# Patient Record
Sex: FEMALE | Race: WHITE | NOT HISPANIC OR LATINO | Employment: OTHER | URBAN - METROPOLITAN AREA
[De-identification: names, ages, dates, MRNs, and addresses within clinical notes are randomized per-mention and may not be internally consistent; named-entity substitution may affect disease eponyms.]

---

## 2019-04-17 ENCOUNTER — OFFICE VISIT (OUTPATIENT)
Dept: URGENT CARE | Facility: CLINIC | Age: 59
End: 2019-04-17
Payer: COMMERCIAL

## 2019-04-17 VITALS
TEMPERATURE: 98.5 F | HEIGHT: 64 IN | DIASTOLIC BLOOD PRESSURE: 71 MMHG | WEIGHT: 168 LBS | OXYGEN SATURATION: 96 % | RESPIRATION RATE: 16 BRPM | SYSTOLIC BLOOD PRESSURE: 128 MMHG | HEART RATE: 106 BPM | BODY MASS INDEX: 28.68 KG/M2

## 2019-04-17 DIAGNOSIS — T17.208A FOREIGN BODY IN PHARYNX, INITIAL ENCOUNTER: Primary | ICD-10-CM

## 2019-04-17 PROCEDURE — 99203 OFFICE O/P NEW LOW 30 MIN: CPT | Performed by: PHYSICIAN ASSISTANT

## 2019-04-17 RX ORDER — PAROXETINE HYDROCHLORIDE 40 MG/1
40 TABLET, FILM COATED ORAL EVERY MORNING
COMMUNITY

## 2019-04-17 RX ORDER — LISINOPRIL AND HYDROCHLOROTHIAZIDE 25; 20 MG/1; MG/1
1 TABLET ORAL DAILY
COMMUNITY

## 2019-04-17 RX ORDER — ALPRAZOLAM 1 MG/1
TABLET ORAL
COMMUNITY
End: 2020-09-30 | Stop reason: ALTCHOICE

## 2020-03-31 ENCOUNTER — HOSPITAL ENCOUNTER (INPATIENT)
Facility: HOSPITAL | Age: 60
LOS: 5 days | Discharge: HOME | DRG: 179 | End: 2020-04-06
Attending: INTERNAL MEDICINE | Admitting: FAMILY MEDICINE
Payer: COMMERCIAL

## 2020-03-31 ENCOUNTER — APPOINTMENT (EMERGENCY)
Dept: RADIOLOGY | Facility: HOSPITAL | Age: 60
DRG: 179 | End: 2020-03-31
Payer: COMMERCIAL

## 2020-03-31 DIAGNOSIS — J96.01 ACUTE RESPIRATORY FAILURE WITH HYPOXIA (HCC): ICD-10-CM

## 2020-03-31 DIAGNOSIS — J18.9 PNEUMONIA OF BOTH LOWER LOBES DUE TO INFECTIOUS ORGANISM: Primary | ICD-10-CM

## 2020-03-31 DIAGNOSIS — U07.1 COVID-19 VIRUS INFECTION: ICD-10-CM

## 2020-03-31 DIAGNOSIS — R06.02 SOB (SHORTNESS OF BREATH): ICD-10-CM

## 2020-03-31 PROBLEM — E78.49 OTHER HYPERLIPIDEMIA: Status: ACTIVE | Noted: 2020-03-31

## 2020-03-31 PROBLEM — I10 ESSENTIAL HYPERTENSION: Status: ACTIVE | Noted: 2020-03-31

## 2020-03-31 PROBLEM — E87.6 HYPOKALEMIA: Status: ACTIVE | Noted: 2020-03-31

## 2020-03-31 LAB
ALBUMIN SERPL BCP-MCNC: 3 G/DL (ref 3.5–5)
ALP SERPL-CCNC: 63 U/L (ref 46–116)
ALT SERPL W P-5'-P-CCNC: 46 U/L (ref 12–78)
ANION GAP SERPL CALCULATED.3IONS-SCNC: 10 MMOL/L (ref 4–13)
AST SERPL W P-5'-P-CCNC: 44 U/L (ref 5–45)
BASE EX.OXY STD BLDV CALC-SCNC: 70 % (ref 60–80)
BASE EXCESS BLDV CALC-SCNC: 3.7 MMOL/L
BASOPHILS # BLD AUTO: 0.01 THOUSANDS/ΜL (ref 0–0.1)
BASOPHILS NFR BLD AUTO: 0 % (ref 0–1)
BILIRUB SERPL-MCNC: 0.27 MG/DL (ref 0.2–1)
BUN SERPL-MCNC: 11 MG/DL (ref 5–25)
CALCIUM SERPL-MCNC: 8.6 MG/DL (ref 8.3–10.1)
CHLORIDE SERPL-SCNC: 96 MMOL/L (ref 100–108)
CO2 SERPL-SCNC: 29 MMOL/L (ref 21–32)
CREAT SERPL-MCNC: 0.79 MG/DL (ref 0.6–1.3)
EOSINOPHIL # BLD AUTO: 0 THOUSAND/ΜL (ref 0–0.61)
EOSINOPHIL NFR BLD AUTO: 0 % (ref 0–6)
ERYTHROCYTE [DISTWIDTH] IN BLOOD BY AUTOMATED COUNT: 11.9 % (ref 11.6–15.1)
FLUAV RNA NPH QL NAA+PROBE: NORMAL
FLUBV RNA NPH QL NAA+PROBE: NORMAL
GFR SERPL CREATININE-BSD FRML MDRD: 81 ML/MIN/1.73SQ M
GLUCOSE SERPL-MCNC: 123 MG/DL (ref 65–140)
HCO3 BLDV-SCNC: 28 MMOL/L (ref 24–30)
HCT VFR BLD AUTO: 38.1 % (ref 34.8–46.1)
HGB BLD-MCNC: 13 G/DL (ref 11.5–15.4)
IMM GRANULOCYTES # BLD AUTO: 0.02 THOUSAND/UL (ref 0–0.2)
IMM GRANULOCYTES NFR BLD AUTO: 1 % (ref 0–2)
LACTATE SERPL-SCNC: 1.1 MMOL/L (ref 0.5–2)
LIPASE SERPL-CCNC: 252 U/L (ref 73–393)
LYMPHOCYTES # BLD AUTO: 0.84 THOUSANDS/ΜL (ref 0.6–4.47)
LYMPHOCYTES NFR BLD AUTO: 26 % (ref 14–44)
MCH RBC QN AUTO: 32.3 PG (ref 26.8–34.3)
MCHC RBC AUTO-ENTMCNC: 34.1 G/DL (ref 31.4–37.4)
MCV RBC AUTO: 95 FL (ref 82–98)
MONOCYTES # BLD AUTO: 0.22 THOUSAND/ΜL (ref 0.17–1.22)
MONOCYTES NFR BLD AUTO: 7 % (ref 4–12)
NEUTROPHILS # BLD AUTO: 2.09 THOUSANDS/ΜL (ref 1.85–7.62)
NEUTS SEG NFR BLD AUTO: 66 % (ref 43–75)
NRBC BLD AUTO-RTO: 0 /100 WBCS
NT-PROBNP SERPL-MCNC: 39 PG/ML
O2 CT BLDV-SCNC: 13.3 ML/DL
PCO2 BLDV: 41.1 MM HG (ref 42–50)
PH BLDV: 7.45 [PH] (ref 7.3–7.4)
PLATELET # BLD AUTO: 206 THOUSANDS/UL (ref 149–390)
PMV BLD AUTO: 9.8 FL (ref 8.9–12.7)
PO2 BLDV: 35.2 MM HG (ref 35–45)
POTASSIUM SERPL-SCNC: 3.1 MMOL/L (ref 3.5–5.3)
PROCALCITONIN SERPL-MCNC: <0.05 NG/ML
PROT SERPL-MCNC: 7 G/DL (ref 6.4–8.2)
RBC # BLD AUTO: 4.03 MILLION/UL (ref 3.81–5.12)
RSV RNA NPH QL NAA+PROBE: NORMAL
SODIUM SERPL-SCNC: 135 MMOL/L (ref 136–145)
TROPONIN I SERPL-MCNC: <0.02 NG/ML
WBC # BLD AUTO: 3.18 THOUSAND/UL (ref 4.31–10.16)

## 2020-03-31 PROCEDURE — 99285 EMERGENCY DEPT VISIT HI MDM: CPT | Performed by: PHYSICIAN ASSISTANT

## 2020-03-31 PROCEDURE — 96361 HYDRATE IV INFUSION ADD-ON: CPT

## 2020-03-31 PROCEDURE — 36415 COLL VENOUS BLD VENIPUNCTURE: CPT | Performed by: PHYSICIAN ASSISTANT

## 2020-03-31 PROCEDURE — 94760 N-INVAS EAR/PLS OXIMETRY 1: CPT

## 2020-03-31 PROCEDURE — 83690 ASSAY OF LIPASE: CPT | Performed by: PHYSICIAN ASSISTANT

## 2020-03-31 PROCEDURE — 84484 ASSAY OF TROPONIN QUANT: CPT | Performed by: PHYSICIAN ASSISTANT

## 2020-03-31 PROCEDURE — 84145 PROCALCITONIN (PCT): CPT | Performed by: FAMILY MEDICINE

## 2020-03-31 PROCEDURE — 99285 EMERGENCY DEPT VISIT HI MDM: CPT

## 2020-03-31 PROCEDURE — 83605 ASSAY OF LACTIC ACID: CPT | Performed by: PHYSICIAN ASSISTANT

## 2020-03-31 PROCEDURE — 96374 THER/PROPH/DIAG INJ IV PUSH: CPT

## 2020-03-31 PROCEDURE — 85025 COMPLETE CBC W/AUTO DIFF WBC: CPT | Performed by: PHYSICIAN ASSISTANT

## 2020-03-31 PROCEDURE — 82805 BLOOD GASES W/O2 SATURATION: CPT | Performed by: PHYSICIAN ASSISTANT

## 2020-03-31 PROCEDURE — 93005 ELECTROCARDIOGRAM TRACING: CPT

## 2020-03-31 PROCEDURE — 80053 COMPREHEN METABOLIC PANEL: CPT | Performed by: PHYSICIAN ASSISTANT

## 2020-03-31 PROCEDURE — 82955 ASSAY OF G6PD ENZYME: CPT | Performed by: FAMILY MEDICINE

## 2020-03-31 PROCEDURE — 83880 ASSAY OF NATRIURETIC PEPTIDE: CPT | Performed by: PHYSICIAN ASSISTANT

## 2020-03-31 PROCEDURE — 71045 X-RAY EXAM CHEST 1 VIEW: CPT

## 2020-03-31 PROCEDURE — 87635 SARS-COV-2 COVID-19 AMP PRB: CPT | Performed by: PHYSICIAN ASSISTANT

## 2020-03-31 PROCEDURE — 99220 PR INITIAL OBSERVATION CARE/DAY 70 MINUTES: CPT | Performed by: FAMILY MEDICINE

## 2020-03-31 PROCEDURE — 87631 RESP VIRUS 3-5 TARGETS: CPT | Performed by: FAMILY MEDICINE

## 2020-03-31 RX ORDER — SENNOSIDES 8.6 MG
1 TABLET ORAL DAILY
Status: DISCONTINUED | OUTPATIENT
Start: 2020-04-01 | End: 2020-04-06 | Stop reason: HOSPADM

## 2020-03-31 RX ORDER — HYDROXYCHLOROQUINE SULFATE 200 MG/1
200 TABLET, FILM COATED ORAL 2 TIMES DAILY
Status: COMPLETED | OUTPATIENT
Start: 2020-04-01 | End: 2020-04-05

## 2020-03-31 RX ORDER — PAROXETINE HYDROCHLORIDE 20 MG/1
40 TABLET, FILM COATED ORAL EVERY MORNING
Status: DISCONTINUED | OUTPATIENT
Start: 2020-04-01 | End: 2020-04-06 | Stop reason: HOSPADM

## 2020-03-31 RX ORDER — MAGNESIUM HYDROXIDE/ALUMINUM HYDROXICE/SIMETHICONE 120; 1200; 1200 MG/30ML; MG/30ML; MG/30ML
30 SUSPENSION ORAL EVERY 6 HOURS PRN
Status: DISCONTINUED | OUTPATIENT
Start: 2020-03-31 | End: 2020-04-06 | Stop reason: HOSPADM

## 2020-03-31 RX ORDER — ALBUTEROL SULFATE 90 UG/1
2 AEROSOL, METERED RESPIRATORY (INHALATION) EVERY 4 HOURS PRN
Status: DISCONTINUED | OUTPATIENT
Start: 2020-03-31 | End: 2020-04-06 | Stop reason: HOSPADM

## 2020-03-31 RX ORDER — POTASSIUM CHLORIDE 20 MEQ/1
40 TABLET, EXTENDED RELEASE ORAL ONCE
Status: COMPLETED | OUTPATIENT
Start: 2020-03-31 | End: 2020-03-31

## 2020-03-31 RX ORDER — DOCUSATE SODIUM 100 MG/1
100 CAPSULE, LIQUID FILLED ORAL 2 TIMES DAILY
Status: DISCONTINUED | OUTPATIENT
Start: 2020-03-31 | End: 2020-04-06 | Stop reason: HOSPADM

## 2020-03-31 RX ORDER — ALBUTEROL SULFATE 90 UG/1
2 AEROSOL, METERED RESPIRATORY (INHALATION) ONCE
Status: COMPLETED | OUTPATIENT
Start: 2020-03-31 | End: 2020-03-31

## 2020-03-31 RX ORDER — CALCIUM CARBONATE 200(500)MG
1000 TABLET,CHEWABLE ORAL DAILY PRN
Status: DISCONTINUED | OUTPATIENT
Start: 2020-03-31 | End: 2020-04-06 | Stop reason: HOSPADM

## 2020-03-31 RX ORDER — HYDROXYCHLOROQUINE SULFATE 200 MG/1
400 TABLET, FILM COATED ORAL 2 TIMES DAILY
Status: COMPLETED | OUTPATIENT
Start: 2020-03-31 | End: 2020-04-01

## 2020-03-31 RX ORDER — PRAVASTATIN SODIUM 20 MG
20 TABLET ORAL DAILY
Status: DISCONTINUED | OUTPATIENT
Start: 2020-04-01 | End: 2020-04-03

## 2020-03-31 RX ORDER — AZITHROMYCIN 250 MG/1
500 TABLET, FILM COATED ORAL EVERY 24 HOURS
Status: DISCONTINUED | OUTPATIENT
Start: 2020-04-01 | End: 2020-03-31

## 2020-03-31 RX ORDER — PRAVASTATIN SODIUM 20 MG
20 TABLET ORAL DAILY
COMMUNITY
Start: 2019-11-15

## 2020-03-31 RX ORDER — HYDROCHLOROTHIAZIDE 25 MG/1
25 TABLET ORAL DAILY
Status: DISCONTINUED | OUTPATIENT
Start: 2020-04-01 | End: 2020-04-06 | Stop reason: HOSPADM

## 2020-03-31 RX ADMIN — POTASSIUM CHLORIDE 40 MEQ: 1500 TABLET, EXTENDED RELEASE ORAL at 15:54

## 2020-03-31 RX ADMIN — HYDROXYCHLOROQUINE SULFATE 400 MG: 200 TABLET, FILM COATED ORAL at 17:45

## 2020-03-31 RX ADMIN — DOCUSATE SODIUM 100 MG: 100 CAPSULE, LIQUID FILLED ORAL at 17:44

## 2020-03-31 RX ADMIN — ALBUTEROL SULFATE 2 PUFF: 90 AEROSOL, METERED RESPIRATORY (INHALATION) at 13:49

## 2020-03-31 RX ADMIN — AZITHROMYCIN MONOHYDRATE 500 MG: 500 INJECTION, POWDER, LYOPHILIZED, FOR SOLUTION INTRAVENOUS at 14:55

## 2020-03-31 RX ADMIN — SODIUM CHLORIDE 1000 ML: 0.9 INJECTION, SOLUTION INTRAVENOUS at 13:42

## 2020-03-31 RX ADMIN — Medication 1 TABLET: at 17:44

## 2020-03-31 NOTE — PLAN OF CARE
Problem: PAIN - ADULT  Goal: Verbalizes/displays adequate comfort level or baseline comfort level  Description  Interventions:  - Encourage patient to monitor pain and request assistance  - Assess pain using appropriate pain scale  - Administer analgesics based on type and severity of pain and evaluate response  - Implement non-pharmacological measures as appropriate and evaluate response  - Consider cultural and social influences on pain and pain management  - Notify physician/advanced practitioner if interventions unsuccessful or patient reports new pain  Outcome: Progressing     Problem: INFECTION - ADULT  Goal: Absence or prevention of progression during hospitalization  Description  INTERVENTIONS:  - Assess and monitor for signs and symptoms of infection  - Monitor lab/diagnostic results  - Monitor all insertion sites, i e  indwelling lines, tubes, and drains  - Monitor endotracheal if appropriate and nasal secretions for changes in amount and color  - [place] appropriate cooling/warming therapies per order  - Administer medications as ordered  - Instruct and encourage patient and family to use good hand hygiene technique  - Identify and instruct in appropriate isolation precautions for identified infection/condition  Outcome: Progressing  Goal: Absence of fever/infection during neutropenic period  Description  INTERVENTIONS:  - Monitor WBC    Outcome: Progressing     Problem: SAFETY ADULT  Goal: Patient will remain free of falls  Description  INTERVENTIONS:  - Assess patient frequently for physical needs  -  Identify cognitive and physical deficits and behaviors that affect risk of falls    -  [place] fall precautions as indicated by assessment   - Educate patient/family on patient safety including physical limitations  - Instruct patient to call for assistance with activity based on assessment  - Modify environment to reduce risk of injury  - Consider OT/PT consult to assist with strengthening/mobility  Outcome: Progressing  Goal: Maintain or return to baseline ADL function  Description  INTERVENTIONS:  -  Assess patient's ability to carry out ADLs; assess patient's baseline for ADL function and identify physical deficits which impact ability to perform ADLs (bathing, care of mouth/teeth, toileting, grooming, dressing, etc )  - Assess/evaluate cause of self-care deficits   - Assess range of motion  - Assess patient's mobility; develop plan if impaired  - Assess patient's need for assistive devices and provide as appropriate  - Encourage maximum independence but intervene and supervise when necessary  - Involve family in performance of ADLs  - Assess for home care needs following discharge   - Consider OT consult to assist with ADL evaluation and planning for discharge  - Provide patient education as appropriate  Outcome: Progressing  Goal: Maintain or return mobility status to optimal level  Description  INTERVENTIONS:  - Assess patient's baseline mobility status (ambulation, transfers, stairs, etc )    - Identify cognitive and physical deficits and behaviors that affect mobility  - Identify mobility aids required to assist with transfers and/or ambulation (gait belt, sit-to-stand, lift, walker, cane, etc )  - [place] fall precautions as indicated by assessment  - Record patient progress and toleration of activity level on Mobility SBAR; progress patient to next Phase/Stage  - Instruct patient to call for assistance with activity based on assessment  - Consider rehabilitation consult to assist with strengthening/weightbearing, etc   Outcome: Progressing     Problem: DISCHARGE PLANNING  Goal: Discharge to home or other facility with appropriate resources  Description  INTERVENTIONS:  - Identify barriers to discharge w/patient and caregiver  - Arrange for needed discharge resources and transportation as appropriate  - Identify discharge learning needs (meds, wound care, etc )  - Arrange for interpretive services to assist at discharge as needed  - Refer to Case Management Department for coordinating discharge planning if the patient needs post-hospital services based on physician/advanced practitioner order or complex needs related to functional status, cognitive ability, or social support system  Outcome: Progressing     Problem: Knowledge Deficit  Goal: Patient/family/caregiver demonstrates understanding of disease process, treatment plan, medications, and discharge instructions  Description  Complete learning assessment and assess knowledge base    Interventions:  - Provide teaching at level of understanding  - Provide teaching via preferred learning methods  Outcome: Progressing

## 2020-03-31 NOTE — ED PROVIDER NOTES
History  Chief Complaint   Patient presents with    Flu Symptoms     Pt states for the past 10 days she has had a cough, SOB, on and off fevers, and nausea/diarrhea  Denies any vomiting     The patient a 71-year-old female with a past medical history hypertension, dyslipidemia presents emergency department today with chief cough fevers and shortness of breath  Patient states she has had a dry persistent with intermittent fevers over the last 10 days  Patient states that this has gradually worsened  Patient states that over the last 2 days she is becoming short of breath  Patient notices her shortness of breath with minimal exertion and speaking  Patient denies any sputum production  Patient denies any nausea, vomiting, diarrhea, abdominal pain, chest pain  Patient denies smoking history, asthma or COPD  Patient denies any recent travel  Patient states that her son is also sick  Patient took her temperature last evening orally and it was 102  Patient has not taken anything prior to arrival           Flu Symptoms   Presenting symptoms: cough, fatigue, fever and shortness of breath    Presenting symptoms: no sore throat and no vomiting    Cough:     Cough characteristics:  Non-productive    Sputum characteristics:  Unable to specify    Severity:  Moderate    Onset quality:  Gradual    Timing:  Constant    Progression:  Worsening    Chronicity:  New  Fatigue:     Severity:  Moderate    Duration:  10 days    Timing:  Constant    Progression:  Worsening  Fever:     Duration:  10 days    Timing:  Intermittent    Max temp PTA:  102    Temp source:  Oral    Progression:  Waxing and waning  Severity:  Moderate  Onset quality:  Gradual  Progression:  Worsening  Chronicity:  New  Relieved by:  Rest  Worsened by:   Movement  Ineffective treatments:  OTC medications and decongestant  Associated symptoms: chills and decreased appetite    Associated symptoms: no decrease in physical activity, no ear pain, no mental status change, no congestion, no neck stiffness and no syncope    Risk factors: being elderly and sick contacts    Risk factors: no diabetes problem, no heart disease, no immunocompromised state, no kidney disease, no liver disease and not pregnant        Prior to Admission Medications   Prescriptions Last Dose Informant Patient Reported? Taking? ALPRAZolam (XANAX) 1 mg tablet Not Taking at Unknown time  Yes No   Sig: Take by mouth daily at bedtime as needed for anxiety   PARoxetine (PAXIL) 40 MG tablet 3/31/2020 at Unknown time  Yes Yes   Sig: Take 40 mg by mouth every morning   Red Yeast Rice Extract (RED YEAST RICE PO) Not Taking at Unknown time  Yes No   Sig: Take by mouth   calcium carbonate-vitamin D (OSCAL-D) 500 mg-200 units per tablet Past Month at Unknown time  Yes Yes   Sig: Take 1 tablet by mouth 2 (two) times a day with meals   lisinopril-hydrochlorothiazide (PRINZIDE,ZESTORETIC) 20-25 MG per tablet 3/31/2020 at Unknown time  Yes Yes   Sig: Take 1 tablet by mouth daily   pravastatin (PRAVACHOL) 20 mg tablet   Yes Yes   Sig: Take 20 mg by mouth daily      Facility-Administered Medications: None       Past Medical History:   Diagnosis Date    Depression     High cholesterol     Hypertension     Insomnia     Spinal stenosis        Past Surgical History:   Procedure Laterality Date     SECTION      ENDOMETRIAL ABLATION      TUBAL LIGATION         History reviewed  No pertinent family history  I have reviewed and agree with the history as documented  E-Cigarette/Vaping     E-Cigarette/Vaping Substances     Social History     Tobacco Use    Smoking status: Never Smoker    Smokeless tobacco: Never Used   Substance Use Topics    Alcohol use: Yes     Frequency: Monthly or less    Drug use: Not Currently       Review of Systems   Constitutional: Positive for chills, decreased appetite, fatigue and fever  HENT: Negative for congestion, ear pain and sore throat      Eyes: Negative for pain and visual disturbance  Respiratory: Positive for cough and shortness of breath  Negative for wheezing  Cardiovascular: Negative for chest pain, palpitations and leg swelling  Gastrointestinal: Negative for abdominal pain and vomiting  Genitourinary: Negative for dysuria and hematuria  Musculoskeletal: Negative for arthralgias, back pain and neck stiffness  Skin: Negative for color change and rash  Neurological: Negative for dizziness, seizures and syncope  Physical Exam  Physical Exam   Constitutional: She is oriented to person, place, and time  She appears well-developed and well-nourished  No distress  HENT:   Head: Normocephalic and atraumatic  Right Ear: External ear normal    Left Ear: External ear normal    Mouth/Throat: Oropharynx is clear and moist    Eyes: Pupils are equal, round, and reactive to light  EOM are normal    Neck: Normal range of motion  Neck supple  Cardiovascular: Normal rate, regular rhythm and intact distal pulses  No murmur heard  Pulmonary/Chest: Effort normal  Tachypnea noted  No respiratory distress  She has decreased breath sounds  She has no wheezes  Abdominal: Soft  Bowel sounds are normal  She exhibits no mass  There is no tenderness  There is no rebound  No hernia  Musculoskeletal: Normal range of motion  She exhibits no edema or tenderness  Neurological: She is alert and oriented to person, place, and time  Coordination normal    Skin: Skin is warm and dry  Capillary refill takes less than 2 seconds  Psychiatric: She has a normal mood and affect  Her behavior is normal    Nursing note and vitals reviewed        Vital Signs  ED Triage Vitals [03/31/20 1250]   Temperature Pulse Respirations Blood Pressure SpO2   98 3 °F (36 8 °C) 88 18 98/64 93 %      Temp Source Heart Rate Source Patient Position - Orthostatic VS BP Location FiO2 (%)   Temporal Monitor Sitting Right arm --      Pain Score       No Pain           Vitals:    03/31/20 1250   BP: 98/64 Pulse: 88   Patient Position - Orthostatic VS: Sitting         Visual Acuity      ED Medications  Medications   azithromycin (ZITHROMAX) 500 mg in sodium chloride 0 9 % 250 mL IVPB (500 mg Intravenous New Bag 3/31/20 1455)   potassium chloride (K-DUR,KLOR-CON) CR tablet 40 mEq (has no administration in time range)   sodium chloride 0 9 % bolus 1,000 mL (0 mL Intravenous Stopped 3/31/20 1442)   albuterol (PROVENTIL HFA,VENTOLIN HFA) inhaler 2 puff (2 puffs Inhalation Given 3/31/20 1349)       Diagnostic Studies  Results Reviewed     Procedure Component Value Units Date/Time    Comprehensive metabolic panel [[id number]]  (Abnormal) Collected:  03/31/20 1341    Lab Status:  Final result Specimen:  Blood from Line, Venous Updated:  03/31/20 1425     Sodium 135 mmol/L      Potassium 3 1 mmol/L      Chloride 96 mmol/L      CO2 29 mmol/L      ANION GAP 10 mmol/L      BUN 11 mg/dL      Creatinine 0 79 mg/dL      Glucose 123 mg/dL      Calcium 8 6 mg/dL      AST 44 U/L      ALT 46 U/L      Alkaline Phosphatase 63 U/L      Total Protein 7 0 g/dL      Albumin 3 0 g/dL      Total Bilirubin 0 27 mg/dL      eGFR 81 ml/min/1 73sq m     Narrative:       [place] guidelines for Chronic Kidney Disease (CKD):     Stage 1 with normal or high GFR (GFR > 90 mL/min/1 73 square meters)    Stage 2 Mild CKD (GFR = 60-89 mL/min/1 73 square meters)    Stage 3A Moderate CKD (GFR = 45-59 mL/min/1 73 square meters)    Stage 3B Moderate CKD (GFR = 30-44 mL/min/1 73 square meters)    Stage 4 Severe CKD (GFR = 15-29 mL/min/1 73 square meters)    Stage 5 End Stage CKD (GFR <15 mL/min/1 73 square meters)  Note: GFR calculation is accurate only with a steady state creatinine    Lipase [[id number]]  (Normal) Collected:  03/31/20 1341    Lab Status:  Final result Specimen:  Blood from Line, Venous Updated:  03/31/20 1425     Lipase 252 u/L     NT-BNP PRO [[id number]]  (Normal) Collected:  03/31/20 1341    Lab Status:  Final result Specimen:  Blood from Line, Venous Updated:  03/31/20 1425     NT-proBNP 39 pg/mL     Lactic acid, plasma x2 [[id number]]  (Normal) Collected:  03/31/20 1341    Lab Status:  Final result Specimen:  Blood from Line, Venous Updated:  03/31/20 1422     LACTIC ACID 1 1 mmol/L     Narrative:       Result may be elevated if tourniquet was used during collection  Troponin I [[id number]]  (Normal) Collected:  03/31/20 1341    Lab Status:  Final result Specimen:  Blood from Line, Venous Updated:  03/31/20 1422     Troponin I <0 02 ng/mL     Coronavirus 2019-nCoV [facility] [[id number]] Collected:  03/31/20 1411    Lab Status:   In process Specimen:  Nasopharyngeal Swab Updated:  03/31/20 1414    CBC and differential [[id number]]  (Abnormal) Collected:  03/31/20 1341    Lab Status:  Final result Specimen:  Blood from Line, Venous Updated:  03/31/20 1404     WBC 3 18 Thousand/uL      RBC 4 03 Million/uL      Hemoglobin 13 0 g/dL      Hematocrit 38 1 %      MCV 95 fL      MCH 32 3 pg      MCHC 34 1 g/dL      RDW 11 9 %      MPV 9 8 fL      Platelets 030 Thousands/uL      nRBC 0 /100 WBCs      Neutrophils Relative 66 %      Immat GRANS % 1 %      Lymphocytes Relative 26 %      Monocytes Relative 7 %      Eosinophils Relative 0 %      Basophils Relative 0 %      Neutrophils Absolute 2 09 Thousands/µL      Immature Grans Absolute 0 02 Thousand/uL      Lymphocytes Absolute 0 84 Thousands/µL      Monocytes Absolute 0 22 Thousand/µL      Eosinophils Absolute 0 00 Thousand/µL      Basophils Absolute 0 01 Thousands/µL     Blood gas, venous [[id number]]  (Abnormal) Collected:  03/31/20 1341    Lab Status:  Final result Specimen:  Blood from Line, Venous Updated:  03/31/20 1404     pH, Clint 7 451     pCO2, Clint 41 1 mm Hg      pO2, Clint 35 2 mm Hg      HCO3, Clint 28 0 mmol/L      Base Excess, Clint 3 7 mmol/L      O2 Content, Clint 13 3 ml/dL      O2 HGB, VENOUS 70 0 %     Lactic acid, plasma x2 [[id number]]     Lab Status:  No result Specimen:  Blood     UA w Reflex to Microscopic w Reflex to Culture [[id number]]     Lab Status:  No result Specimen:  Urine                  XR chest 1 view portable   Final Result by Candelaria Abraham MD (03/31 1431)   Probable faint peripheral infiltrates at both lung bases      Low lung volumes         Workstation performed: [id number]                    Procedures  ECG 12 Lead Documentation Only  Date/Time: 3/31/2020 2:14 PM  Performed by: America Pritchett PA-C  Authorized by: America Pritchett PA-C     Indications / Diagnosis:  Sob  ECG reviewed by me, the ED Provider: yes    Patient location:  ED  Previous ECG:     Previous ECG:  Unavailable  Interpretation:     Interpretation: normal    Rate:     ECG rate:  91    ECG rate assessment: normal    Rhythm:     Rhythm: sinus rhythm    Ectopy:     Ectopy: none    QRS:     QRS axis:  Normal  Conduction:     Conduction: normal    ST segments:     ST segments:  Normal  T waves:     T waves: normal               ED Course  ED Course as of Mar 31 1511   Tue Mar 31, 2020   1458 Potassium(!): 3 1   1458 WBC(!): 3 18   1506 Patient ambulated with SpO2 monitoring and dropped down to 89% and was placed on 2 liters NC                                     MDM  Number of Diagnoses or Management Options  Pneumonia of both lower lobes due to infectious organism [facility]):   SOB (shortness of breath):   Diagnosis management comments: Differential diagnosis included but was not limited to pneumonia, upper respiratory infection, ACS pleural effusion, dehydration  The patient is a 76-year-old female who presents emergency department today with a complaint of fever and cough times 10 days that is been worsening and shortness of breath over the last 2 days  Patient upon initial physical exam was tachypneic however SpO2 was 93%  Patient's lung sounds were very diminished on physical examination  Patient was given albuterol inhaler while in the emergency department    Patient had improvement with the inhaler  Patient was found on chest x-ray to have trace bilateral infiltrates consistent with pneumonia  Patient was ambulated in the emergency room and found to have SpO2 dropped to 89% on room air therefore she was placed on 2 L of nasal cannula oxygen  Patient was given IV fluids and IV azithromycin  Dr Adenike Obrien accepted patient admission to observation and patient was in agreement with treatment plan  COVID test pending       Amount and/or Complexity of Data Reviewed  Clinical lab tests: ordered and reviewed  Tests in the radiology section of CPT®: ordered and reviewed  Decide to obtain previous medical records or to obtain history from someone other than the patient: yes  Review and summarize past medical records: yes  Discuss the patient with other providers: yes  Independent visualization of images, tracings, or specimens: yes          Disposition  Final diagnoses:   Pneumonia of both lower lobes due to infectious organism ([address] )   SOB (shortness of breath)     Time reflects when diagnosis was documented in both MDM as applicable and the Disposition within this note     Time User Action Codes Description Comment    3/31/2020  3:07 PM Belinda Almanzar [J18 [id number]] Pneumonia of both lower lobes due to infectious organism ([facility] [address] )     3/31/2020  3:07 PM Belinda Almanzar [R06 02] SOB (shortness of breath)       ED Disposition     ED Disposition Condition Date/Time Comment    Admit Stable Tue Mar 31, 2020  3:07 PM Case was discussed with avel and the patient's admission status was agreed to be Admission Status: observation status to the service of Dr Adenike Obrien    Follow-up Information     Follow up With Specialties Details Why Contact Info    Alejandro Arndt MD [facility]  [phone]            Patient's Medications   Discharge Prescriptions    No medications on file     No discharge procedures on file      PDMP Review     None          ED Provider  Electronically Signed by           Emmanuel Thurman PA-C  03/31/20 7510

## 2020-03-31 NOTE — ASSESSMENT & PLAN NOTE
Unknown etiology  Sudden onset  No history of asthma or COPD  Patient denies any recent traveling history within the area or out side of [facility]  Had history of sick contact (patient's son has URI symptoms)  Upon arrival, in the ER patient was saturating 89% on ambulation-condition improves with supplemental oxygen  Patient also tachypneic upon arrival  VBG reviewed  Follow RSV, flu, urine antigen for pneumonia, Legionella, follow procalcitonin, COVID, G6PD  Patient received 1 dose of azithromycin in the ER   we will not start Hydroxychloroquine as per COVID protocol (as patient does qualify-age>60, desaturates to 89% on RA,Hypertension)- IF COVID PCR comes back negative-we will D/C Plaquinil  At this time will avoid any steroid, we will DC lisinopril-as patient under investigation for COVID  CXR: Probable faint peripheral infiltrates at both lung bases  Low lung volumes  Continue respiratory protocol with oxygen supplementation  Monitor EKG everyday for any QT prolongation

## 2020-03-31 NOTE — ASSESSMENT & PLAN NOTE
Controlled  Patient takes lisinopril-hydrochlorothiazide at home  Since patient under investigation for COVID- we will hold Lisinopril now  Continue hydrochlorothiazide

## 2020-03-31 NOTE — H&P
H&P- Dwaine Arevalo 1960, 63 y o  female MRN: [id number]    Unit/Bed#: -01 Encounter: [id number]    Primary Care Provider: Herb Nageotte, MD   Date and time admitted to hospital: 3/31/2020 12:46 PM        * Acute respiratory failure with hypoxia [facility])  Assessment & Plan  Unknown etiology  Sudden onset  No history of asthma or COPD  Patient denies any recent traveling history within the area or out side of [facility]  Had history of sick contact (patient's son has URI symptoms)  Upon arrival, in the ER patient was saturating 89% on ambulation-condition improves with supplemental oxygen  Patient also tachypneic upon arrival  VBG reviewed  Follow RSV, flu, urine antigen for pneumonia, Legionella, follow procalcitonin, COVID, G6PD  Patient received 1 dose of azithromycin in the ER   we will not start Hydroxychloroquine as per COVID protocol (as patient does qualify-age>60, desaturates to 89% on RA,Hypertension)- IF COVID PCR comes back negative-we will D/C Plaquinil  At this time will avoid any steroid, we will DC lisinopril-as patient under investigation for COVID  CXR: Probable faint peripheral infiltrates at both lung bases  Low lung volumes  Continue respiratory protocol with oxygen supplementation  Monitor EKG everyday for any QT prolongation    Essential hypertension  Assessment & Plan  Controlled  Patient takes lisinopril-hydrochlorothiazide at home  Since patient under investigation for COVID- we will hold Lisinopril now  Continue hydrochlorothiazide    Hypokalemia  Assessment & Plan  Unknown etiology  Potassium was 3 1  Patient received p o   Potassium supplement-recheck BMP    Other hyperlipidemia  Assessment & Plan  Continue pravastatin    VTE Prophylaxis: Enoxaparin (Lovenox)  / sequential compression device   Code Status:  Full code  Discussion with family:  Ebony Segura is on bedside    Anticipated Length of Stay:  Patient will be admitted on an Observation basis with an anticipated length of stay of  < 2 midnights  Justification for Hospital Stay:  Acute respiratory failure, hypokalemia, hypertension    Total Time for Visit, including Counseling / Coordination of Care: 45 minutes  Greater than 50% of this total time spent on direct patient counseling and coordination of care  Chief Complaint:   Shortness of breath, fatigue and tiredness    History of Present Illness:    Michael Lopes is a 63 y o  female who presents with shortness of breath, fatigue and tiredness along with cough and fever  Patient reports she was quite well about 10 days ago  Then she started having mainly dry cough, fatigue and tiredness and having on and off fever, highest temperature was 102°  Also having chills  Denies any recent traveling to nearby [address] or outside of [facility], does not smoke, denies any history of COPD or asthma  Denies any diarrhea, sore throat, headache, ear pain, chest congestion, dizziness, lightheadedness  But feels body ache and tired  Patient also reports her shortness of breath is progressing  Patient reports after taking few steps she feels tired and short of breath  Denies any previous history of cardiac conditions  Denies any diarrhea, abdominal pain, distention, any problem with urination  Denies any rash, joint pain, neck stiffness  Patient also reports her son was having the upper respiratory tract of symptoms and he was working in [employer] which is closed due to recent [address]  Review of Systems:    Review of Systems   Constitutional: Positive for activity change, appetite change, chills, diaphoresis, fatigue and fever  Negative for unexpected weight change  HENT: Positive for rhinorrhea  Negative for congestion, dental problem, drooling, ear pain, facial swelling, postnasal drip, sinus pressure, sinus pain, sneezing, sore throat, tinnitus, trouble swallowing and voice change  Eyes: Negative for photophobia, pain, discharge, redness, itching and visual disturbance  Respiratory: Positive for cough and shortness of breath  Negative for apnea, chest tightness, wheezing and stridor  Gastrointestinal: Negative for abdominal distention, abdominal pain, anal bleeding, blood in stool, constipation, diarrhea, nausea, rectal pain and vomiting  Genitourinary: Negative for difficulty urinating, dyspareunia and dysuria  Musculoskeletal: Positive for myalgias  Negative for arthralgias, gait problem, joint swelling and neck stiffness  Skin: Negative for color change, pallor, rash and wound  Neurological: Negative for dizziness, tremors, facial asymmetry, weakness, light-headedness and headaches  Hematological: Negative for adenopathy  Psychiatric/Behavioral: Negative for agitation, behavioral problems, confusion and decreased concentration  All other systems reviewed and are negative  Past Medical and Surgical History:     Past Medical History:   Diagnosis Date    Depression     High cholesterol     Hypertension     Insomnia     Spinal stenosis        Past Surgical History:   Procedure Laterality Date     SECTION      ENDOMETRIAL ABLATION      TUBAL LIGATION         Meds/Allergies:    Prior to Admission medications    Medication Sig Start Date End Date Taking?  Authorizing Provider   calcium carbonate-vitamin D (OSCAL-D) 500 mg-200 units per tablet Take 1 tablet by mouth 2 (two) times a day with meals   Yes Historical Provider, MD   lisinopril-hydrochlorothiazide (PRINZIDE,ZESTORETIC) 20-25 MG per tablet Take 1 tablet by mouth daily   Yes Historical Provider, MD   PARoxetine (PAXIL) 40 MG tablet Take 40 mg by mouth every morning   Yes Historical Provider, MD   pravastatin (PRAVACHOL) 20 mg tablet Take 20 mg by mouth daily 11/15/19  Yes Historical Provider, MD   ALPRAZolam Miriam Dorado) 1 mg tablet Take by mouth daily at bedtime as needed for anxiety    Historical Provider, MD   Red Yeast Rice Extract (RED YEAST RICE PO) Take by mouth    Historical Provider, MD FISCHER have reviewed home medications with patient personally  Allergies: No Known Allergies    Social History:     Marital Status: Single   Occupation:  Unknown  Patient Pre-hospital Living Situation:  At home  Patient Pre-hospital Level of Mobility:  Independent  Patient Pre-hospital Diet Restrictions:  No restriction  Substance Use History:   Social History     Substance and Sexual Activity   Alcohol Use Yes    Frequency: Monthly or less     Social History     Tobacco Use   Smoking Status Never Smoker   Smokeless Tobacco Never Used     Social History     Substance and Sexual Activity   Drug Use Not Currently       Family History:    non-contributory    Physical Exam:     Vitals:   Blood Pressure: 107/55 (03/31/20 1600)  Pulse: 72 (03/31/20 1600)  Temperature: 98 3 °F (36 8 °C) (03/31/20 1250)  Temp Source: Temporal (03/31/20 1250)  Respirations: (!) 34 (03/31/20 1500)  Height: 5' 4" (162 6 cm) (03/31/20 1250)  Weight - Scale: 77 1 kg (169 lb 15 6 oz) (03/31/20 1250)  SpO2: 98 % (03/31/20 1600)    Physical Exam   Constitutional: She is oriented to person, place, and time  She appears well-developed  She appears ill  No distress  HENT:   Mouth/Throat: Oropharynx is clear and moist  No oropharyngeal exudate  Eyes: Pupils are equal, round, and reactive to light  Conjunctivae and EOM are normal    Neck: Normal range of motion  Neck supple  No JVD present  Cardiovascular: Normal rate  Exam reveals no gallop and no friction rub  No murmur heard  Pulmonary/Chest: Effort normal  She has rales (Bibasilar)  She exhibits no tenderness  Abdominal: Soft  Bowel sounds are normal  She exhibits no distension and no mass  There is no tenderness  There is no guarding  Musculoskeletal: Normal range of motion  She exhibits no edema  Lymphadenopathy:     She has no cervical adenopathy  Neurological: She is alert and oriented to person, place, and time  She displays normal reflexes   No cranial nerve deficit or sensory deficit  She exhibits normal muscle tone  Coordination normal    Skin: Skin is warm  Capillary refill takes less than 2 seconds  No rash noted  She is not diaphoretic  No erythema  Psychiatric: She has a normal mood and affect  Her behavior is normal    Nursing note and vitals reviewed  Additional Data:     Lab Results: I have personally reviewed pertinent reports  Results from last 7 days   Lab Units 03/31/20  1341   WBC Thousand/uL 3 18*   HEMOGLOBIN g/dL 13 0   HEMATOCRIT % 38 1   PLATELETS Thousands/uL 206   NEUTROS PCT % 66   LYMPHS PCT % 26   MONOS PCT % 7   EOS PCT % 0     Results from last 7 days   Lab Units 03/31/20  1341   SODIUM mmol/L 135*   POTASSIUM mmol/L 3 1*   CHLORIDE mmol/L 96*   CO2 mmol/L 29   BUN mg/dL 11   CREATININE mg/dL 0 79   ANION GAP mmol/L 10   CALCIUM mg/dL 8 6   ALBUMIN g/dL 3 0*   TOTAL BILIRUBIN mg/dL 0 27   ALK PHOS U/L 63   ALT U/L 46   AST U/L 44   GLUCOSE RANDOM mg/dL 123                 Results from last 7 days   Lab Units 03/31/20  1341   LACTIC ACID mmol/L 1 1       Imaging: I have personally reviewed pertinent reports  XR chest 1 view portable   Final Result by Kareem Anderson MD (03/31 1431)   Probable faint peripheral infiltrates at both lung bases      Low lung volumes         Workstation performed: [id number]             EKG, Pathology, and Other Studies Reviewed on Admission:   · EKG:  EKG reviewed  No axis deviation, no ST-T abnormalities, no prolonged QT interval     Allscripts / Epic Records Reviewed: Yes     ** Please Note: This note has been constructed using a voice recognition system   **

## 2020-03-31 NOTE — ED NOTES
Call received from ICU, report given to [facility], pt prepped for transport       Silva Nascimento, RN  03/31/20 0043

## 2020-03-31 NOTE — RESPIRATORY THERAPY NOTE
RT Protocol Note  Ingrid Higgins 63 y o  female MRN: [id number]  Unit/Bed#: -01 Encounter: [id number]    Assessment    Principal Problem:    Acute respiratory failure with hypoxia ([address] )  Active Problems:    Hypokalemia    Essential hypertension    Other hyperlipidemia      Home Pulmonary Medications:  None       Past Medical History:   Diagnosis Date    Depression     High cholesterol     Hypertension     Insomnia     Spinal stenosis      Social History     Socioeconomic History    Marital status: Single     Spouse name: None    Number of children: None    Years of education: None    Highest education level: None   Occupational History    None   Social Needs    Financial resource strain: None    Food insecurity:     Worry: None     Inability: None    Transportation needs:     Medical: None     Non-medical: None   Tobacco Use    Smoking status: Never Smoker    Smokeless tobacco: Never Used   Substance and Sexual Activity    Alcohol use: Yes     Frequency: Monthly or less    Drug use: Not Currently    Sexual activity: None   Lifestyle    Physical activity:     Days per week: None     Minutes per session: None    Stress: None   Relationships    Social connections:     Talks on phone: None     Gets together: None     Attends Spiritism service: None     Active member of club or organization: None     Attends meetings of clubs or organizations: None     Relationship status: None    Intimate partner violence:     Fear of current or ex partner: None     Emotionally abused: None     Physically abused: None     Forced sexual activity: None   Other Topics Concern    None   Social History Narrative    None       Subjective         Objective    Physical Exam:   Assessment Type: Assess only  General Appearance: Alert, Awake  Respiratory Pattern: Normal  O2 Device: 2LPM NC    Vitals:  Blood pressure 107/55, pulse 74, temperature 98 3 °F (36 8 °C), temperature source Temporal, resp   rate 22, height 5' 4" (1 626 m), weight 77 1 kg (169 lb 15 6 oz), SpO2 98 %  Imaging and other studies: I have personally reviewed pertinent reports  O2 Device: 2LPM NC     Plan    Respiratory Plan: Mild Distress pathway        Resp Comments: Assessed per chart and visualization of pt   Pt is resting comfortably on NC, no wheezing, will continue MDI PRN

## 2020-03-31 NOTE — ED NOTES
[facility] at bedside, will transport after [facility] is finished evaluating pt     Sandra Mayer, RN  03/31/20 8028

## 2020-03-31 NOTE — ED NOTES
ICU requesting additional time prior to transporting pt due to just receiving critical pt from ED, will call when ready for report       Toyin Hernandez RN  03/31/20 1000

## 2020-03-31 NOTE — ED NOTES
Pt amb in room, SPO2 93% while ambulating, after return to bed SPO2 89%, O2 applied @2L via NC       Mi Spears RN  03/31/20 1217

## 2020-04-01 LAB
ANION GAP SERPL CALCULATED.3IONS-SCNC: 9 MMOL/L (ref 4–13)
BACTERIA UR QL AUTO: ABNORMAL /HPF
BASOPHILS # BLD MANUAL: 0 THOUSAND/UL (ref 0–0.1)
BASOPHILS NFR MAR MANUAL: 0 % (ref 0–1)
BILIRUB UR QL STRIP: NEGATIVE
BUN SERPL-MCNC: 8 MG/DL (ref 5–25)
CALCIUM SERPL-MCNC: 8.4 MG/DL (ref 8.3–10.1)
CHLORIDE SERPL-SCNC: 102 MMOL/L (ref 100–108)
CLARITY UR: CLEAR
CO2 SERPL-SCNC: 29 MMOL/L (ref 21–32)
COLOR UR: YELLOW
CREAT SERPL-MCNC: 0.7 MG/DL (ref 0.6–1.3)
EOSINOPHIL # BLD MANUAL: 0 THOUSAND/UL (ref 0–0.4)
EOSINOPHIL NFR BLD MANUAL: 0 % (ref 0–6)
ERYTHROCYTE [DISTWIDTH] IN BLOOD BY AUTOMATED COUNT: 12 % (ref 11.6–15.1)
GFR SERPL CREATININE-BSD FRML MDRD: 94 ML/MIN/1.73SQ M
GLUCOSE P FAST SERPL-MCNC: 93 MG/DL (ref 65–99)
GLUCOSE SERPL-MCNC: 93 MG/DL (ref 65–140)
GLUCOSE UR STRIP-MCNC: NEGATIVE MG/DL
HCT VFR BLD AUTO: 35.8 % (ref 34.8–46.1)
HGB BLD-MCNC: 12 G/DL (ref 11.5–15.4)
HGB UR QL STRIP.AUTO: NEGATIVE
KETONES UR STRIP-MCNC: NEGATIVE MG/DL
L PNEUMO1 AG UR QL IA.RAPID: NEGATIVE
LEUKOCYTE ESTERASE UR QL STRIP: ABNORMAL
LYMPHOCYTES # BLD AUTO: 0.97 THOUSAND/UL (ref 0.6–4.47)
LYMPHOCYTES # BLD AUTO: 32 % (ref 14–44)
MCH RBC QN AUTO: 31.9 PG (ref 26.8–34.3)
MCHC RBC AUTO-ENTMCNC: 33.5 G/DL (ref 31.4–37.4)
MCV RBC AUTO: 95 FL (ref 82–98)
MONOCYTES # BLD AUTO: 0.06 THOUSAND/UL (ref 0–1.22)
MONOCYTES NFR BLD: 2 % (ref 4–12)
MUCOUS THREADS UR QL AUTO: ABNORMAL
NEUTROPHILS # BLD MANUAL: 1.92 THOUSAND/UL (ref 1.85–7.62)
NEUTS SEG NFR BLD AUTO: 63 % (ref 43–75)
NITRITE UR QL STRIP: NEGATIVE
NON-SQ EPI CELLS URNS QL MICRO: ABNORMAL /HPF
NRBC BLD AUTO-RTO: 0 /100 WBCS
PH UR STRIP.AUTO: 6.5 [PH]
PLATELET # BLD AUTO: 197 THOUSANDS/UL (ref 149–390)
PLATELET BLD QL SMEAR: ADEQUATE
PMV BLD AUTO: 9.7 FL (ref 8.9–12.7)
POTASSIUM SERPL-SCNC: 3.7 MMOL/L (ref 3.5–5.3)
PROCALCITONIN SERPL-MCNC: <0.05 NG/ML
PROT UR STRIP-MCNC: NEGATIVE MG/DL
RBC # BLD AUTO: 3.76 MILLION/UL (ref 3.81–5.12)
RBC #/AREA URNS AUTO: ABNORMAL /HPF
RBC MORPH BLD: NORMAL
S PNEUM AG UR QL: NEGATIVE
SODIUM SERPL-SCNC: 140 MMOL/L (ref 136–145)
SP GR UR STRIP.AUTO: 1.02 (ref 1–1.03)
TOTAL CELLS COUNTED SPEC: 100
UROBILINOGEN UR QL STRIP.AUTO: 0.2 E.U./DL
VARIANT LYMPHS # BLD AUTO: 3 %
WBC # BLD AUTO: 3.04 THOUSAND/UL (ref 4.31–10.16)
WBC #/AREA URNS AUTO: ABNORMAL /HPF

## 2020-04-01 PROCEDURE — 84145 PROCALCITONIN (PCT): CPT | Performed by: FAMILY MEDICINE

## 2020-04-01 PROCEDURE — 81001 URINALYSIS AUTO W/SCOPE: CPT | Performed by: PHYSICIAN ASSISTANT

## 2020-04-01 PROCEDURE — 85027 COMPLETE CBC AUTOMATED: CPT | Performed by: NURSE PRACTITIONER

## 2020-04-01 PROCEDURE — 85007 BL SMEAR W/DIFF WBC COUNT: CPT | Performed by: NURSE PRACTITIONER

## 2020-04-01 PROCEDURE — 87449 NOS EACH ORGANISM AG IA: CPT | Performed by: FAMILY MEDICINE

## 2020-04-01 PROCEDURE — 80048 BASIC METABOLIC PNL TOTAL CA: CPT | Performed by: NURSE PRACTITIONER

## 2020-04-01 PROCEDURE — 93005 ELECTROCARDIOGRAM TRACING: CPT

## 2020-04-01 PROCEDURE — 99232 SBSQ HOSP IP/OBS MODERATE 35: CPT | Performed by: FAMILY MEDICINE

## 2020-04-01 RX ORDER — LIDOCAINE 50 MG/G
1 PATCH TOPICAL DAILY PRN
COMMUNITY
Start: 2020-03-04 | End: 2020-09-30 | Stop reason: ALTCHOICE

## 2020-04-01 RX ORDER — B-COMPLEX WITH VITAMIN C
1 TABLET ORAL 2 TIMES DAILY WITH MEALS
Status: DISCONTINUED | OUTPATIENT
Start: 2020-04-01 | End: 2020-04-06 | Stop reason: HOSPADM

## 2020-04-01 RX ADMIN — PAROXETINE 40 MG: 20 TABLET, FILM COATED ORAL at 08:21

## 2020-04-01 RX ADMIN — STANDARDIZED SENNA CONCENTRATE 8.6 MG: 8.6 TABLET ORAL at 08:21

## 2020-04-01 RX ADMIN — DOCUSATE SODIUM 100 MG: 100 CAPSULE, LIQUID FILLED ORAL at 17:51

## 2020-04-01 RX ADMIN — ENOXAPARIN SODIUM 40 MG: 40 INJECTION SUBCUTANEOUS at 08:20

## 2020-04-01 RX ADMIN — HYDROXYCHLOROQUINE SULFATE 400 MG: 200 TABLET, FILM COATED ORAL at 08:22

## 2020-04-01 RX ADMIN — HYDROCHLOROTHIAZIDE 25 MG: 25 TABLET ORAL at 08:21

## 2020-04-01 RX ADMIN — PRAVASTATIN SODIUM 20 MG: 20 TABLET ORAL at 08:21

## 2020-04-01 RX ADMIN — DOCUSATE SODIUM 100 MG: 100 CAPSULE, LIQUID FILLED ORAL at 08:21

## 2020-04-01 RX ADMIN — Medication 1 TABLET: at 17:51

## 2020-04-01 RX ADMIN — Medication 1 TABLET: at 08:22

## 2020-04-01 RX ADMIN — HYDROXYCHLOROQUINE SULFATE 200 MG: 200 TABLET, FILM COATED ORAL at 17:51

## 2020-04-01 NOTE — PROGRESS NOTES
Discussed in rounds this AM   Admit with  Acute respiratory failure with hypoxia - temp/ cough  MD reviewed and is aware patient is in Observation he anticipates dc today  On 02    CM called and spoke patient baseline information was obtained  04/01/20 1034   Referral Data   Obs Notice Signed 04/01/20   Patient Information   Mental Status Alert   Primary Caregiver Self   Support System Immediate family   Activities of Daily Living Prior to Admission   Functional Status Independent   Living Arrangement House;Lives with someone   Ambulation Independent   Income Information   Income Source Pension/[facility]   Means of Transportation   Means of Transport to Appts: Drive Self     I/pta resides with adult son  Resides in 1st floor setting  No POA/ AD - Son is listed as    + PCP  + Prescription plan uses [facility]   Denies SA/MH hx  Pt PCP is from the [facility], encouraged follow up post dc  Pt is retired   CM reviewed d/c planning process including the following: identifying help at home, patient preference for d/c planning needs, availability of treatment team to discuss questions or concerns patient and/or family may have regarding understanding medications and recognizing signs and symptoms once discharged  CM also encouraged patient to follow up with all recommended appointments after discharge  Patient advised of importance for patient and family to participate in managing patients medical well being  DC plan at this time is home, will continue to follow  Copy of observation form will be given to patient by nursing and the 2nd copy was placed in the bin to be scanned into the record

## 2020-04-01 NOTE — PROGRESS NOTES
Progress Note - Mata Plants 1960, 63 y o  female MRN: [id number]    Unit/Bed#: -01 Encounter: [id number]    Primary Care Provider: Rosalio Max MD   Date and time admitted to hospital: 3/31/2020 12:46 PM        * Acute respiratory failure with hypoxia [facility])  Assessment & Plan  Patient is still requiring supplemental oxygen to maintain saturation  COVID pending  Unknown etiology  Sudden onset  No history of asthma or COPD  Patient denies any recent traveling history within the area or out side of [facility]  Had history of sick contact (patient's son has URI symptoms)  Upon arrival, in the ER patient was saturating 89% on ambulation-condition improves with supplemental oxygen  Patient also tachypneic upon arrival  VBG reviewed  Follow RSV, flu, urine antigen for pneumonia, Legionella, follow procalcitonin, COVID, G6PD  Patient received 1 dose of azithromycin in the ER   we will not start Hydroxychloroquine as per COVID protocol (as patient does qualify-age>60, desaturates to 89% on RA,Hypertension)- IF COVID PCR comes back negative-we will D/C Plaquinil  At this time will avoid any steroid, we will DC lisinopril-as patient under investigation for COVID  CXR: Probable faint peripheral infiltrates at both lung bases  Low lung volumes  Continue respiratory protocol with oxygen supplementation  Monitor EKG everyday for any QT prolongation    Essential hypertension  Assessment & Plan  Controlled  Patient takes lisinopril-hydrochlorothiazide at home  Since patient under investigation for COVID- we will hold Lisinopril now  Continue hydrochlorothiazide    Hypokalemia  Assessment & Plan  Normalized    Other hyperlipidemia  Assessment & Plan  Continue pravastatin        VTE Pharmacologic Prophylaxis:   Pharmacologic: Enoxaparin (Lovenox)  Mechanical VTE Prophylaxis in Place: Yes    Patient Centered Rounds: I have performed bedside rounds with nursing staff today  Time Spent for Care: 30 minutes    More than 50% of total time spent on counseling and coordination of care as described above  Current Length of Stay: 0 day(s)    Current Patient Status: Inpatient   Certification Statement: The patient will continue to require additional inpatient hospital stay due to Acute respiratory failure,    Discharge Plan / Estimated Discharge Date: To be determined      Code Status: Level 1 - Full Code      Subjective:   Seen and evaluated during the round  Patient is still complaining of short of breath and requiring of supplemental oxygen to maintain saturation  Denies any fever, nausea, vomiting  Objective:     Vitals:   Temp (24hrs), Av 5 °F (36 9 °C), Min:97 7 °F (36 5 °C), Max:99 1 °F (37 3 °C)    Temp:  [97 7 °F (36 5 °C)-99 1 °F (37 3 °C)] 98 8 °F (37 1 °C)  HR:  [] 100  Resp:  [18] 18  BP: (94-98)/(52-58) 94/58  SpO2:  [92 %-96 %] 92 %  Body mass index is 29 18 kg/m²  Input and Output Summary (last 24 hours): Intake/Output Summary (Last 24 hours) at 2020 1827  Last data filed at 2020 1701  Gross per 24 hour   Intake 475 ml   Output 775 ml   Net -300 ml       Physical Exam:     Physical Exam   Constitutional: She is oriented to person, place, and time  She appears well-developed  She appears distressed  HENT:   Mouth/Throat: Oropharynx is clear and moist  No oropharyngeal exudate  Eyes: Pupils are equal, round, and reactive to light  Conjunctivae and EOM are normal  No scleral icterus  Neck: Normal range of motion  No JVD present  Cardiovascular: Normal rate  Exam reveals no friction rub  No murmur heard  Pulmonary/Chest: Effort normal  She has rales  Abdominal: Soft  Bowel sounds are normal  She exhibits no distension  There is no tenderness  There is no guarding  Musculoskeletal: Normal range of motion  She exhibits no edema  Neurological: She is alert and oriented to person, place, and time  She displays normal reflexes  No cranial nerve deficit   She exhibits normal muscle tone  Coordination normal    Skin: Skin is warm  Capillary refill takes less than 2 seconds  Psychiatric: She has a normal mood and affect  Additional Data:     Labs:    Results from last 7 days   Lab Units 04/01/20  0452 03/31/20  1341   WBC Thousand/uL 3 04* 3 18*   HEMOGLOBIN g/dL 12 0 13 0   HEMATOCRIT % 35 8 38 1   PLATELETS Thousands/uL 197 206   NEUTROS PCT %  --  66   LYMPHS PCT %  --  26   LYMPHO PCT % 32  --    MONOS PCT %  --  7   MONO PCT % 2*  --    EOS PCT % 0 0     Results from last 7 days   Lab Units 04/01/20  0452 03/31/20  1341   POTASSIUM mmol/L 3 7 3 1*   CHLORIDE mmol/L 102 96*   CO2 mmol/L 29 29   BUN mg/dL 8 11   CREATININE mg/dL 0 70 0 79   CALCIUM mg/dL 8 4 8 6   ALK PHOS U/L  --  63   ALT U/L  --  46   AST U/L  --  44           * I Have Reviewed All Lab Data Listed Above  * Additional Pertinent Lab Tests Reviewed:  All Labs Within Last 24 Hours Reviewed      Recent Cultures (last 7 days):     Results from last 7 days   Lab Units 04/01/20  1110   LEGIONELLA URINARY ANTIGEN  Negative       Last 24 Hours Medication List:     Current Facility-Administered Medications:  albuterol 2 puff Inhalation Q4H PRN Daryn Her MD   aluminum-magnesium hydroxide-simethicone 30 mL Oral Q6H PRN Taylor Her MD   calcium carbonate 1,000 mg Oral Daily PRN Micheal Buredn MD   calcium carbonate-vitamin D 1 tablet Oral BID With Meals Taylor Her MD   docusate sodium 100 mg Oral BID Micheal Burden MD   enoxaparin 40 mg Subcutaneous Daily Micheal Burden MD   hydrochlorothiazide 25 mg Oral Daily Micheal Burden MD   hydroxychloroquine 200 mg Oral BID Micheal Burden MD   PARoxetine 40 mg Oral QAM Micheal Burden MD   pravastatin 20 mg Oral Daily Micheal Burden MD   senna 1 tablet Oral Daily Micheal Burden MD        Today, Patient Was Seen By: Micheal Burden MD    ** Please Note: Dragon 360 Dictation voice to text software may have been used in the creation of this document   **

## 2020-04-01 NOTE — UTILIZATION REVIEW
Initial Clinical Review  OBSERVATION 3/31/20 @ 1507 CONVERTED TO INPATIENT 4/1/20 @1102 DUE TO ACUTE RESPIRATORY FAILURE WITH HYPOXIA  04/01/20 1103  Inpatient Admission Once     Transfer Service: General Medicine       Question Answer Comment   Admitting Physician ST NAEL  [facility], [place] [facility]    Level of Care Med Surg    Estimated length of stay More than 2 Midnights    Certification I certify that inpatient services are medically necessary for this patient for a duration of greater than two midnights  See H&P and MD Progress Notes for additional information about the patient's course of treatment  04/01/20 1102       ED Arrival Information     Expected Arrival Acuity Means of Arrival Escorted By Service Admission Type    - 3/31/2020 12:40 Urgent Walk-In Family Member Hospitalist Urgent    Arrival Complaint    Flu like symptoms (NO TRAVEL)        Chief Complaint   Patient presents with    Flu Symptoms     Pt states for the past 10 days she has had a cough, SOB, on and off fevers, and nausea/diarrhea  Denies any vomiting     Assessment/Plan: 63year old female to the ED from home with complaints of cough, intermittent fever, shortness of breath for the past 10 days  Shortness of breath has been progressively getting worse over the past 2 days  Admitted under observation for acute respiratory failure with hypoxia, essential hypertension, hypokalemia  She has not travelled, no known exposure to [place], but her son does have URI symptoms  She feels body aches and is tired  Arrives to the ED with cough, shortness of breath, has bibasilar rales, is tachypneic  PUlse ox 89% on ambulation, improved with oxygen  CXR shows:  Probable faint peripheral infiltrates at both lung bases  Unknown etiology of hypokalemia  SUpplement given, will recheck  UPdate 4/1:  Continues on O2NC with pulse ox 92%         ED Triage Vitals [03/31/20 1250]   Temperature Pulse Respirations Blood Pressure SpO2   98 3 °F (36 8 °C) 88 18 98/64 93 %      Temp Source Heart Rate Source Patient Position - Orthostatic VS BP Location FiO2 (%)   Temporal Monitor Sitting Right arm --      Pain Score       No Pain        Wt Readings from Last 1 Encounters:   03/31/20 77 1 kg (169 lb 15 6 oz)     Additional Vital Signs:   Date/Time  Temp Pulse Resp  BP  MAP (mmHg)  SpO2  O2 Device Patient Position - Orthostatic VS   04/01/20 0500   86       92 %  None (Room air)    04/01/20 0010  99 1 °F (37 3 °C) 82 18  98/57  71  96 %  Nasal cannula Lying   03/31/20 2040  97 7 °F (36 5 °C) 88 18  96/52  66  94 %  Nasal cannula Lying   03/31/20 1700   74 22      98 %  Nasal cannula    03/31/20 1600      107/55    98 %  Nasal cannula    03/31/20 1500   79 34Abnormal   120/67  87  89 %        Pertinent Labs/Diagnostic Test Results:   3/31 CXR:  Probable faint peripheral infiltrates at both lung bases  Low lung volumes   3/31 EKG:   Interpretation:     Interpretation: normal    Rate:     ECG rate:  91    ECG rate assessment: normal    Rhythm:     Rhythm: sinus rhythm    Ectopy:     Ectopy: none    QRS:     QRS axis:  Normal  Conduction:     Conduction: normal    ST segments:     ST segments:  Normal  T waves:     T waves: normal    Results from last 7 days   Lab Units 04/01/20  0452 03/31/20  1341   WBC Thousand/uL 3 04* 3 18*   HEMOGLOBIN g/dL 12 0 13 0   HEMATOCRIT % 35 8 38 1   PLATELETS Thousands/uL 197 206   NEUTROS ABS Thousands/µL  --  2 09         Results from last 7 days   Lab Units 04/01/20  0452 03/31/20  1341   SODIUM mmol/L 140 135*   POTASSIUM mmol/L 3 7 3 1*   CHLORIDE mmol/L 102 96*   CO2 mmol/L 29 29   ANION GAP mmol/L 9 10   BUN mg/dL 8 11   CREATININE mg/dL 0 70 0 79   EGFR ml/min/1 73sq m 94 81   CALCIUM mg/dL 8 4 8 6     Results from last 7 days   Lab Units 03/31/20  1341   AST U/L 44   ALT U/L 46   ALK PHOS U/L 63   TOTAL PROTEIN g/dL 7 0   ALBUMIN g/dL 3 0*   TOTAL BILIRUBIN mg/dL 0 27         Results from last 7 days   Lab Units 04/01/20  0452 03/31/20  1341   GLUCOSE RANDOM mg/dL 93 123     Results from last 7 days   Lab Units 03/31/20  1341   PH TAMIR  7 451*   PCO2 TAMIR mm Hg 41 1*   PO2 TAMIR mm Hg 35 2   HCO3 TAMIR mmol/L 28 0   BASE EXC TAMIR mmol/L 3 7   O2 CONTENT TAMIR ml/dL 13 3   O2 HGB, VENOUS % 70 0     Results from last 7 days   Lab Units 03/31/20  1341   TROPONIN I ng/mL <0 02       Results from last 7 days   Lab Units 03/31/20  1747   PROCALCITONIN ng/ml <0 05       Results from last 7 days   Lab Units 03/31/20  1341   NT-PRO BNP pg/mL 39       Results from last 7 days   Lab Units 03/31/20  1341   LIPASE u/L 252     Results from last 7 days   Lab Units 03/31/20  1742   INFLUENZA A PCR  None Detected   INFLUENZA B PCR  None Detected   RSV PCR  None Detected     Results from last 7 days   Lab Units 04/01/20  0452   TOTAL COUNTED  100     ED Treatment:   Medication Administration from 03/31/2020 1236 to 03/31/2020 1612       Date/Time Order Dose Route Action     03/31/2020 1342 sodium chloride 0 9 % bolus 1,000 mL 1,000 mL Intravenous New Bag     03/31/2020 1349 albuterol (PROVENTIL HFA,VENTOLIN HFA) inhaler 2 puff 2 puff Inhalation Given     03/31/2020 1455 azithromycin (ZITHROMAX) 500 mg in sodium chloride 0 9 % 250 mL IVPB 500 mg Intravenous New Bag     03/31/2020 1554 potassium chloride (K-DUR,KLOR-CON) CR tablet 40 mEq 40 mEq Oral Given        Past Medical History:   Diagnosis Date    Depression     High cholesterol     Hypertension     Insomnia     Spinal stenosis      Admitting Diagnosis: SOB (shortness of breath) [R06 02]  Flu-like symptoms [R68 89]  Pneumonia of both lower lobes due to infectious organism ([facility] [address] ) [J18 1]  Age/Sex: 63 y o  female  Admission Orders:  Up and OOB  SCDS  Procalcitonin  Urine for legionella antigen, strep pneumoniae  Glucose 6 phosphate dehydrogenase  COVID 19  Scheduled Medications:    Medications:  calcium carbonate-vitamin D 1 tablet Oral BID With Meals   docusate sodium 100 mg Oral BID   enoxaparin 40 mg Subcutaneous Daily   hydrochlorothiazide 25 mg Oral Daily   hydroxychloroquine 200 mg Oral BID   hydroxychloroquine 400 mg Oral BID   PARoxetine 40 mg Oral QAM   pravastatin 20 mg Oral Daily   senna 1 tablet Oral Daily     Continuous IV Infusions:     PRN Meds:    albuterol 2 puff Inhalation Q4H PRN   aluminum-magnesium hydroxide-simethicone 30 mL Oral Q6H PRN   calcium carbonate 1,000 mg Oral Daily PRN       Network Utilization Review Department  [email or web address]  ATTENTION: Please call with any questions or concerns to [phone] and carefully listen to the prompts so that you are directed to the right person  All voicemails are confidential   Dick Akhtar all requests for admission clinical reviews, approved or denied determinations and any other requests to dedicated fax number below belonging to the campus where the patient is receiving treatment   List of dedicated fax numbers for the Facilities:  [address] [place] [address] DENIALS (Administrative/Medical Necessity) [phone]   [address] (Maternity/NICU/Pediatrics) [phone]   Silvano Mendoza [phone]   Nick Deleon [phone]   Patt Rodriges [phone]   Crystal Kramer [phone]   [address] [facility] [address] [place] [address] [phone]   [facility]  [phone]   [address] [facility] [address]  [address] [facility] [address] [facility] [phone]

## 2020-04-01 NOTE — PLAN OF CARE
Problem: PAIN - ADULT  Goal: Verbalizes/displays adequate comfort level or baseline comfort level  Description  Interventions:  - Encourage patient to monitor pain and request assistance  - Assess pain using appropriate pain scale  - Administer analgesics based on type and severity of pain and evaluate response  - Implement non-pharmacological measures as appropriate and evaluate response  - Consider cultural and social influences on pain and pain management  - Notify physician/advanced practitioner if interventions unsuccessful or patient reports new pain  Outcome: Progressing     Problem: INFECTION - ADULT  Goal: Absence or prevention of progression during hospitalization  Description  INTERVENTIONS:  - Assess and monitor for signs and symptoms of infection  - Monitor lab/diagnostic results  - Monitor all insertion sites, i e  indwelling lines, tubes, and drains  - Monitor endotracheal if appropriate and nasal secretions for changes in amount and color  - [place] appropriate cooling/warming therapies per order  - Administer medications as ordered  - Instruct and encourage patient and family to use good hand hygiene technique  - Identify and instruct in appropriate isolation precautions for identified infection/condition  Outcome: Progressing  Goal: Absence of fever/infection during neutropenic period  Description  INTERVENTIONS:  - Monitor WBC    Outcome: Progressing     Problem: SAFETY ADULT  Goal: Patient will remain free of falls  Description  INTERVENTIONS:  - Assess patient frequently for physical needs  -  Identify cognitive and physical deficits and behaviors that affect risk of falls    -  [place] fall precautions as indicated by assessment   - Educate patient/family on patient safety including physical limitations  - Instruct patient to call for assistance with activity based on assessment  - Modify environment to reduce risk of injury  - Consider OT/PT consult to assist with strengthening/mobility  Outcome: Progressing  Goal: Maintain or return to baseline ADL function  Description  INTERVENTIONS:  -  Assess patient's ability to carry out ADLs; assess patient's baseline for ADL function and identify physical deficits which impact ability to perform ADLs (bathing, care of mouth/teeth, toileting, grooming, dressing, etc )  - Assess/evaluate cause of self-care deficits   - Assess range of motion  - Assess patient's mobility; develop plan if impaired  - Assess patient's need for assistive devices and provide as appropriate  - Encourage maximum independence but intervene and supervise when necessary  - Involve family in performance of ADLs  - Assess for home care needs following discharge   - Consider OT consult to assist with ADL evaluation and planning for discharge  - Provide patient education as appropriate  Outcome: Progressing  Goal: Maintain or return mobility status to optimal level  Description  INTERVENTIONS:  - Assess patient's baseline mobility status (ambulation, transfers, stairs, etc )    - Identify cognitive and physical deficits and behaviors that affect mobility  - Identify mobility aids required to assist with transfers and/or ambulation (gait belt, sit-to-stand, lift, walker, cane, etc )  - [place] fall precautions as indicated by assessment  - Record patient progress and toleration of activity level on Mobility SBAR; progress patient to next Phase/Stage  - Instruct patient to call for assistance with activity based on assessment  - Consider rehabilitation consult to assist with strengthening/weightbearing, etc   Outcome: Progressing     Problem: DISCHARGE PLANNING  Goal: Discharge to home or other facility with appropriate resources  Description  INTERVENTIONS:  - Identify barriers to discharge w/patient and caregiver  - Arrange for needed discharge resources and transportation as appropriate  - Identify discharge learning needs (meds, wound care, etc )  - Arrange for interpretive services to assist at discharge as needed  - Refer to Case Management Department for coordinating discharge planning if the patient needs post-hospital services based on physician/advanced practitioner order or complex needs related to functional status, cognitive ability, or social support system  Outcome: Progressing     Problem: Knowledge Deficit  Goal: Patient/family/caregiver demonstrates understanding of disease process, treatment plan, medications, and discharge instructions  Description  Complete learning assessment and assess knowledge base  Interventions:  - Provide teaching at level of understanding  - Provide teaching via preferred learning methods  Outcome: Progressing     Problem: Potential for Falls  Goal: Patient will remain free of falls  Description  INTERVENTIONS:  - Assess patient frequently for physical needs  -  Identify cognitive and physical deficits and behaviors that affect risk of falls  -  [place] fall precautions as indicated by assessment   - Educate patient/family on patient safety including physical limitations  - Instruct patient to call for assistance with activity based on assessment  - Modify environment to reduce risk of injury  - Consider OT/PT consult to assist with strengthening/mobility  Outcome: Progressing     Problem: Nutrition/Hydration-ADULT  Goal: Nutrient/Hydration intake appropriate for improving, restoring or maintaining nutritional needs  Description  Monitor and assess patient's nutrition/hydration status for malnutrition  Collaborate with interdisciplinary team and initiate plan and interventions as ordered  Monitor patient's weight and dietary intake as ordered or per policy  Utilize nutrition screening tool and intervene as necessary  Determine patient's food preferences and provide high-protein, high-caloric foods as appropriate       INTERVENTIONS:  - Monitor oral intake, urinary output, labs, and treatment plans  - Assess nutrition and hydration status and recommend course of action  - Evaluate amount of meals eaten  - Assist patient with eating if necessary   - Allow adequate time for meals  - Recommend/ encourage appropriate diets, oral nutritional supplements, and vitamin/mineral supplements  - Order, calculate, and assess calorie counts as needed  - Recommend, monitor, and adjust tube feedings and TPN/PPN based on assessed needs  - Assess need for intravenous fluids  - Provide specific nutrition/hydration education as appropriate  - Include patient/family/caregiver in decisions related to nutrition  Outcome: Progressing

## 2020-04-01 NOTE — ASSESSMENT & PLAN NOTE
Patient is still requiring supplemental oxygen to maintain saturation  COVID pending  Unknown etiology  Sudden onset  No history of asthma or COPD  Patient denies any recent traveling history within the area or out side of [facility]  Had history of sick contact (patient's son has URI symptoms)  Upon arrival, in the ER patient was saturating 89% on ambulation-condition improves with supplemental oxygen  Patient also tachypneic upon arrival  VBG reviewed  Follow RSV, flu, urine antigen for pneumonia, Legionella, follow procalcitonin, COVID, G6PD  Patient received 1 dose of azithromycin in the ER   we will not start Hydroxychloroquine as per COVID protocol (as patient does qualify-age>60, desaturates to 89% on RA,Hypertension)- IF COVID PCR comes back negative-we will D/C Plaquinil  At this time will avoid any steroid, we will DC lisinopril-as patient under investigation for COVID  CXR: Probable faint peripheral infiltrates at both lung bases  Low lung volumes  Continue respiratory protocol with oxygen supplementation  Monitor EKG everyday for any QT prolongation

## 2020-04-02 PROBLEM — J06.9 ACUTE RESPIRATORY DISEASE DUE TO COVID-19 VIRUS: Status: ACTIVE | Noted: 2020-04-02

## 2020-04-02 PROBLEM — U07.1 ACUTE RESPIRATORY DISEASE DUE TO COVID-19 VIRUS: Status: ACTIVE | Noted: 2020-04-02

## 2020-04-02 LAB
ALBUMIN SERPL BCP-MCNC: 2.7 G/DL (ref 3.5–5)
ALP SERPL-CCNC: 62 U/L (ref 46–116)
ALT SERPL W P-5'-P-CCNC: 42 U/L (ref 12–78)
ANION GAP SERPL CALCULATED.3IONS-SCNC: 9 MMOL/L (ref 4–13)
AST SERPL W P-5'-P-CCNC: 32 U/L (ref 5–45)
ATRIAL RATE: 81 BPM
ATRIAL RATE: 83 BPM
ATRIAL RATE: 91 BPM
BILIRUB SERPL-MCNC: 0.2 MG/DL (ref 0.2–1)
BUN SERPL-MCNC: 9 MG/DL (ref 5–25)
CALCIUM SERPL-MCNC: 8.9 MG/DL (ref 8.3–10.1)
CHLORIDE SERPL-SCNC: 100 MMOL/L (ref 100–108)
CO2 SERPL-SCNC: 30 MMOL/L (ref 21–32)
CREAT SERPL-MCNC: 0.7 MG/DL (ref 0.6–1.3)
ERYTHROCYTE [DISTWIDTH] IN BLOOD BY AUTOMATED COUNT: 11.9 % (ref 11.6–15.1)
GFR SERPL CREATININE-BSD FRML MDRD: 94 ML/MIN/1.73SQ M
GLUCOSE SERPL-MCNC: 112 MG/DL (ref 65–140)
GLUCOSE SERPL-MCNC: 148 MG/DL (ref 65–140)
HCT VFR BLD AUTO: 37.3 % (ref 34.8–46.1)
HGB BLD-MCNC: 12.6 G/DL (ref 11.5–15.4)
MCH RBC QN AUTO: 32.2 PG (ref 26.8–34.3)
MCHC RBC AUTO-ENTMCNC: 33.8 G/DL (ref 31.4–37.4)
MCV RBC AUTO: 95 FL (ref 82–98)
NRBC BLD AUTO-RTO: 0 /100 WBCS
P AXIS: 45 DEGREES
P AXIS: 46 DEGREES
P AXIS: 56 DEGREES
PLATELET # BLD AUTO: 243 THOUSANDS/UL (ref 149–390)
PMV BLD AUTO: 9.6 FL (ref 8.9–12.7)
POTASSIUM SERPL-SCNC: 3.3 MMOL/L (ref 3.5–5.3)
PR INTERVAL: 168 MS
PR INTERVAL: 172 MS
PR INTERVAL: 174 MS
PROT SERPL-MCNC: 6.7 G/DL (ref 6.4–8.2)
QRS AXIS: 20 DEGREES
QRS AXIS: 21 DEGREES
QRS AXIS: 24 DEGREES
QRSD INTERVAL: 80 MS
QRSD INTERVAL: 82 MS
QRSD INTERVAL: 82 MS
QT INTERVAL: 364 MS
QT INTERVAL: 364 MS
QT INTERVAL: 376 MS
QTC INTERVAL: 427 MS
QTC INTERVAL: 436 MS
QTC INTERVAL: 447 MS
RBC # BLD AUTO: 3.91 MILLION/UL (ref 3.81–5.12)
SARS-COV-2 RNA SPEC QL NAA+PROBE: DETECTED
SODIUM SERPL-SCNC: 139 MMOL/L (ref 136–145)
T WAVE AXIS: 36 DEGREES
T WAVE AXIS: 45 DEGREES
T WAVE AXIS: 50 DEGREES
VENTRICULAR RATE: 81 BPM
VENTRICULAR RATE: 83 BPM
VENTRICULAR RATE: 91 BPM
WBC # BLD AUTO: 3.09 THOUSAND/UL (ref 4.31–10.16)

## 2020-04-02 PROCEDURE — 80053 COMPREHEN METABOLIC PANEL: CPT | Performed by: FAMILY MEDICINE

## 2020-04-02 PROCEDURE — 85027 COMPLETE CBC AUTOMATED: CPT | Performed by: FAMILY MEDICINE

## 2020-04-02 PROCEDURE — 82948 REAGENT STRIP/BLOOD GLUCOSE: CPT

## 2020-04-02 PROCEDURE — 99233 SBSQ HOSP IP/OBS HIGH 50: CPT | Performed by: FAMILY MEDICINE

## 2020-04-02 PROCEDURE — 93010 ELECTROCARDIOGRAM REPORT: CPT | Performed by: INTERNAL MEDICINE

## 2020-04-02 PROCEDURE — 93005 ELECTROCARDIOGRAM TRACING: CPT

## 2020-04-02 PROCEDURE — G0427 INPT/ED TELECONSULT70: HCPCS | Performed by: INTERNAL MEDICINE

## 2020-04-02 RX ORDER — POTASSIUM CHLORIDE 20 MEQ/1
40 TABLET, EXTENDED RELEASE ORAL ONCE
Status: COMPLETED | OUTPATIENT
Start: 2020-04-02 | End: 2020-04-02

## 2020-04-02 RX ADMIN — Medication 1 TABLET: at 17:08

## 2020-04-02 RX ADMIN — POTASSIUM CHLORIDE 40 MEQ: 1500 TABLET, EXTENDED RELEASE ORAL at 12:43

## 2020-04-02 RX ADMIN — DOCUSATE SODIUM 100 MG: 100 CAPSULE, LIQUID FILLED ORAL at 09:32

## 2020-04-02 RX ADMIN — STANDARDIZED SENNA CONCENTRATE 8.6 MG: 8.6 TABLET ORAL at 09:32

## 2020-04-02 RX ADMIN — DOCUSATE SODIUM 100 MG: 100 CAPSULE, LIQUID FILLED ORAL at 17:08

## 2020-04-02 RX ADMIN — HYDROXYCHLOROQUINE SULFATE 200 MG: 200 TABLET, FILM COATED ORAL at 17:08

## 2020-04-02 RX ADMIN — Medication 1 TABLET: at 07:40

## 2020-04-02 RX ADMIN — HYDROXYCHLOROQUINE SULFATE 200 MG: 200 TABLET, FILM COATED ORAL at 09:32

## 2020-04-02 RX ADMIN — ENOXAPARIN SODIUM 40 MG: 40 INJECTION SUBCUTANEOUS at 09:33

## 2020-04-02 RX ADMIN — PAROXETINE 40 MG: 20 TABLET, FILM COATED ORAL at 09:32

## 2020-04-02 RX ADMIN — HYDROCHLOROTHIAZIDE 25 MG: 25 TABLET ORAL at 09:32

## 2020-04-02 RX ADMIN — PRAVASTATIN SODIUM 20 MG: 20 TABLET ORAL at 09:32

## 2020-04-02 NOTE — ASSESSMENT & PLAN NOTE
Patient's COVID test came back positive  Patient already on Plaquenil-continue  Patient is requiring 3 L of oxygen to maintain good saturation  Continue monitor  Infectious disease consultation appreciated

## 2020-04-02 NOTE — SOCIAL WORK
CM placed phone call into pts room to inform her of her Medicare rights, the right to appeal discharge, pt verbalizes understanding

## 2020-04-02 NOTE — PLAN OF CARE
Problem: PAIN - ADULT  Goal: Verbalizes/displays adequate comfort level or baseline comfort level  Description  Interventions:  - Encourage patient to monitor pain and request assistance  - Assess pain using appropriate pain scale  - Administer analgesics based on type and severity of pain and evaluate response  - Implement non-pharmacological measures as appropriate and evaluate response  - Consider cultural and social influences on pain and pain management  - Notify physician/advanced practitioner if interventions unsuccessful or patient reports new pain  Outcome: Progressing     Problem: INFECTION - ADULT  Goal: Absence or prevention of progression during hospitalization  Description  INTERVENTIONS:  - Assess and monitor for signs and symptoms of infection  - Monitor lab/diagnostic results  - Monitor all insertion sites, i e  indwelling lines, tubes, and drains  - Monitor endotracheal if appropriate and nasal secretions for changes in amount and color  - [place] appropriate cooling/warming therapies per order  - Administer medications as ordered  - Instruct and encourage patient and family to use good hand hygiene technique  - Identify and instruct in appropriate isolation precautions for identified infection/condition  Outcome: Progressing  Goal: Absence of fever/infection during neutropenic period  Description  INTERVENTIONS:  - Monitor WBC    Outcome: Progressing     Problem: SAFETY ADULT  Goal: Patient will remain free of falls  Description  INTERVENTIONS:  - Assess patient frequently for physical needs  -  Identify cognitive and physical deficits and behaviors that affect risk of falls    -  [place] fall precautions as indicated by assessment   - Educate patient/family on patient safety including physical limitations  - Instruct patient to call for assistance with activity based on assessment  - Modify environment to reduce risk of injury  - Consider OT/PT consult to assist with strengthening/mobility  Outcome: Progressing  Goal: Maintain or return to baseline ADL function  Description  INTERVENTIONS:  -  Assess patient's ability to carry out ADLs; assess patient's baseline for ADL function and identify physical deficits which impact ability to perform ADLs (bathing, care of mouth/teeth, toileting, grooming, dressing, etc )  - Assess/evaluate cause of self-care deficits   - Assess range of motion  - Assess patient's mobility; develop plan if impaired  - Assess patient's need for assistive devices and provide as appropriate  - Encourage maximum independence but intervene and supervise when necessary  - Involve family in performance of ADLs  - Assess for home care needs following discharge   - Consider OT consult to assist with ADL evaluation and planning for discharge  - Provide patient education as appropriate  Outcome: Progressing  Goal: Maintain or return mobility status to optimal level  Description  INTERVENTIONS:  - Assess patient's baseline mobility status (ambulation, transfers, stairs, etc )    - Identify cognitive and physical deficits and behaviors that affect mobility  - Identify mobility aids required to assist with transfers and/or ambulation (gait belt, sit-to-stand, lift, walker, cane, etc )  - [place] fall precautions as indicated by assessment  - Record patient progress and toleration of activity level on Mobility SBAR; progress patient to next Phase/Stage  - Instruct patient to call for assistance with activity based on assessment  - Consider rehabilitation consult to assist with strengthening/weightbearing, etc   Outcome: Progressing     Problem: DISCHARGE PLANNING  Goal: Discharge to home or other facility with appropriate resources  Description  INTERVENTIONS:  - Identify barriers to discharge w/patient and caregiver  - Arrange for needed discharge resources and transportation as appropriate  - Identify discharge learning needs (meds, wound care, etc )  - Arrange for interpretive services to assist at discharge as needed  - Refer to Case Management Department for coordinating discharge planning if the patient needs post-hospital services based on physician/advanced practitioner order or complex needs related to functional status, cognitive ability, or social support system  Outcome: Progressing     Problem: Knowledge Deficit  Goal: Patient/family/caregiver demonstrates understanding of disease process, treatment plan, medications, and discharge instructions  Description  Complete learning assessment and assess knowledge base  Interventions:  - Provide teaching at level of understanding  - Provide teaching via preferred learning methods  Outcome: Progressing     Problem: Potential for Falls  Goal: Patient will remain free of falls  Description  INTERVENTIONS:  - Assess patient frequently for physical needs  -  Identify cognitive and physical deficits and behaviors that affect risk of falls  -  [place] fall precautions as indicated by assessment   - Educate patient/family on patient safety including physical limitations  - Instruct patient to call for assistance with activity based on assessment  - Modify environment to reduce risk of injury  - Consider OT/PT consult to assist with strengthening/mobility  Outcome: Progressing     Problem: Nutrition/Hydration-ADULT  Goal: Nutrient/Hydration intake appropriate for improving, restoring or maintaining nutritional needs  Description  Monitor and assess patient's nutrition/hydration status for malnutrition  Collaborate with interdisciplinary team and initiate plan and interventions as ordered  Monitor patient's weight and dietary intake as ordered or per policy  Utilize nutrition screening tool and intervene as necessary  Determine patient's food preferences and provide high-protein, high-caloric foods as appropriate       INTERVENTIONS:  - Monitor oral intake, urinary output, labs, and treatment plans  - Assess nutrition and hydration status and recommend course of action  - Evaluate amount of meals eaten  - Assist patient with eating if necessary   - Allow adequate time for meals  - Recommend/ encourage appropriate diets, oral nutritional supplements, and vitamin/mineral supplements  - Order, calculate, and assess calorie counts as needed  - Recommend, monitor, and adjust tube feedings and TPN/PPN based on assessed needs  - Assess need for intravenous fluids  - Provide specific nutrition/hydration education as appropriate  - Include patient/family/caregiver in decisions related to nutrition  Outcome: Progressing

## 2020-04-02 NOTE — TELEMEDICINE
Consultation - Infectious Disease   Ravi Barkley 63 y o  female MRN: [id number]  Unit/Bed#: -01 Encounter: [id number]      Inpatient consult to Infectious Diseases  Consult performed by: Teresita Mercado MD  Consult ordered by: Isaura Cash MD          REQUIRED DOCUMENTATION:     1  This service was provided via Telemedicine  2  Provider located at Home  3  TeleMed provider: Teresita Mercado MD   4  Identify all parties in room with patient during tele consult:RN  5  After connecting through [employer], patient was identified by name and date of birth and assistant checked wristband  Patient was then informed that this was a Telemedicine visit and that the exam was being conducted confidentially over secure lines  My office door was closed  No one else was in the room  Patient acknowledged consent and understanding of privacy and security of the Telemedicine visit, and gave us permission to have the assistant stay in the room in order to assist with the history and to conduct the exam   I informed the patient that I have reviewed their record in Epic and presented the opportunity for them to ask any questions regarding the visit today  The patient agreed to participate  Assessment/Recommendations     63year old female with hypertension presents with pneumonia due to SARS- CoV-2    1  COVID-19 infection with acute hypoxia  - PCR positive, abnormal CXR, afebrile with exertional dyspnea/persistent but stable hypoxia  - Clinically stable at this time, over 14 days of symptoms    · Continue hydroxychloroquine 200 q12h through 4/5  · Continue supportive care, close monitoring of respiratory status  · Await results of G6PD  · Normal baseline EKG  Check CBC, glucose daily while on therapy  · Continue telemetry monitoring  · Continue isolation precautions  · If patient clinically improves can dc home with self isolation/oxygen until at least 3 days after resolution of all symptoms     2  Hypertension    · Continue supportive care    Thank you for involving me in the care of your patient  Please call with questions, change in clinical status or if tests recommended above are abnormal      Discussed with the primary service  History     Reason for Consult: COVID 19 infection  HPI: Hilaria Ashby is a 63y o  year old female with hypertension who presented to the ER on 3/31 with 7-10 days of intermittent fever, nausea, diarrhea, dry cough and 2 days of progressively shortness of breath with minimal exertion  No recent travel but son has been sick with URI symptoms  He has not been tested for covid 19  She has no prior chronic lung disease, she does not smoke  In the ER, vitals were stable but o2 sats were 89% with ambulation  Labs were notable for mild leukopenia  EKG noted no acute ST changes  CXR showed some faint peripheral infiltrates at both lung bases  She was started on hydroxychloroquine  COVID-19 testing is positive  She has remained afebrile with oxygen requirements of 2-3 lpm  She continues to have an intermittent dry cough and exertional dyspnea but all symptoms are stable  Denies nausea, vomiting, diarrhea or rash  Infectious disease is being consulted for diagnostic work up and antibiotic management  Review of Systems  Pertinent positives and negatives as noted in HPI  Rest complete 12 point system-based review of systems is otherwise negative      PAST MEDICAL HISTORY:  Past Medical History:   Diagnosis Date    Depression     High cholesterol     Hypertension     Insomnia     Spinal stenosis      Past Surgical History:   Procedure Laterality Date     SECTION      ENDOMETRIAL ABLATION      TUBAL LIGATION         FAMILY HISTORY:  Non-contributory    SOCIAL HISTORY:  Social History   Single  Social History     Substance and Sexual Activity   Alcohol Use Yes    Frequency: Monthly or less     Social History     Substance and Sexual Activity   Drug Use Not Currently     Social History     Tobacco Use   Smoking Status Never Smoker   Smokeless Tobacco Never Used       ALLERGIES:  No Known Allergies    MEDICATIONS:  All current active medications have been reviewed  Physical Exam     Temp:  [95 8 °F (35 4 °C)-97 7 °F (36 5 °C)] 97 7 °F (36 5 °C)  HR:  [68-84] 70  Resp:  [16-24] 24  BP: (106-115)/(59-70) 106/59  SpO2:  [94 %-98 %] 98 %  Temp (24hrs), Av °F (36 1 °C), Min:95 8 °F (35 4 °C), Max:97 7 °F (36 5 °C)  Current: Temperature: 97 7 °F (36 5 °C)    Intake/Output Summary (Last 24 hours) at 2020 1110  Last data filed at 2020 0101  Gross per 24 hour   Intake    Output 550 ml   Net -550 ml       Physical exam findings reported by bedside and primary medical team staff    General Appearance:  Appearing tired but nontoxic, and in no distress, appears stated age   Head:  Normocephalic, without obvious abnormality, atraumatic   Eyes:  PERRL, conjunctiva pink and sclera anicteric, both eyes   Nose: Nares normal, mucosa normal, no drainage   Throat: Oropharynx moist without lesions; lips, mucosa, and tongue normal; teeth and gums normal   Neck: Supple, symmetrical, trachea midline, no adenopathy, no tenderness/mass/nodules   Back:   Symmetric, no curvature, ROM normal, no CVA tenderness   Lungs:   Clear but diminished to auscultation bilaterally, no audible wheezes, rhonchi and rales, respirations unlabored   Chest Wall:  No tenderness or deformity   Heart:  Regular rate and rhythm, S1, S2 normal, no murmur, rub or gallop   Abdomen:   Soft, non-tender, non-distended, positive bowel sounds, no masses, no organomegaly    No CVA tenderness   Extremities: Extremities normal, atraumatic, no cyanosis, clubbing or edema   Skin: Skin color, texture, turgor normal, no rashes or lesions  No draining wounds noted     Lymph nodes: Cervical, supraclavicular, and axillary nodes normal   Neurologic: Alert and oriented times 3, extremity strength 5/5 and symmetric Invasive Devices:   Peripheral IV 03/31/20 Right Antecubital (Active)   Site Assessment Clean;Dry; Intact 4/2/2020  8:00 AM   Dressing Type Transparent 4/2/2020  8:00 AM   Line Status Saline locked; Flushed 4/2/2020  8:00 AM   Dressing Status Clean;Dry; Intact 4/2/2020  8:00 AM   Dressing Change Due 04/04/20 4/2/2020  8:00 AM   Reason Not Rotated Not due 4/2/2020  8:00 AM       Labs, Imaging, & Other Studies     Lab Results:    I have personally reviewed pertinent labs  Results from last 7 days   Lab Units 04/02/20  0942 04/01/20  0452 03/31/20  1341   WBC Thousand/uL 3 09* 3 04* 3 18*   HEMOGLOBIN g/dL 12 6 12 0 13 0   PLATELETS Thousands/uL 243 197 206     Results from last 7 days   Lab Units 04/01/20  0452 03/31/20  1341   POTASSIUM mmol/L 3 7 3 1*   CHLORIDE mmol/L 102 96*   CO2 mmol/L 29 29   BUN mg/dL 8 11   CREATININE mg/dL 0 70 0 79   EGFR ml/min/1 73sq m 94 81   CALCIUM mg/dL 8 4 8 6   AST U/L  --  44   ALT U/L  --  46   ALK PHOS U/L  --  63     Results from last 7 days   Lab Units 04/01/20  1110   LEGIONELLA URINARY ANTIGEN  Negative       Imaging Studies:   I have personally reviewed pertinent imaging study reports and images in PACS  EKG, Pathology, and Other Studies:   I have personally reviewed pertinent reports  Counseling/Coordination of care: Total 70 minutes communication with the patient via telehealth  Labs, medical tests and imaging studies were independently reviewed by me as noted above in HPI and old records were obtained and summarized as noted above in HPI

## 2020-04-02 NOTE — PROGRESS NOTES
Progress Note - Mickey Busby 1960, 63 y o  female MRN: [id number]    Unit/Bed#: -01 Encounter: [id number]    Primary Care Provider: Carmina Sierra MD   Date and time admitted to hospital: 3/31/2020 12:46 PM        Acute respiratory failure with hypoxia [facility])  Assessment & Plan  Patient is still requiring supplemental oxygen to maintain saturation  COVID pending  Unknown etiology  Sudden onset  No history of asthma or COPD  Patient denies any recent traveling history within the area or out side of [facility]  Had history of sick contact (patient's son has URI symptoms)  Upon arrival, in the ER patient was saturating 89% on ambulation-condition improves with supplemental oxygen  Secondary to COVID  Continue treatment at [facility], continue supplemental oxygen  Monitor EKG everyday for any QT prolongation    * Acute respiratory disease due to COVID-19 virus  Assessment & Plan  Patient's COVID test came back positive  Patient already on Plaquenil-continue  Patient is requiring 3 L of oxygen to maintain good saturation  Continue monitor  Infectious disease consultation appreciated    Essential hypertension  Assessment & Plan  Controlled  Patient takes lisinopril-hydrochlorothiazide at home  Since patient under investigation for COVID- we will hold Lisinopril now  Continue hydrochlorothiazide    Hypokalemia  Assessment & Plan  Continue supplementation    Other hyperlipidemia  Assessment & Plan  Continue pravastatin      VTE Pharmacologic Prophylaxis:   Pharmacologic: Enoxaparin (Lovenox)  Mechanical VTE Prophylaxis in Place: Yes    Patient Centered Rounds: I have performed bedside rounds with nursing staff today  Discussions with Specialists or Other Care Team Provider: ID    Education and Discussions with Family / Patient: none    Time Spent for Care: 30 minutes  More than 50% of total time spent on counseling and coordination of care as described above      Current Length of Stay: 1 day(s)    Current Patient Status: Inpatient   Certification Statement: The patient will continue to require additional inpatient hospital stay due to COVID respiratory infection, acute respiratory failure    Discharge Plan / Estimated Discharge Date: To be determined      Code Status: Level 1 - Full Code      Subjective:   Seen and evaluated during the round  Feels tired and fatigued  Also feels some short of breath especially with walking short distance  Denies any chest pain, any dizziness, lightheadedness  Objective:     Vitals:   Temp (24hrs), Av °F (36 1 °C), Min:95 8 °F (35 4 °C), Max:97 7 °F (36 5 °C)    Temp:  [95 8 °F (35 4 °C)-97 7 °F (36 5 °C)] 97 7 °F (36 5 °C)  HR:  [67-84] 73  Resp:  [16-26] 26  BP: (106-124)/(59-70) 117/65  SpO2:  [94 %-98 %] 97 %  Body mass index is 29 18 kg/m²  Input and Output Summary (last 24 hours): Intake/Output Summary (Last 24 hours) at 2020 1548  Last data filed at 2020 1201  Gross per 24 hour   Intake 720 ml   Output 950 ml   Net -230 ml       Physical Exam:     Physical Exam   Constitutional: She is oriented to person, place, and time  She appears ill  HENT:   Mouth/Throat: Oropharynx is clear and moist  No oropharyngeal exudate  Eyes: Pupils are equal, round, and reactive to light  Conjunctivae and EOM are normal  No scleral icterus  Neck: Normal range of motion  No JVD present  Cardiovascular: Normal rate  Exam reveals no gallop and no friction rub  Pulmonary/Chest: Effort normal  She has rales (mild bibasilar)  Abdominal: Soft  Bowel sounds are normal  She exhibits no distension  There is no tenderness  There is no guarding  Musculoskeletal: She exhibits no edema  Neurological: She is alert and oriented to person, place, and time  She displays normal reflexes  No cranial nerve deficit  She exhibits normal muscle tone  Coordination normal    Skin: Skin is warm  Capillary refill takes less than 2 seconds     Psychiatric: She has a normal mood and affect  Nursing note and vitals reviewed  Additional Data:     Labs:    Results from last 7 days   Lab Units 04/02/20  0942 04/01/20  0452 03/31/20  1341   WBC Thousand/uL 3 09* 3 04* 3 18*   HEMOGLOBIN g/dL 12 6 12 0 13 0   HEMATOCRIT % 37 3 35 8 38 1   PLATELETS Thousands/uL 243 197 206   NEUTROS PCT %  --   --  66   LYMPHS PCT %  --   --  26   LYMPHO PCT %  --  32  --    MONOS PCT %  --   --  7   MONO PCT %  --  2*  --    EOS PCT %  --  0 0     Results from last 7 days   Lab Units 04/02/20  1109   POTASSIUM mmol/L 3 3*   CHLORIDE mmol/L 100   CO2 mmol/L 30   BUN mg/dL 9   CREATININE mg/dL 0 70   CALCIUM mg/dL 8 9   ALK PHOS U/L 62   ALT U/L 42   AST U/L 32           * I Have Reviewed All Lab Data Listed Above  * Additional Pertinent Lab Tests Reviewed: All Labs Within Last 24 Hours Reviewed      Recent Cultures (last 7 days):     Results from last 7 days   Lab Units 04/01/20  1110   LEGIONELLA URINARY ANTIGEN  Negative       Last 24 Hours Medication List:     Current Facility-Administered Medications:  albuterol 2 puff Inhalation Q4H PRN Christofer Her MD   aluminum-magnesium hydroxide-simethicone 30 mL Oral Q6H PRN Taylor Her MD   calcium carbonate 1,000 mg Oral Daily PRN Ana Pedersen MD   calcium carbonate-vitamin D 1 tablet Oral BID With Meals Taylor Her MD   docusate sodium 100 mg Oral BID Ana Pedersen MD   enoxaparin 40 mg Subcutaneous Daily Ana Pedersen MD   hydrochlorothiazide 25 mg Oral Daily Ana Pedersen MD   hydroxychloroquine 200 mg Oral BID Ana Pedersen MD   PARoxetine 40 mg Oral QAM Ana Pedersen MD   pravastatin 20 mg Oral Daily Ana Pedersen MD   senna 1 tablet Oral Daily Ana Pedersen MD        Today, Patient Was Seen By: Ana Pedersen MD    ** Please Note: Dragon 360 Dictation voice to text software may have been used in the creation of this document   **

## 2020-04-02 NOTE — ASSESSMENT & PLAN NOTE
Patient is still requiring supplemental oxygen to maintain saturation  COVID pending  Unknown etiology  Sudden onset  No history of asthma or COPD  Patient denies any recent traveling history within the area or out side of [facility]  Had history of sick contact (patient's son has URI symptoms)  Upon arrival, in the ER patient was saturating 89% on ambulation-condition improves with supplemental oxygen  Secondary to COVID  Continue treatment at [facility], continue supplemental oxygen  Monitor EKG everyday for any QT prolongation

## 2020-04-03 LAB
ALBUMIN SERPL BCP-MCNC: 2.8 G/DL (ref 3.5–5)
ALP SERPL-CCNC: 64 U/L (ref 46–116)
ALT SERPL W P-5'-P-CCNC: 41 U/L (ref 12–78)
ANION GAP SERPL CALCULATED.3IONS-SCNC: 7 MMOL/L (ref 4–13)
AST SERPL W P-5'-P-CCNC: 31 U/L (ref 5–45)
BILIRUB SERPL-MCNC: 0.26 MG/DL (ref 0.2–1)
BUN SERPL-MCNC: 9 MG/DL (ref 5–25)
CALCIUM SERPL-MCNC: 9 MG/DL (ref 8.3–10.1)
CHLORIDE SERPL-SCNC: 101 MMOL/L (ref 100–108)
CO2 SERPL-SCNC: 32 MMOL/L (ref 21–32)
CREAT SERPL-MCNC: 0.8 MG/DL (ref 0.6–1.3)
ERYTHROCYTE [DISTWIDTH] IN BLOOD BY AUTOMATED COUNT: 11.7 % (ref 11.6–15.1)
G6PD BLD QN: 211 U/10E12 RBC (ref 146–376)
GFR SERPL CREATININE-BSD FRML MDRD: 80 ML/MIN/1.73SQ M
GLUCOSE SERPL-MCNC: 101 MG/DL (ref 65–140)
HCT VFR BLD AUTO: 37.9 % (ref 34.8–46.1)
HGB BLD-MCNC: 12.9 G/DL (ref 11.5–15.4)
MCH RBC QN AUTO: 32.6 PG (ref 26.8–34.3)
MCHC RBC AUTO-ENTMCNC: 34 G/DL (ref 31.4–37.4)
MCV RBC AUTO: 96 FL (ref 82–98)
NRBC BLD AUTO-RTO: 0 /100 WBCS
PLATELET # BLD AUTO: 255 THOUSANDS/UL (ref 149–390)
PMV BLD AUTO: 9.5 FL (ref 8.9–12.7)
POTASSIUM SERPL-SCNC: 4.2 MMOL/L (ref 3.5–5.3)
PROT SERPL-MCNC: 6.8 G/DL (ref 6.4–8.2)
RBC # BLD AUTO: 3.66 X10E6/UL (ref 3.77–5.28)
RBC # BLD AUTO: 3.96 MILLION/UL (ref 3.81–5.12)
SODIUM SERPL-SCNC: 140 MMOL/L (ref 136–145)
WBC # BLD AUTO: 3.48 THOUSAND/UL (ref 4.31–10.16)

## 2020-04-03 PROCEDURE — 80053 COMPREHEN METABOLIC PANEL: CPT | Performed by: FAMILY MEDICINE

## 2020-04-03 PROCEDURE — 99232 SBSQ HOSP IP/OBS MODERATE 35: CPT | Performed by: FAMILY MEDICINE

## 2020-04-03 PROCEDURE — 85027 COMPLETE CBC AUTOMATED: CPT | Performed by: FAMILY MEDICINE

## 2020-04-03 RX ADMIN — PAROXETINE 40 MG: 20 TABLET, FILM COATED ORAL at 09:32

## 2020-04-03 RX ADMIN — HYDROCHLOROTHIAZIDE 25 MG: 25 TABLET ORAL at 09:32

## 2020-04-03 RX ADMIN — Medication 1 TABLET: at 09:32

## 2020-04-03 RX ADMIN — HYDROXYCHLOROQUINE SULFATE 200 MG: 200 TABLET, FILM COATED ORAL at 17:02

## 2020-04-03 RX ADMIN — Medication 1 TABLET: at 17:02

## 2020-04-03 RX ADMIN — ENOXAPARIN SODIUM 40 MG: 40 INJECTION SUBCUTANEOUS at 09:32

## 2020-04-03 RX ADMIN — DOCUSATE SODIUM 100 MG: 100 CAPSULE, LIQUID FILLED ORAL at 17:02

## 2020-04-03 RX ADMIN — STANDARDIZED SENNA CONCENTRATE 8.6 MG: 8.6 TABLET ORAL at 09:32

## 2020-04-03 RX ADMIN — PRAVASTATIN SODIUM 20 MG: 20 TABLET ORAL at 09:32

## 2020-04-03 RX ADMIN — DOCUSATE SODIUM 100 MG: 100 CAPSULE, LIQUID FILLED ORAL at 09:32

## 2020-04-03 RX ADMIN — HYDROXYCHLOROQUINE SULFATE 200 MG: 200 TABLET, FILM COATED ORAL at 09:32

## 2020-04-03 NOTE — PROGRESS NOTES
Progress Note - Faisal Barroso 1960, 63 y o  female MRN: [id number]    Unit/Bed#: -01 Encounter: [id number]    Primary Care Provider: Mickey Leonard MD   Date and time admitted to hospital: 3/31/2020 12:46 PM        Acute respiratory failure with hypoxia [facility])  Assessment & Plan  Secondary to COVID  Patient is on Plaquenil treatment  QT interval is 339 as per telemetry  As per patient, she is feeling much better compared to yesterday    * Acute respiratory disease due to COVID-19 virus  Assessment & Plan  Patient's COVID test came back positive  Patient already on Plaquenil-continue  ID consult appreciated  As per patient reports she feels much better compared to yesterday  Patient is requiring 3 L of oxygen to maintain good saturation  Continue monitor  Infectious disease consultation appreciated  QTc in Telemetry is 339    Essential hypertension  Assessment & Plan  Controlled  Patient takes lisinopril-hydrochlorothiazide at home  Since patient under investigation for COVID- we will hold Lisinopril now  Continue hydrochlorothiazide    Hypokalemia  Assessment & Plan  Resolved with supplement    Other hyperlipidemia  Assessment & Plan  Since patient has COVID-we will Hold Pravastatin now      VTE Pharmacologic Prophylaxis:   Pharmacologic: Enoxaparin (Lovenox)  Mechanical VTE Prophylaxis in Place: Yes    Patient Centered Rounds: I have performed bedside rounds with nursing staff today  Time Spent for Care: 30 minutes  More than 50% of total time spent on counseling and coordination of care as described above  Current Length of Stay: 2 day(s)    Current Patient Status: Inpatient   Certification Statement: The patient will continue to require additional inpatient hospital stay due to [place], Respiratory failure    Discharge Plan / Estimated Discharge Date: To be determined      Code Status: Level 1 - Full Code      Subjective:   Seen and evaluated during the    Patient reports she feels much better regarding her breath compared to yesterday  Patient reports she is able to go to restroom from her bed now with mild dyspnea  Denies any fever, any blood in stool or sputum  Objective:     Vitals:   Temp (24hrs), Av 6 °F (36 4 °C), Min:96 1 °F (35 6 °C), Max:98 3 °F (36 8 °C)    Temp:  [96 1 °F (35 6 °C)-98 3 °F (36 8 °C)] 96 1 °F (35 6 °C)  HR:  [68-89] 86  Resp:  [22-38] 27  BP: (107-139)/(54-80) 114/62  SpO2:  [93 %-100 %] 95 %  Body mass index is 29 18 kg/m²  Input and Output Summary (last 24 hours): Intake/Output Summary (Last 24 hours) at 4/3/2020 1440  Last data filed at 4/3/2020 1000  Gross per 24 hour   Intake 600 ml   Output    Net 600 ml       Physical Exam:     Physical Exam   Constitutional: She is oriented to person, place, and time  She appears well-developed  No distress  HENT:   Mouth/Throat: Oropharynx is clear and moist  No oropharyngeal exudate  Eyes: Pupils are equal, round, and reactive to light  EOM are normal  No scleral icterus  Neck: Normal range of motion  No JVD present  No thyromegaly present  Cardiovascular: Normal rate  Exam reveals no gallop and no friction rub  Pulmonary/Chest: Effort normal  She has rales (minimal bibasilar)  Abdominal: Soft  Bowel sounds are normal  She exhibits no distension and no mass  There is no tenderness  There is no guarding  Musculoskeletal: Normal range of motion  She exhibits no edema  Neurological: She is alert and oriented to person, place, and time  She displays normal reflexes  No cranial nerve deficit  Coordination normal    Skin: Skin is warm  Capillary refill takes less than 2 seconds  Psychiatric: She has a normal mood and affect  Nursing note and vitals reviewed      Additional Data:     Labs:    Results from last 7 days   Lab Units 20  0509  20  0452 20  1341   WBC Thousand/uL 3 48*   < > 3 04* 3 18*   HEMOGLOBIN g/dL 12 9   < > 12 0 13 0   HEMATOCRIT % 37 9   < > 35 8 38 1   PLATELETS Thousands/uL 255   < > 197 206   NEUTROS PCT %  --   --   --  66   LYMPHS PCT %  --   --   --  26   LYMPHO PCT %  --   --  32  --    MONOS PCT %  --   --   --  7   MONO PCT %  --   --  2*  --    EOS PCT %  --   --  0 0    < > = values in this interval not displayed  Results from last 7 days   Lab Units 04/03/20  0509   POTASSIUM mmol/L 4 2   CHLORIDE mmol/L 101   CO2 mmol/L 32   BUN mg/dL 9   CREATININE mg/dL 0 80   CALCIUM mg/dL 9 0   ALK PHOS U/L 64   ALT U/L 41   AST U/L 31           * I Have Reviewed All Lab Data Listed Above  * Additional Pertinent Lab Tests Reviewed: All Labs Within Last 24 Hours Reviewed    Recent Cultures (last 7 days):     Results from last 7 days   Lab Units 04/01/20  1110   LEGIONELLA URINARY ANTIGEN  Negative       Last 24 Hours Medication List:     Current Facility-Administered Medications:  albuterol 2 puff Inhalation Q4H PRN Catherne Latosha Her MD   aluminum-magnesium hydroxide-simethicone 30 mL Oral Q6H PRN Taylor Her MD   calcium carbonate 1,000 mg Oral Daily PRN Andrés Munoz MD   calcium carbonate-vitamin D 1 tablet Oral BID With Meals Taylor Her MD   docusate sodium 100 mg Oral BID Andrés Munoz MD   enoxaparin 40 mg Subcutaneous Daily Andrés Munoz MD   hydrochlorothiazide 25 mg Oral Daily Andrés Munoz MD   hydroxychloroquine 200 mg Oral BID Andrés Munoz MD   PARoxetine 40 mg Oral QAM Andrés Munoz MD   senna 1 tablet Oral Daily Andrés Munoz MD        Today, Patient Was Seen By: Andrés Munoz MD    ** Please Note: Dragon 360 Dictation voice to text software may have been used in the creation of this document   **

## 2020-04-03 NOTE — PLAN OF CARE
Problem: PAIN - ADULT  Goal: Verbalizes/displays adequate comfort level or baseline comfort level  Description  Interventions:  - Encourage patient to monitor pain and request assistance  - Assess pain using appropriate pain scale  - Administer analgesics based on type and severity of pain and evaluate response  - Implement non-pharmacological measures as appropriate and evaluate response  - Consider cultural and social influences on pain and pain management  - Notify physician/advanced practitioner if interventions unsuccessful or patient reports new pain  Outcome: Progressing     Problem: INFECTION - ADULT  Goal: Absence or prevention of progression during hospitalization  Description  INTERVENTIONS:  - Assess and monitor for signs and symptoms of infection  - Monitor lab/diagnostic results  - Monitor all insertion sites, i e  indwelling lines, tubes, and drains  - Monitor endotracheal if appropriate and nasal secretions for changes in amount and color  - [place] appropriate cooling/warming therapies per order  - Administer medications as ordered  - Instruct and encourage patient and family to use good hand hygiene technique  - Identify and instruct in appropriate isolation precautions for identified infection/condition  Outcome: Progressing  Goal: Absence of fever/infection during neutropenic period  Description  INTERVENTIONS:  - Monitor WBC    Outcome: Progressing     Problem: SAFETY ADULT  Goal: Patient will remain free of falls  Description  INTERVENTIONS:  - Assess patient frequently for physical needs  -  Identify cognitive and physical deficits and behaviors that affect risk of falls    -  [place] fall precautions as indicated by assessment   - Educate patient/family on patient safety including physical limitations  - Instruct patient to call for assistance with activity based on assessment  - Modify environment to reduce risk of injury  - Consider OT/PT consult to assist with strengthening/mobility  Outcome: Progressing  Goal: Maintain or return to baseline ADL function  Description  INTERVENTIONS:  -  Assess patient's ability to carry out ADLs; assess patient's baseline for ADL function and identify physical deficits which impact ability to perform ADLs (bathing, care of mouth/teeth, toileting, grooming, dressing, etc )  - Assess/evaluate cause of self-care deficits   - Assess range of motion  - Assess patient's mobility; develop plan if impaired  - Assess patient's need for assistive devices and provide as appropriate  - Encourage maximum independence but intervene and supervise when necessary  - Involve family in performance of ADLs  - Assess for home care needs following discharge   - Consider OT consult to assist with ADL evaluation and planning for discharge  - Provide patient education as appropriate  Outcome: Progressing  Goal: Maintain or return mobility status to optimal level  Description  INTERVENTIONS:  - Assess patient's baseline mobility status (ambulation, transfers, stairs, etc )    - Identify cognitive and physical deficits and behaviors that affect mobility  - Identify mobility aids required to assist with transfers and/or ambulation (gait belt, sit-to-stand, lift, walker, cane, etc )  - [place] fall precautions as indicated by assessment  - Record patient progress and toleration of activity level on Mobility SBAR; progress patient to next Phase/Stage  - Instruct patient to call for assistance with activity based on assessment  - Consider rehabilitation consult to assist with strengthening/weightbearing, etc   Outcome: Progressing     Problem: DISCHARGE PLANNING  Goal: Discharge to home or other facility with appropriate resources  Description  INTERVENTIONS:  - Identify barriers to discharge w/patient and caregiver  - Arrange for needed discharge resources and transportation as appropriate  - Identify discharge learning needs (meds, wound care, etc )  - Arrange for interpretive services to assist at discharge as needed  - Refer to Case Management Department for coordinating discharge planning if the patient needs post-hospital services based on physician/advanced practitioner order or complex needs related to functional status, cognitive ability, or social support system  Outcome: Progressing     Problem: Knowledge Deficit  Goal: Patient/family/caregiver demonstrates understanding of disease process, treatment plan, medications, and discharge instructions  Description  Complete learning assessment and assess knowledge base  Interventions:  - Provide teaching at level of understanding  - Provide teaching via preferred learning methods  Outcome: Progressing     Problem: Potential for Falls  Goal: Patient will remain free of falls  Description  INTERVENTIONS:  - Assess patient frequently for physical needs  -  Identify cognitive and physical deficits and behaviors that affect risk of falls  -  [place] fall precautions as indicated by assessment   - Educate patient/family on patient safety including physical limitations  - Instruct patient to call for assistance with activity based on assessment  - Modify environment to reduce risk of injury  - Consider OT/PT consult to assist with strengthening/mobility  Outcome: Progressing     Problem: Nutrition/Hydration-ADULT  Goal: Nutrient/Hydration intake appropriate for improving, restoring or maintaining nutritional needs  Description  Monitor and assess patient's nutrition/hydration status for malnutrition  Collaborate with interdisciplinary team and initiate plan and interventions as ordered  Monitor patient's weight and dietary intake as ordered or per policy  Utilize nutrition screening tool and intervene as necessary  Determine patient's food preferences and provide high-protein, high-caloric foods as appropriate       INTERVENTIONS:  - Monitor oral intake, urinary output, labs, and treatment plans  - Assess nutrition and hydration status and recommend course of action  - Evaluate amount of meals eaten  - Assist patient with eating if necessary   - Allow adequate time for meals  - Recommend/ encourage appropriate diets, oral nutritional supplements, and vitamin/mineral supplements  - Order, calculate, and assess calorie counts as needed  - Recommend, monitor, and adjust tube feedings and TPN/PPN based on assessed needs  - Assess need for intravenous fluids  - Provide specific nutrition/hydration education as appropriate  - Include patient/family/caregiver in decisions related to nutrition  Outcome: Progressing

## 2020-04-03 NOTE — ASSESSMENT & PLAN NOTE
Secondary to COVID  Patient is on Plaquenil treatment  QT interval is 339 as per telemetry  As per patient, she is feeling much better compared to yesterday

## 2020-04-03 NOTE — ASSESSMENT & PLAN NOTE
Patient's COVID test came back positive  Patient already on Plaquenil-continue  ID consult appreciated  As per patient reports she feels much better compared to yesterday  Patient is requiring 3 L of oxygen to maintain good saturation  Continue monitor  Infectious disease consultation appreciated  QTc in Telemetry is 339

## 2020-04-03 NOTE — PLAN OF CARE
Problem: PAIN - ADULT  Goal: Verbalizes/displays adequate comfort level or baseline comfort level  Description  Interventions:  - Encourage patient to monitor pain and request assistance  - Assess pain using appropriate pain scale  - Administer analgesics based on type and severity of pain and evaluate response  - Implement non-pharmacological measures as appropriate and evaluate response  - Consider cultural and social influences on pain and pain management  - Notify physician/advanced practitioner if interventions unsuccessful or patient reports new pain  Outcome: Progressing     Problem: INFECTION - ADULT  Goal: Absence or prevention of progression during hospitalization  Description  INTERVENTIONS:  - Assess and monitor for signs and symptoms of infection  - Monitor lab/diagnostic results  - Monitor all insertion sites, i e  indwelling lines, tubes, and drains  - Monitor endotracheal if appropriate and nasal secretions for changes in amount and color  - [place] appropriate cooling/warming therapies per order  - Administer medications as ordered  - Instruct and encourage patient and family to use good hand hygiene technique  - Identify and instruct in appropriate isolation precautions for identified infection/condition  Outcome: Progressing  Goal: Absence of fever/infection during neutropenic period  Description  INTERVENTIONS:  - Monitor WBC    Outcome: Progressing     Problem: SAFETY ADULT  Goal: Patient will remain free of falls  Description  INTERVENTIONS:  - Assess patient frequently for physical needs  -  Identify cognitive and physical deficits and behaviors that affect risk of falls    -  [place] fall precautions as indicated by assessment   - Educate patient/family on patient safety including physical limitations  - Instruct patient to call for assistance with activity based on assessment  - Modify environment to reduce risk of injury  - Consider OT/PT consult to assist with strengthening/mobility  Outcome: Progressing  Goal: Maintain or return to baseline ADL function  Description  INTERVENTIONS:  -  Assess patient's ability to carry out ADLs; assess patient's baseline for ADL function and identify physical deficits which impact ability to perform ADLs (bathing, care of mouth/teeth, toileting, grooming, dressing, etc )  - Assess/evaluate cause of self-care deficits   - Assess range of motion  - Assess patient's mobility; develop plan if impaired  - Assess patient's need for assistive devices and provide as appropriate  - Encourage maximum independence but intervene and supervise when necessary  - Involve family in performance of ADLs  - Assess for home care needs following discharge   - Consider OT consult to assist with ADL evaluation and planning for discharge  - Provide patient education as appropriate  Outcome: Progressing  Goal: Maintain or return mobility status to optimal level  Description  INTERVENTIONS:  - Assess patient's baseline mobility status (ambulation, transfers, stairs, etc )    - Identify cognitive and physical deficits and behaviors that affect mobility  - Identify mobility aids required to assist with transfers and/or ambulation (gait belt, sit-to-stand, lift, walker, cane, etc )  - [place] fall precautions as indicated by assessment  - Record patient progress and toleration of activity level on Mobility SBAR; progress patient to next Phase/Stage  - Instruct patient to call for assistance with activity based on assessment  - Consider rehabilitation consult to assist with strengthening/weightbearing, etc   Outcome: Progressing     Problem: DISCHARGE PLANNING  Goal: Discharge to home or other facility with appropriate resources  Description  INTERVENTIONS:  - Identify barriers to discharge w/patient and caregiver  - Arrange for needed discharge resources and transportation as appropriate  - Identify discharge learning needs (meds, wound care, etc )  - Arrange for interpretive services to assist at discharge as needed  - Refer to Case Management Department for coordinating discharge planning if the patient needs post-hospital services based on physician/advanced practitioner order or complex needs related to functional status, cognitive ability, or social support system  Outcome: Progressing     Problem: Knowledge Deficit  Goal: Patient/family/caregiver demonstrates understanding of disease process, treatment plan, medications, and discharge instructions  Description  Complete learning assessment and assess knowledge base  Interventions:  - Provide teaching at level of understanding  - Provide teaching via preferred learning methods  Outcome: Progressing     Problem: Potential for Falls  Goal: Patient will remain free of falls  Description  INTERVENTIONS:  - Assess patient frequently for physical needs  -  Identify cognitive and physical deficits and behaviors that affect risk of falls  -  [place] fall precautions as indicated by assessment   - Educate patient/family on patient safety including physical limitations  - Instruct patient to call for assistance with activity based on assessment  - Modify environment to reduce risk of injury  - Consider OT/PT consult to assist with strengthening/mobility  Outcome: Progressing     Problem: Nutrition/Hydration-ADULT  Goal: Nutrient/Hydration intake appropriate for improving, restoring or maintaining nutritional needs  Description  Monitor and assess patient's nutrition/hydration status for malnutrition  Collaborate with interdisciplinary team and initiate plan and interventions as ordered  Monitor patient's weight and dietary intake as ordered or per policy  Utilize nutrition screening tool and intervene as necessary  Determine patient's food preferences and provide high-protein, high-caloric foods as appropriate       INTERVENTIONS:  - Monitor oral intake, urinary output, labs, and treatment plans  - Assess nutrition and hydration status and recommend course of action  - Evaluate amount of meals eaten  - Assist patient with eating if necessary   - Allow adequate time for meals  - Recommend/ encourage appropriate diets, oral nutritional supplements, and vitamin/mineral supplements  - Order, calculate, and assess calorie counts as needed  - Recommend, monitor, and adjust tube feedings and TPN/PPN based on assessed needs  - Assess need for intravenous fluids  - Provide specific nutrition/hydration education as appropriate  - Include patient/family/caregiver in decisions related to nutrition  Outcome: Progressing

## 2020-04-03 NOTE — PROGRESS NOTES
Pt with varied intake, reported taste alterations to RN  +COVID 19, Pt does not wish to speak with pt over the phone  Did not receive protocol yet to adjust pt's diet  Recommend regular diet with ensure compact BID

## 2020-04-04 LAB
ANION GAP SERPL CALCULATED.3IONS-SCNC: 8 MMOL/L (ref 4–13)
BUN SERPL-MCNC: 12 MG/DL (ref 5–25)
CALCIUM SERPL-MCNC: 8.4 MG/DL (ref 8.3–10.1)
CHLORIDE SERPL-SCNC: 100 MMOL/L (ref 100–108)
CO2 SERPL-SCNC: 32 MMOL/L (ref 21–32)
CREAT SERPL-MCNC: 0.84 MG/DL (ref 0.6–1.3)
GFR SERPL CREATININE-BSD FRML MDRD: 75 ML/MIN/1.73SQ M
GLUCOSE SERPL-MCNC: 107 MG/DL (ref 65–140)
POTASSIUM SERPL-SCNC: 3.8 MMOL/L (ref 3.5–5.3)
SODIUM SERPL-SCNC: 140 MMOL/L (ref 136–145)

## 2020-04-04 PROCEDURE — 99232 SBSQ HOSP IP/OBS MODERATE 35: CPT | Performed by: FAMILY MEDICINE

## 2020-04-04 PROCEDURE — 80048 BASIC METABOLIC PNL TOTAL CA: CPT | Performed by: FAMILY MEDICINE

## 2020-04-04 RX ADMIN — STANDARDIZED SENNA CONCENTRATE 8.6 MG: 8.6 TABLET ORAL at 09:12

## 2020-04-04 RX ADMIN — Medication 1 TABLET: at 09:11

## 2020-04-04 RX ADMIN — PAROXETINE 40 MG: 20 TABLET, FILM COATED ORAL at 09:12

## 2020-04-04 RX ADMIN — Medication 1 TABLET: at 17:01

## 2020-04-04 RX ADMIN — HYDROCHLOROTHIAZIDE 25 MG: 25 TABLET ORAL at 09:12

## 2020-04-04 RX ADMIN — ENOXAPARIN SODIUM 40 MG: 40 INJECTION SUBCUTANEOUS at 09:12

## 2020-04-04 RX ADMIN — HYDROXYCHLOROQUINE SULFATE 200 MG: 200 TABLET, FILM COATED ORAL at 09:12

## 2020-04-04 RX ADMIN — HYDROXYCHLOROQUINE SULFATE 200 MG: 200 TABLET, FILM COATED ORAL at 17:01

## 2020-04-04 NOTE — PROGRESS NOTES
Progress Note - Paulino Golden 1960, 63 y o  female MRN: [id number]    Unit/Bed#: -01 Encounter: [id number]    Primary Care Provider: Deangelo Ramirez MD   Date and time admitted to hospital: 3/31/2020 12:46 PM        Acute respiratory failure with hypoxia [facility])  Assessment & Plan  Secondary to COVID  Patient is on Plaquenil treatment  QT interval is 344 as per telemetry  As per patient, she is feeling much better compared to yesterday    * Acute respiratory disease due to COVID-19 virus  Assessment & Plan  Clinically doing well  Patient already on Plaquenil-continue  ID consult appreciated  Patient is requiring 3 L of oxygen to maintain good saturation  Continue monitor  QTc in Telemetry is 344    Essential hypertension  Assessment & Plan  Controlled  Patient takes lisinopril-hydrochlorothiazide at home  Since patient under investigation for COVID- we will hold Lisinopril now  Continue hydrochlorothiazide    Hypokalemia  Assessment & Plan  Resolved with supplement    Other hyperlipidemia  Assessment & Plan  Since patient has COVID-we will Hold Pravastatin now        VTE Pharmacologic Prophylaxis:   Pharmacologic: Enoxaparin (Lovenox)  Mechanical VTE Prophylaxis in Place: Yes    Patient Centered Rounds: I have performed bedside rounds with nursing staff today  Time Spent for Care: 30 minutes  More than 50% of total time spent on counseling and coordination of care as described above  Current Length of Stay: 3 day(s)    Current Patient Status: Inpatient   Certification Statement: The patient will continue to require additional inpatient hospital stay due to Acute respiratory disease due to COVID, acute respiratory failure    Discharge Plan / Estimated Discharge Date: To be determined      Code Status: Level 1 - Full Code      Subjective:   Seen and evaluated during the round  Patient reports she feels much better    Denies any headache, blurry vision, myalgia, nausea, vomiting, abdominal pain, any problem with urination or defecation  Objective:     Vitals:   Temp (24hrs), Av 9 °F (36 6 °C), Min:97 8 °F (36 6 °C), Max:98 1 °F (36 7 °C)    Temp:  [97 8 °F (36 6 °C)-98 1 °F (36 7 °C)] 97 8 °F (36 6 °C)  HR:  [73-86] 77  Resp:  [18-27] 18  BP: (114-123)/(61-71) 123/66  SpO2:  [95 %-96 %] 95 %  Body mass index is 29 18 kg/m²  Input and Output Summary (last 24 hours): Intake/Output Summary (Last 24 hours) at 2020 1155  Last data filed at 4/3/2020 1730  Gross per 24 hour   Intake 240 ml   Output    Net 240 ml       Physical Exam:     Physical Exam   Constitutional: She is oriented to person, place, and time  She appears well-developed  No distress  HENT:   Mouth/Throat: Oropharynx is clear and moist  No oropharyngeal exudate  Eyes: Pupils are equal, round, and reactive to light  Conjunctivae and EOM are normal    Neck: Normal range of motion  No JVD present  No thyromegaly present  Cardiovascular: Normal rate  Exam reveals no gallop and no friction rub  Pulmonary/Chest: Effort normal  No respiratory distress  She has no wheezes  She has rales (right lungs)  Abdominal: Soft  Bowel sounds are normal  She exhibits no distension and no mass  There is no tenderness  There is no guarding  Musculoskeletal: Normal range of motion  She exhibits no edema  Neurological: She is alert and oriented to person, place, and time  She displays normal reflexes  No cranial nerve deficit  Coordination normal    Skin: Skin is warm  Capillary refill takes less than 2 seconds  Psychiatric: She has a normal mood and affect  Nursing note and vitals reviewed      Additional Data:     Labs:    Results from last 7 days   Lab Units 20  0509  20  0452 20  1341   WBC Thousand/uL 3 48*   < > 3 04* 3 18*   HEMOGLOBIN g/dL 12 9   < > 12 0 13 0   HEMATOCRIT % 37 9   < > 35 8 38 1   PLATELETS Thousands/uL 255   < > 197 206   NEUTROS PCT %  --   --   --  66   LYMPHS PCT %  --   --   --  26 LYMPHO PCT %  --   --  32  --    MONOS PCT %  --   --   --  7   MONO PCT %  --   --  2*  --    EOS PCT %  --   --  0 0    < > = values in this interval not displayed  Results from last 7 days   Lab Units 04/04/20  0536 04/03/20  0509   POTASSIUM mmol/L 3 8 4 2   CHLORIDE mmol/L 100 101   CO2 mmol/L 32 32   BUN mg/dL 12 9   CREATININE mg/dL 0 84 0 80   CALCIUM mg/dL 8 4 9 0   ALK PHOS U/L  --  64   ALT U/L  --  41   AST U/L  --  31           * I Have Reviewed All Lab Data Listed Above  * Additional Pertinent Lab Tests Reviewed: All Labs Within Last 24 Hours Reviewed    Recent Cultures (last 7 days):     Results from last 7 days   Lab Units 04/01/20  1110   LEGIONELLA URINARY ANTIGEN  Negative       Last 24 Hours Medication List:     Current Facility-Administered Medications:  albuterol 2 puff Inhalation Q4H PRN Jessica Dorcas Her MD   aluminum-magnesium hydroxide-simethicone 30 mL Oral Q6H PRN Taylor Her MD   calcium carbonate 1,000 mg Oral Daily PRN Jacinta Paulino MD   calcium carbonate-vitamin D 1 tablet Oral BID With Meals Taylor Her MD   docusate sodium 100 mg Oral BID Jacinta Paulino MD   enoxaparin 40 mg Subcutaneous Daily Jacinta Paulino MD   hydrochlorothiazide 25 mg Oral Daily Jacinta Paulino MD   hydroxychloroquine 200 mg Oral BID Jacinta Paulino MD   PARoxetine 40 mg Oral QAM Jacinta Paulino MD   senna 1 tablet Oral Daily Jacinta Paulino MD        Today, Patient Was Seen By: Jacinta Paulino MD    ** Please Note: Dragon 360 Dictation voice to text software may have been used in the creation of this document   **

## 2020-04-04 NOTE — PLAN OF CARE
Problem: PAIN - ADULT  Goal: Verbalizes/displays adequate comfort level or baseline comfort level  Description  Interventions:  - Encourage patient to monitor pain and request assistance  - Assess pain using appropriate pain scale  - Administer analgesics based on type and severity of pain and evaluate response  - Implement non-pharmacological measures as appropriate and evaluate response  - Consider cultural and social influences on pain and pain management  - Notify physician/advanced practitioner if interventions unsuccessful or patient reports new pain  Outcome: Progressing     Problem: INFECTION - ADULT  Goal: Absence or prevention of progression during hospitalization  Description  INTERVENTIONS:  - Assess and monitor for signs and symptoms of infection  - Monitor lab/diagnostic results  - Monitor all insertion sites,   - Monitor endotracheal if appropriate and nasal secretions for changes in amount and color  - [place] appropriate cooling/warming therapies per order  - Administer medications as ordered  - Instruct and encourage patient and family to use good hand hygiene technique  - Identify and instruct in appropriate isolation precautions for identified infection/condition   Outcome: Progressing  Goal: Absence of fever/infection during neutropenic period  Description  INTERVENTIONS:  - Monitor WBC    Outcome: Progressing     Problem: SAFETY ADULT  Goal: Patient will remain free of falls  Description  INTERVENTIONS:  - Assess patient frequently for physical needs  -  Identify cognitive and physical deficits and behaviors that affect risk of falls    -  [place] fall precautions as indicated by assessment   - Educate patient/family on patient safety including physical limitations  - Instruct patient to call for assistance with activity based on assessment  - Modify environment to reduce risk of injury  - Consider OT/PT consult to assist with strengthening/mobility  Outcome: Progressing  Goal: Maintain or return to baseline ADL function  Description  INTERVENTIONS:  -  Assess patient's ability to carry out ADLs; assess patient's baseline for ADL function and identify physical deficits which impact ability to perform ADLs (bathing, care of mouth/teeth, toileting, grooming, dressing, etc )  - Assess/evaluate cause of self-care deficits   - Assess range of motion  - Assess patient's mobility; develop plan if impaired  - Assess patient's need for assistive devices and provide as appropriate  - Encourage maximum independence but intervene and supervise when necessary  - Involve family in performance of ADLs  - Assess for home care needs following discharge   - Consider OT consult to assist with ADL evaluation and planning for discharge  - Provide patient education as appropriate  Outcome: Progressing  Goal: Maintain or return mobility status to optimal level  Description  INTERVENTIONS:  - Assess patient's baseline mobility status (ambulation, transfers, stairs, etc )    - Identify cognitive and physical deficits and behaviors that affect mobility  - Identify mobility aids required to assist with transfers and/or ambulation (gait belt, sit-to-stand, lift, walker, cane, etc )  - [place] fall precautions as indicated by assessment  - Record patient progress and toleration of activity level on Mobility SBAR; progress patient to next Phase/Stage  - Instruct patient to call for assistance with activity based on assessment  - Consider rehabilitation consult to assist with strengthening/weightbearing, etc   Outcome: Progressing     Problem: DISCHARGE PLANNING  Goal: Discharge to home or other facility with appropriate resources  Description  INTERVENTIONS:  - Identify barriers to discharge w/patient and caregiver  - Arrange for needed discharge resources and transportation as appropriate  - Identify discharge learning needs (meds, wound care, etc )  - Arrange for interpretive services to assist at discharge as needed  - Refer to Case Management Department for coordinating discharge planning if the patient needs post-hospital services based on physician/advanced practitioner order or complex needs related to functional status, cognitive ability, or social support system  Outcome: Progressing     Problem: Knowledge Deficit  Goal: Patient/family/caregiver demonstrates understanding of disease process, treatment plan, medications, and discharge instructions  Description  Complete learning assessment and assess knowledge base  Interventions:  - Provide teaching at level of understanding  - Provide teaching via preferred learning methods  Outcome: Progressing     Problem: Potential for Falls  Goal: Patient will remain free of falls  Description  INTERVENTIONS:  - Assess patient frequently for physical needs  -  Identify cognitive and physical deficits and behaviors that affect risk of falls  -  [place] fall precautions as indicated by assessment   - Educate patient/family on patient safety including physical limitations  - Instruct patient to call for assistance with activity based on assessment  - Modify environment to reduce risk of injury  - Consider OT/PT consult to assist with strengthening/mobility  Outcome: Progressing     Problem: Nutrition/Hydration-ADULT  Goal: Nutrient/Hydration intake appropriate for improving, restoring or maintaining nutritional needs  Description  Monitor and assess patient's nutrition/hydration status for malnutrition  Collaborate with interdisciplinary team and initiate plan and interventions as ordered  Monitor patient's weight and dietary intake as ordered or per policy  Utilize nutrition screening tool and intervene as necessary  Determine patient's food preferences and provide high-protein, high-caloric foods as appropriate       INTERVENTIONS:  - Monitor oral intake, urinary output, labs, and treatment plans  - Assess nutrition and hydration status and recommend course of action  - Evaluate amount of meals eaten  - Assist patient with eating if necessary   - Allow adequate time for meals  - Recommend/ encourage appropriate diets, oral nutritional supplements, and vitamin/mineral supplements  - Order, calculate, and assess calorie counts as needed  - Recommend, monitor, and adjust tube feedings and TPN/PPN based on assessed needs  - Assess need for intravenous fluids  - Provide specific nutrition/hydration education as appropriate  - Include patient/family/caregiver in decisions related to nutrition  Outcome: Progressing     Problem: RESPIRATORY - ADULT  Goal: Achieves optimal ventilation and oxygenation  Description  INTERVENTIONS:  - Assess for changes in respiratory status  - Assess for changes in mentation and behavior  - Position to facilitate oxygenation and minimize respiratory effort  - Oxygen administered by appropriate delivery if ordered  - Initiate smoking cessation education as indicated  - Encourage broncho-pulmonary hygiene including cough, deep breathe, Incentive Spirometry  - Assess the need for suctioning and aspirate as needed  - Assess and instruct to report SOB or any respiratory difficulty  - Respiratory Therapy support as indicated  Outcome: Progressing

## 2020-04-04 NOTE — ASSESSMENT & PLAN NOTE
Secondary to COVID  Patient is on Plaquenil treatment  QT interval is 344 as per telemetry  As per patient, she is feeling much better compared to yesterday

## 2020-04-04 NOTE — ASSESSMENT & PLAN NOTE
Clinically doing well  Patient already on Plaquenil-continue  ID consult appreciated  Patient is requiring 3 L of oxygen to maintain good saturation  Continue monitor  QTc in Telemetry is 344

## 2020-04-05 LAB
ALBUMIN SERPL BCP-MCNC: 2.9 G/DL (ref 3.5–5)
ALP SERPL-CCNC: 65 U/L (ref 46–116)
ALT SERPL W P-5'-P-CCNC: 37 U/L (ref 12–78)
ANION GAP SERPL CALCULATED.3IONS-SCNC: 8 MMOL/L (ref 4–13)
AST SERPL W P-5'-P-CCNC: 26 U/L (ref 5–45)
BASOPHILS # BLD MANUAL: 0 THOUSAND/UL (ref 0–0.1)
BASOPHILS NFR MAR MANUAL: 0 % (ref 0–1)
BILIRUB SERPL-MCNC: 0.28 MG/DL (ref 0.2–1)
BUN SERPL-MCNC: 12 MG/DL (ref 5–25)
CALCIUM SERPL-MCNC: 8.9 MG/DL (ref 8.3–10.1)
CHLORIDE SERPL-SCNC: 101 MMOL/L (ref 100–108)
CO2 SERPL-SCNC: 32 MMOL/L (ref 21–32)
CREAT SERPL-MCNC: 0.84 MG/DL (ref 0.6–1.3)
EOSINOPHIL # BLD MANUAL: 0.14 THOUSAND/UL (ref 0–0.4)
EOSINOPHIL NFR BLD MANUAL: 3 % (ref 0–6)
ERYTHROCYTE [DISTWIDTH] IN BLOOD BY AUTOMATED COUNT: 11.5 % (ref 11.6–15.1)
GFR SERPL CREATININE-BSD FRML MDRD: 75 ML/MIN/1.73SQ M
GLUCOSE SERPL-MCNC: 101 MG/DL (ref 65–140)
HCT VFR BLD AUTO: 38.3 % (ref 34.8–46.1)
HGB BLD-MCNC: 12.9 G/DL (ref 11.5–15.4)
LYMPHOCYTES # BLD AUTO: 1.65 THOUSAND/UL (ref 0.6–4.47)
LYMPHOCYTES # BLD AUTO: 35 % (ref 14–44)
MCH RBC QN AUTO: 31.8 PG (ref 26.8–34.3)
MCHC RBC AUTO-ENTMCNC: 33.7 G/DL (ref 31.4–37.4)
MCV RBC AUTO: 94 FL (ref 82–98)
MONOCYTES # BLD AUTO: 0.47 THOUSAND/UL (ref 0–1.22)
MONOCYTES NFR BLD: 10 % (ref 4–12)
NEUTROPHILS # BLD MANUAL: 2.45 THOUSAND/UL (ref 1.85–7.62)
NEUTS BAND NFR BLD MANUAL: 1 % (ref 0–8)
NEUTS SEG NFR BLD AUTO: 51 % (ref 43–75)
NRBC BLD AUTO-RTO: 0 /100 WBCS
PLATELET # BLD AUTO: 307 THOUSANDS/UL (ref 149–390)
PLATELET BLD QL SMEAR: ADEQUATE
PMV BLD AUTO: 9.5 FL (ref 8.9–12.7)
POTASSIUM SERPL-SCNC: 3.8 MMOL/L (ref 3.5–5.3)
PROT SERPL-MCNC: 7.3 G/DL (ref 6.4–8.2)
RBC # BLD AUTO: 4.06 MILLION/UL (ref 3.81–5.12)
RBC MORPH BLD: NORMAL
SODIUM SERPL-SCNC: 141 MMOL/L (ref 136–145)
TOTAL CELLS COUNTED SPEC: 100
WBC # BLD AUTO: 4.72 THOUSAND/UL (ref 4.31–10.16)

## 2020-04-05 PROCEDURE — 99232 SBSQ HOSP IP/OBS MODERATE 35: CPT | Performed by: FAMILY MEDICINE

## 2020-04-05 PROCEDURE — 80053 COMPREHEN METABOLIC PANEL: CPT | Performed by: FAMILY MEDICINE

## 2020-04-05 PROCEDURE — 85007 BL SMEAR W/DIFF WBC COUNT: CPT | Performed by: FAMILY MEDICINE

## 2020-04-05 PROCEDURE — 85027 COMPLETE CBC AUTOMATED: CPT | Performed by: FAMILY MEDICINE

## 2020-04-05 RX ADMIN — Medication 1 TABLET: at 09:28

## 2020-04-05 RX ADMIN — PAROXETINE 40 MG: 20 TABLET, FILM COATED ORAL at 09:28

## 2020-04-05 RX ADMIN — STANDARDIZED SENNA CONCENTRATE 8.6 MG: 8.6 TABLET ORAL at 09:28

## 2020-04-05 RX ADMIN — HYDROCHLOROTHIAZIDE 25 MG: 25 TABLET ORAL at 09:28

## 2020-04-05 RX ADMIN — HYDROXYCHLOROQUINE SULFATE 200 MG: 200 TABLET, FILM COATED ORAL at 09:28

## 2020-04-05 RX ADMIN — Medication 1 TABLET: at 16:38

## 2020-04-05 RX ADMIN — ENOXAPARIN SODIUM 40 MG: 40 INJECTION SUBCUTANEOUS at 09:29

## 2020-04-05 RX ADMIN — DOCUSATE SODIUM 100 MG: 100 CAPSULE, LIQUID FILLED ORAL at 09:28

## 2020-04-05 RX ADMIN — DOCUSATE SODIUM 100 MG: 100 CAPSULE, LIQUID FILLED ORAL at 18:31

## 2020-04-05 NOTE — PROGRESS NOTES
Progress Note - Mickey Busby 1960, 63 y o  female MRN: [id number]    Unit/Bed#: -01 Encounter: [id number]    Primary Care Provider: Carmina Sierra MD   Date and time admitted to hospital: 3/31/2020 12:46 PM        Acute respiratory failure with hypoxia [facility])  Assessment & Plan  Secondary to COVID  Patient is on Plaquenil treatment  QT interval is 344 as per telemetry  As per patient, she is feeling much better compared to yesterday    * Acute respiratory disease due to COVID-19 virus  Assessment & Plan  Clinically doing well  Patient already on Plaquenil-continue  ID consult appreciated  Patient is requiring 3 L of oxygen to maintain good saturation  Continue monitor  QTc in Telemetry is 359    Essential hypertension  Assessment & Plan  Controlled  Patient takes lisinopril-hydrochlorothiazide at home  Since patient under investigation for COVID- we will hold Lisinopril now  Continue hydrochlorothiazide    Hypokalemia  Assessment & Plan  Resolved with supplement    Other hyperlipidemia  Assessment & Plan  Since patient has COVID-we will Hold Pravastatin now      VTE Pharmacologic Prophylaxis:   Pharmacologic: Enoxaparin (Lovenox)  Mechanical VTE Prophylaxis in Place: Yes    Patient Centered Rounds: I have performed bedside rounds with nursing staff today  Time Spent for Care: 30 minutes  More than 50% of total time spent on counseling and coordination of care as described above  Current Length of Stay: 4 day(s)    Current Patient Status: Inpatient   Certification Statement: The patient will continue to require additional inpatient hospital stay due to COVID, respiratory failure    Discharge Plan / Estimated Discharge Date: To be determined      Code Status: Level 1 - Full Code      Subjective:   Seen and evaluated during the round  Patient reports she feels much better  Still having mild short of breath but is improved    Denies any nausea, vomiting, blurry vision, any photosensitivity, any rash, joint pain, nausea, vomiting, diarrhea  Objective:     Vitals:   Temp (24hrs), Av 3 °F (36 8 °C), Min:98 °F (36 7 °C), Max:98 6 °F (37 °C)    Temp:  [98 °F (36 7 °C)-98 6 °F (37 °C)] 98 3 °F (36 8 °C)  HR:  [74-94] 82  Resp:  [18-26] 19  BP: (113-133)/(62-73) 116/69  SpO2:  [94 %-97 %] 96 %  Body mass index is 29 18 kg/m²  Input and Output Summary (last 24 hours): Intake/Output Summary (Last 24 hours) at 2020 1140  Last data filed at 2020 1027  Gross per 24 hour   Intake 730 ml   Output 350 ml   Net 380 ml       Physical Exam:     Physical Exam   Constitutional: She is oriented to person, place, and time  She appears well-developed  No distress  HENT:   Mouth/Throat: Oropharynx is clear and moist  No oropharyngeal exudate  Eyes: Pupils are equal, round, and reactive to light  Conjunctivae and EOM are normal  No scleral icterus  Neck: Normal range of motion  No JVD present  Cardiovascular: Normal rate  Exam reveals no gallop and no friction rub  No murmur heard  Pulmonary/Chest: She has rales (minimal basilar)  Abdominal: Soft  Bowel sounds are normal  She exhibits no distension and no mass  There is no tenderness  There is no guarding  Musculoskeletal: Normal range of motion  She exhibits no edema  Neurological: She is alert and oriented to person, place, and time  She displays normal reflexes  No cranial nerve deficit  She exhibits normal muscle tone  Coordination normal    Skin: Skin is warm  Capillary refill takes less than 2 seconds  Psychiatric: She has a normal mood and affect  Nursing note and vitals reviewed        Additional Data:     Labs:    Results from last 7 days   Lab Units 20  0511  20  1341   WBC Thousand/uL 4 72   < > 3 18*   HEMOGLOBIN g/dL 12 9   < > 13 0   HEMATOCRIT % 38 3   < > 38 1   PLATELETS Thousands/uL 307   < > 206   NEUTROS PCT %  --   --  66   LYMPHS PCT %  --   --  26   LYMPHO PCT % 35   < >  --    MONOS PCT %  --   -- 7   MONO PCT % 10   < >  --    EOS PCT % 3   < > 0    < > = values in this interval not displayed  Results from last 7 days   Lab Units 04/05/20  0511   POTASSIUM mmol/L 3 8   CHLORIDE mmol/L 101   CO2 mmol/L 32   BUN mg/dL 12   CREATININE mg/dL 0 84   CALCIUM mg/dL 8 9   ALK PHOS U/L 65   ALT U/L 37   AST U/L 26           * I Have Reviewed All Lab Data Listed Above  * Additional Pertinent Lab Tests Reviewed: All Labs Within Last 24 Hours Reviewed      Recent Cultures (last 7 days):     Results from last 7 days   Lab Units 04/01/20  1110   LEGIONELLA URINARY ANTIGEN  Negative       Last 24 Hours Medication List:     Current Facility-Administered Medications:  albuterol 2 puff Inhalation Q4H PRN Paradise Her MD   aluminum-magnesium hydroxide-simethicone 30 mL Oral Q6H PRN Taylor Her MD   calcium carbonate 1,000 mg Oral Daily PRN Yesy Coley MD   calcium carbonate-vitamin D 1 tablet Oral BID With Meals Taylor Her MD   docusate sodium 100 mg Oral BID Yesy Coley MD   enoxaparin 40 mg Subcutaneous Daily Yesy Coley MD   hydrochlorothiazide 25 mg Oral Daily Yesy Coley MD   PARoxetine 40 mg Oral QAM Yesy Coley MD   senna 1 tablet Oral Daily Yesy Coley MD        Today, Patient Was Seen By: Yesy Coley MD    ** Please Note: Dragon 360 Dictation voice to text software may have been used in the creation of this document   **

## 2020-04-05 NOTE — ASSESSMENT & PLAN NOTE
Clinically doing well  Patient already on Plaquenil-continue  ID consult appreciated  Patient is requiring 3 L of oxygen to maintain good saturation  Continue monitor  QTc in Telemetry is 359

## 2020-04-05 NOTE — PLAN OF CARE
Problem: PAIN - ADULT  Goal: Verbalizes/displays adequate comfort level or baseline comfort level  Description  Interventions:  - Encourage patient to monitor pain and request assistance  - Assess pain using appropriate pain scale  - Administer analgesics based on type and severity of pain and evaluate response  - Implement non-pharmacological measures as appropriate and evaluate response  - Consider cultural and social influences on pain and pain management  - Notify physician/advanced practitioner if interventions unsuccessful or patient reports new pain  Outcome: Progressing     Problem: INFECTION - ADULT  Goal: Absence or prevention of progression during hospitalization  Description  INTERVENTIONS:  - Assess and monitor for signs and symptoms of infection  - Monitor lab/diagnostic results  - Monitor all insertion sites,   - Monitor endotracheal if appropriate and nasal secretions for changes in amount and color  - [place] appropriate cooling/warming therapies per order  - Administer medications as ordered  - Instruct and encourage patient and family to use good hand hygiene technique  - Identify and instruct in appropriate isolation precautions for identified infection/condition   Outcome: Progressing  Goal: Absence of fever/infection during neutropenic period  Description  INTERVENTIONS:  - Monitor WBC    Outcome: Progressing     Problem: SAFETY ADULT  Goal: Patient will remain free of falls  Description  INTERVENTIONS:  - Assess patient frequently for physical needs  -  Identify cognitive and physical deficits and behaviors that affect risk of falls    -  [place] fall precautions as indicated by assessment   - Educate patient/family on patient safety including physical limitations  - Instruct patient to call for assistance with activity based on assessment  - Modify environment to reduce risk of injury  - Consider OT/PT consult to assist with strengthening/mobility  Outcome: Progressing  Goal: Maintain or return to baseline ADL function  Description  INTERVENTIONS:  -  Assess patient's ability to carry out ADLs; assess patient's baseline for ADL function and identify physical deficits which impact ability to perform ADLs (bathing, care of mouth/teeth, toileting, grooming, dressing, etc )  - Assess/evaluate cause of self-care deficits   - Assess range of motion  - Assess patient's mobility; develop plan if impaired  - Assess patient's need for assistive devices and provide as appropriate  - Encourage maximum independence but intervene and supervise when necessary  - Involve family in performance of ADLs  - Assess for home care needs following discharge   - Consider OT consult to assist with ADL evaluation and planning for discharge  - Provide patient education as appropriate  Outcome: Progressing  Goal: Maintain or return mobility status to optimal level  Description  INTERVENTIONS:  - Assess patient's baseline mobility status (ambulation, transfers, stairs, etc )    - Identify cognitive and physical deficits and behaviors that affect mobility  - Identify mobility aids required to assist with transfers and/or ambulation (gait belt, sit-to-stand, lift, walker, cane, etc )  - [place] fall precautions as indicated by assessment  - Record patient progress and toleration of activity level on Mobility SBAR; progress patient to next Phase/Stage  - Instruct patient to call for assistance with activity based on assessment  - Consider rehabilitation consult to assist with strengthening/weightbearing, etc   Outcome: Progressing     Problem: DISCHARGE PLANNING  Goal: Discharge to home or other facility with appropriate resources  Description  INTERVENTIONS:  - Identify barriers to discharge w/patient and caregiver  - Arrange for needed discharge resources and transportation as appropriate  - Identify discharge learning needs (meds, wound care, etc )  - Arrange for interpretive services to assist at discharge as needed  - Refer to Case Management Department for coordinating discharge planning if the patient needs post-hospital services based on physician/advanced practitioner order or complex needs related to functional status, cognitive ability, or social support system  Outcome: Progressing     Problem: Knowledge Deficit  Goal: Patient/family/caregiver demonstrates understanding of disease process, treatment plan, medications, and discharge instructions  Description  Complete learning assessment and assess knowledge base  Interventions:  - Provide teaching at level of understanding  - Provide teaching via preferred learning methods  Outcome: Progressing     Problem: Potential for Falls  Goal: Patient will remain free of falls  Description  INTERVENTIONS:  - Assess patient frequently for physical needs  -  Identify cognitive and physical deficits and behaviors that affect risk of falls  -  [place] fall precautions as indicated by assessment   - Educate patient/family on patient safety including physical limitations  - Instruct patient to call for assistance with activity based on assessment  - Modify environment to reduce risk of injury  - Consider OT/PT consult to assist with strengthening/mobility  Outcome: Progressing     Problem: Nutrition/Hydration-ADULT  Goal: Nutrient/Hydration intake appropriate for improving, restoring or maintaining nutritional needs  Description  Monitor and assess patient's nutrition/hydration status for malnutrition  Collaborate with interdisciplinary team and initiate plan and interventions as ordered  Monitor patient's weight and dietary intake as ordered or per policy  Utilize nutrition screening tool and intervene as necessary  Determine patient's food preferences and provide high-protein, high-caloric foods as appropriate       INTERVENTIONS:  - Monitor oral intake, urinary output, labs, and treatment plans  - Assess nutrition and hydration status and recommend course of action  - Evaluate amount of meals eaten  - Assist patient with eating if necessary   - Allow adequate time for meals  - Recommend/ encourage appropriate diets, oral nutritional supplements, and vitamin/mineral supplements  - Order, calculate, and assess calorie counts as needed  - Recommend, monitor, and adjust tube feedings and TPN/PPN based on assessed needs  - Assess need for intravenous fluids  - Provide specific nutrition/hydration education as appropriate  - Include patient/family/caregiver in decisions related to nutrition  Outcome: Progressing     Problem: RESPIRATORY - ADULT  Goal: Achieves optimal ventilation and oxygenation  Description  INTERVENTIONS:  - Assess for changes in respiratory status  - Assess for changes in mentation and behavior  - Position to facilitate oxygenation and minimize respiratory effort  - Oxygen administered by appropriate delivery if ordered  - Initiate smoking cessation education as indicated  - Encourage broncho-pulmonary hygiene including cough, deep breathe, Incentive Spirometry  - Assess the need for suctioning and aspirate as needed  - Assess and instruct to report SOB or any respiratory difficulty  - Respiratory Therapy support as indicated  Outcome: Progressing

## 2020-04-06 VITALS
DIASTOLIC BLOOD PRESSURE: 81 MMHG | HEIGHT: 64 IN | OXYGEN SATURATION: 90 % | TEMPERATURE: 97.4 F | SYSTOLIC BLOOD PRESSURE: 129 MMHG | RESPIRATION RATE: 28 BRPM | BODY MASS INDEX: 29.02 KG/M2 | WEIGHT: 169.97 LBS | HEART RATE: 94 BPM

## 2020-04-06 LAB
ANION GAP SERPL CALCULATED.3IONS-SCNC: 8 MMOL/L (ref 4–13)
BUN SERPL-MCNC: 14 MG/DL (ref 5–25)
CALCIUM SERPL-MCNC: 8.7 MG/DL (ref 8.3–10.1)
CHLORIDE SERPL-SCNC: 100 MMOL/L (ref 100–108)
CO2 SERPL-SCNC: 32 MMOL/L (ref 21–32)
CREAT SERPL-MCNC: 0.79 MG/DL (ref 0.6–1.3)
GFR SERPL CREATININE-BSD FRML MDRD: 81 ML/MIN/1.73SQ M
GLUCOSE SERPL-MCNC: 99 MG/DL (ref 65–140)
POTASSIUM SERPL-SCNC: 3.3 MMOL/L (ref 3.5–5.3)
SODIUM SERPL-SCNC: 140 MMOL/L (ref 136–145)

## 2020-04-06 PROCEDURE — 80048 BASIC METABOLIC PNL TOTAL CA: CPT | Performed by: FAMILY MEDICINE

## 2020-04-06 PROCEDURE — 93005 ELECTROCARDIOGRAM TRACING: CPT

## 2020-04-06 PROCEDURE — 99239 HOSP IP/OBS DSCHRG MGMT >30: CPT | Performed by: FAMILY MEDICINE

## 2020-04-06 RX ORDER — ALBUTEROL SULFATE 90 UG/1
2 AEROSOL, METERED RESPIRATORY (INHALATION) EVERY 4 HOURS PRN
Qty: 1 INHALER | Refills: 1 | Status: SHIPPED | OUTPATIENT
Start: 2020-04-06

## 2020-04-06 RX ADMIN — HYDROCHLOROTHIAZIDE 25 MG: 25 TABLET ORAL at 08:51

## 2020-04-06 RX ADMIN — Medication 1 TABLET: at 08:52

## 2020-04-06 RX ADMIN — ENOXAPARIN SODIUM 40 MG: 40 INJECTION SUBCUTANEOUS at 08:52

## 2020-04-06 RX ADMIN — DOCUSATE SODIUM 100 MG: 100 CAPSULE, LIQUID FILLED ORAL at 08:51

## 2020-04-06 RX ADMIN — STANDARDIZED SENNA CONCENTRATE 8.6 MG: 8.6 TABLET ORAL at 08:51

## 2020-04-06 RX ADMIN — PAROXETINE 40 MG: 20 TABLET, FILM COATED ORAL at 08:51

## 2020-04-06 NOTE — ASSESSMENT & PLAN NOTE
Patient's condition is stable, saturating well in room air, denies any significant complaint  Patient completed treatment with Plaquenil  ID consult appreciated  QTc in Telemetry is 359

## 2020-04-06 NOTE — CASE MANAGEMENT
Discussed in rounds plan is to dc today  CM called and spoke to patient in the room she is feeling much better  Reviewed DC IMM with her, she verbalized she has no intentions to appealing her dc  Copy placed in bin to be scanned in and will provide copy for patient when the nurse goes into the room next  Encouraged patient to follow up with PCP

## 2020-04-06 NOTE — NURSING NOTE
Pt given discharge and Covid-19 paperwork and education  Pt masked and transported to POV via wheelchair

## 2020-04-06 NOTE — ASSESSMENT & PLAN NOTE
Secondary to COVID  Patient completed  Plaquenil treatment as per protocol  QT interval is 344 as per telemetry  Plan is to discharge patient home with precaution

## 2020-04-06 NOTE — DISCHARGE SUMMARY
Discharge- Ronnie Naylor 1960, 63 y o  female MRN: [id number]    Unit/Bed#: -01 Encounter: [id number]    Primary Care Provider: Yassine Espinal MD   Date and time admitted to hospital: 3/31/2020 12:46 PM        Acute respiratory failure with hypoxia [facility])  Assessment & Plan  Secondary to COVID  Patient completed  Plaquenil treatment as per protocol  QT interval is 344 as per telemetry  Plan is to discharge patient home with precaution     * Acute respiratory disease due to COVID-19 virus  Assessment & Plan  Patient's condition is stable, saturating well in room air, denies any significant complaint  Patient completed treatment with Plaquenil  ID consult appreciated  QTc in Telemetry is 359    Essential hypertension  Assessment & Plan  Controlled  Patient takes lisinopril-hydrochlorothiazide at home  Since patient under investigation for COVID- we will hold Lisinopril now  Continue hydrochlorothiazide    Hypokalemia  Assessment & Plan  Potassium is 3 3  Will discharge patient with supplement    Other hyperlipidemia  Assessment & Plan  Since patient has COVID-we will Hold Pravastatin now      Discharging Physician / Practitioner: Stephon William MD  PCP: Yassine Espinal MD  Admission Date:   Admission Orders (From admission, onward)     Ordered        04/01/20 1102  Inpatient Admission  Once         03/31/20 1507  Place in Observation  Once                   Discharge Date: 04/06/20        Consultations During Hospital Stay:  · ID    Procedures Performed:   · Chest x-ray:  Probable faint peripheral infiltrates at both lungs    Significant Findings / Test Results:   · Chest x-ray:  Probable faint peripheral infiltrates at both lung  Legionella antigen, urine [[id number]] (Normal) Collected: 04/01/20 1110   Lab Status: Final result Specimen: Urine, Clean Catch Updated: 04/01/20 1518    Legionella Urinary Antigen Negative   Strep Pneumoniae, Urine [[id number]] (Normal) Collected: 04/01/20 1109   Lab Status: Final result Specimen: Urine, Clean Catch Updated: 04/01/20 1518    Strep pneumoniae antigen, urine Negative   Influenza A/B and RSV PCR [[id number]] (Normal) Collected: 03/31/20 1742   Lab Status: Final result Specimen: Nasopharyngeal Swab Updated: 03/31/20 1828    INFLUENZA A PCR None Detected    INFLUENZA B PCR None Detected    RSV PCR None Detected   2019 Novel Coronavirus (COVID-19), ERAN [[id number]] (Abnormal) Collected: 03/31/20 1301   Lab Status: Final result Specimen: Nasopharyngeal Swab Updated: 04/02/20 0806    SARS-CoV-2  DetectedAbnormal      Lab Results   Component Value Date    WBC 4 72 04/05/2020    HGB 12 9 04/05/2020    HCT 38 3 04/05/2020    MCV 94 04/05/2020     04/05/2020     Lab Results   Component Value Date    SODIUM 140 04/06/2020    K 3 3 (L) 04/06/2020     04/06/2020    CO2 32 04/06/2020    AGAP 8 04/06/2020    BUN 14 04/06/2020    CREATININE 0 79 04/06/2020    GLUC 99 04/06/2020    GLUF 93 04/01/2020    CALCIUM 8 7 04/06/2020    AST 26 04/05/2020    ALT 37 04/05/2020    ALKPHOS 65 04/05/2020    TP 7 3 04/05/2020    TBILI 0 28 04/05/2020    EGFR 81 04/06/2020       Incidental Findings:   · None     Test Results Pending at Discharge (will require follow up):   · none     Outpatient Tests Requested:  · none    Complications:  None    Reason for Admission:  Shortness of breath, fatigue and tired    Hospital Course:     Ronnie Naylor is a 63 y o  female patient who originally presented to the hospital on 3/31/2020 due to shortness of breath, fatigue and tiredness  Patient admitted to the hospital due to acute respiratory failure with hypoxia later on patient diagnosed with acute respiratory disease due to COVID-19 virus  On presentation, patient was saturating 89% on room air  Patient received treatment with Plaquenil as per protocol, proper isolation protocol followed during the hospitalizations    Patient was requiring 3 L of oxygen initially to maintain good saturation  With the treatment completion, patient is saturating 93-94% on room air  Denies any short of breath, any fatigue or tiredness  Patient will be discharge to home with home care instruction  Patient is to follow-up with PCP as soon as possible  Please see above list of diagnoses and related plan for additional information  Condition at Discharge: stable     Discharge Day Visit / Exam:     Subjective:  Seen and evaluated during the round  Patient reports she feels much better  Denies any short of breath, chest pain, cough, fever, fatigue, myalgia, diarrhea, constipation, abdominal pain, blurry vision, joint pain  Denies any rash  Patient reports she is breathing quite well in the room air  Vitals: Blood Pressure: 129/81 (04/06/20 0821)  Pulse: 94 (04/06/20 0821)  Temperature: (!) 97 4 °F (36 3 °C) (04/06/20 0821)  Temp Source: Oral (04/06/20 0821)  Respirations: (!) 28 (04/06/20 0821)  Height: 5' 4" (162 6 cm) (04/01/20 0929)  Weight - Scale: 77 1 kg (169 lb 15 6 oz) (03/31/20 1250)  SpO2: 90 % (04/06/20 0821)  Exam:   Physical Exam   Constitutional: She is oriented to person, place, and time  She appears well-developed and well-nourished  She appears distressed  HENT:   Mouth/Throat: Oropharynx is clear and moist  No oropharyngeal exudate  Eyes: Pupils are equal, round, and reactive to light  Conjunctivae and EOM are normal  No scleral icterus  Neck: Normal range of motion  Neck supple  No JVD present  Cardiovascular: Normal rate  Exam reveals no gallop and no friction rub  No murmur heard  Pulmonary/Chest: Effort normal and breath sounds normal  No stridor  No respiratory distress  She has no wheezes  She has no rales  Abdominal: Soft  Bowel sounds are normal  She exhibits no distension and no mass  There is no tenderness  There is no rebound and no guarding  Musculoskeletal: Normal range of motion  She exhibits no edema or tenderness     Neurological: She is alert and oriented to person, place, and time  She displays normal reflexes  No cranial nerve deficit or sensory deficit  She exhibits normal muscle tone  Coordination normal    Skin: Skin is warm  Capillary refill takes less than 2 seconds  Psychiatric: She has a normal mood and affect  Nursing note and vitals reviewed  Discussion with Family: none    Discharge instructions/Information to patient and family:   See after visit summary for information provided to patient and family  Provisions for Follow-Up Care:  See after visit summary for information related to follow-up care and any pertinent home health orders  Disposition:     Home    For Discharges to [facility] SNF:   · Not Applicable to this Patient - Not Applicable to this Patient    Planned Readmission: if symptoms get worse     Discharge Statement:  I spent >30 minutes discharging the patient  This time was spent on the day of discharge  I had direct contact with the patient on the day of discharge  Greater than 50% of the total time was spent examining patient, answering all patient questions, arranging and discussing plan of care with patient as well as directly providing post-discharge instructions  Additional time then spent on discharge activities  Discharge Medications:  See after visit summary for reconciled discharge medications provided to patient and family        ** Please Note: This note has been constructed using a voice recognition system **

## 2020-04-06 NOTE — PLAN OF CARE
Problem: PAIN - ADULT  Goal: Verbalizes/displays adequate comfort level or baseline comfort level  Description  Interventions:  - Encourage patient to monitor pain and request assistance  - Assess pain using appropriate pain scale  - Administer analgesics based on type and severity of pain and evaluate response  - Implement non-pharmacological measures as appropriate and evaluate response  - Consider cultural and social influences on pain and pain management  - Notify physician/advanced practitioner if interventions unsuccessful or patient reports new pain  Outcome: Adequate for Discharge     Problem: INFECTION - ADULT  Goal: Absence or prevention of progression during hospitalization  Description  INTERVENTIONS:  - Assess and monitor for signs and symptoms of infection  - Monitor lab/diagnostic results  - Monitor all insertion sites,   - Monitor endotracheal if appropriate and nasal secretions for changes in amount and color  - [place] appropriate cooling/warming therapies per order  - Administer medications as ordered  - Instruct and encourage patient and family to use good hand hygiene technique  - Identify and instruct in appropriate isolation precautions for identified infection/condition   Outcome: Adequate for Discharge  Goal: Absence of fever/infection during neutropenic period  Description  INTERVENTIONS:  - Monitor WBC    Outcome: Adequate for Discharge     Problem: SAFETY ADULT  Goal: Patient will remain free of falls  Description  INTERVENTIONS:  - Assess patient frequently for physical needs  -  Identify cognitive and physical deficits and behaviors that affect risk of falls    -  [place] fall precautions as indicated by assessment   - Educate patient/family on patient safety including physical limitations  - Instruct patient to call for assistance with activity based on assessment  - Modify environment to reduce risk of injury  - Consider OT/PT consult to assist with strengthening/mobility  Outcome: Adequate for Discharge  Goal: Maintain or return to baseline ADL function  Description  INTERVENTIONS:  -  Assess patient's ability to carry out ADLs; assess patient's baseline for ADL function and identify physical deficits which impact ability to perform ADLs (bathing, care of mouth/teeth, toileting, grooming, dressing, etc )  - Assess/evaluate cause of self-care deficits   - Assess range of motion  - Assess patient's mobility; develop plan if impaired  - Assess patient's need for assistive devices and provide as appropriate  - Encourage maximum independence but intervene and supervise when necessary  - Involve family in performance of ADLs  - Assess for home care needs following discharge   - Consider OT consult to assist with ADL evaluation and planning for discharge  - Provide patient education as appropriate  Outcome: Adequate for Discharge  Goal: Maintain or return mobility status to optimal level  Description  INTERVENTIONS:  - Assess patient's baseline mobility status (ambulation, transfers, stairs, etc )    - Identify cognitive and physical deficits and behaviors that affect mobility  - Identify mobility aids required to assist with transfers and/or ambulation (gait belt, sit-to-stand, lift, walker, cane, etc )  - [place] fall precautions as indicated by assessment  - Record patient progress and toleration of activity level on Mobility SBAR; progress patient to next Phase/Stage  - Instruct patient to call for assistance with activity based on assessment  - Consider rehabilitation consult to assist with strengthening/weightbearing, etc   Outcome: Adequate for Discharge     Problem: DISCHARGE PLANNING  Goal: Discharge to home or other facility with appropriate resources  Description  INTERVENTIONS:  - Identify barriers to discharge w/patient and caregiver  - Arrange for needed discharge resources and transportation as appropriate  - Identify discharge learning needs (meds, wound care, etc )  - Arrange for interpretive services to assist at discharge as needed  - Refer to Case Management Department for coordinating discharge planning if the patient needs post-hospital services based on physician/advanced practitioner order or complex needs related to functional status, cognitive ability, or social support system  Outcome: Adequate for Discharge     Problem: Knowledge Deficit  Goal: Patient/family/caregiver demonstrates understanding of disease process, treatment plan, medications, and discharge instructions  Description  Complete learning assessment and assess knowledge base  Interventions:  - Provide teaching at level of understanding  - Provide teaching via preferred learning methods  Outcome: Adequate for Discharge     Problem: Potential for Falls  Goal: Patient will remain free of falls  Description  INTERVENTIONS:  - Assess patient frequently for physical needs  -  Identify cognitive and physical deficits and behaviors that affect risk of falls  -  [place] fall precautions as indicated by assessment   - Educate patient/family on patient safety including physical limitations  - Instruct patient to call for assistance with activity based on assessment  - Modify environment to reduce risk of injury  - Consider OT/PT consult to assist with strengthening/mobility  Outcome: Adequate for Discharge     Problem: Nutrition/Hydration-ADULT  Goal: Nutrient/Hydration intake appropriate for improving, restoring or maintaining nutritional needs  Description  Monitor and assess patient's nutrition/hydration status for malnutrition  Collaborate with interdisciplinary team and initiate plan and interventions as ordered  Monitor patient's weight and dietary intake as ordered or per policy  Utilize nutrition screening tool and intervene as necessary  Determine patient's food preferences and provide high-protein, high-caloric foods as appropriate       INTERVENTIONS:  - Monitor oral intake, urinary output, labs, and treatment plans  - Assess nutrition and hydration status and recommend course of action  - Evaluate amount of meals eaten  - Assist patient with eating if necessary   - Allow adequate time for meals  - Recommend/ encourage appropriate diets, oral nutritional supplements, and vitamin/mineral supplements  - Order, calculate, and assess calorie counts as needed  - Recommend, monitor, and adjust tube feedings and TPN/PPN based on assessed needs  - Assess need for intravenous fluids  - Provide specific nutrition/hydration education as appropriate  - Include patient/family/caregiver in decisions related to nutrition  Outcome: Adequate for Discharge     Problem: RESPIRATORY - ADULT  Goal: Achieves optimal ventilation and oxygenation  Description  INTERVENTIONS:  - Assess for changes in respiratory status  - Assess for changes in mentation and behavior  - Position to facilitate oxygenation and minimize respiratory effort  - Oxygen administered by appropriate delivery if ordered  - Initiate smoking cessation education as indicated  - Encourage broncho-pulmonary hygiene including cough, deep breathe, Incentive Spirometry  - Assess the need for suctioning and aspirate as needed  - Assess and instruct to report SOB or any respiratory difficulty  - Respiratory Therapy support as indicated  Outcome: Adequate for Discharge

## 2020-04-06 NOTE — DISCHARGE INSTRUCTIONS
[address]    Your healthcare provider and/or public health staff have evaluated you and have determined that you do not need to remain in the hospital at this time  At this time you can be isolated at home where you will be monitored by staff from your local or state health department  You should carefully follow the prevention and isolation steps below until a healthcare provider or local or state health department says that you can return to your normal activities  Stay home except to get medical care    People who are mildly ill with COVID-19 are able to isolate at home during their illness  You should restrict activities outside your home, except for getting medical care  Do not go to work, school, or public areas  Avoid using public transportation, ride-sharing, or taxis  Separate yourself from other people and animals in your home    People: As much as possible, you should stay in a specific room and away from other people in your home  Also, you should use a separate bathroom, if available  Animals: You should restrict contact with pets and other animals while you are sick with COVID-19, just like you would around other people  Although there have not been reports of pets or other animals becoming sick with COVID-19, it is still recommended that people sick with COVID-19 limit contact with animals until more information is known about the virus  When possible, have another member of your household care for your animals while you are sick  If you are sick with COVID-19, avoid contact with your pet, including petting, snuggling, being kissed or licked, and sharing food  If you must care for your pet or be around animals while you are sick, wash your hands before and after you interact with pets and wear a facemask  See COVID-19 and Animals for more information      Call ahead before visiting your doctor    If you have a medical appointment, call the healthcare provider and tell them that you have or may have COVID-19  This will help the healthcare providers office take steps to keep other people from getting infected or exposed  Wear a facemask    You should wear a facemask when you are around other people (e g , sharing a room or vehicle) or pets and before you enter a healthcare providers office  If you are not able to wear a facemask (for example, because it causes trouble breathing), then people who live with you should not stay in the same room with you, or they should wear a facemask if they enter your room  Cover your coughs and sneezes    Cover your mouth and nose with a tissue when you cough or sneeze  Throw used tissues in a lined trash can  Immediately wash your hands with soap and water for at least 20 seconds or, if soap and water are not available, clean your hands with an alcohol-based hand  that contains at least 60% alcohol  Clean your hands often    Wash your hands often with soap and water for at least 20 seconds, especially after blowing your nose, coughing, or sneezing; going to the bathroom; and before eating or preparing food  If soap and water are not readily available, use an alcohol-based hand  with at least 60% alcohol, covering all surfaces of your hands and rubbing them together until they feel dry  Soap and water are the best option if hands are visibly dirty  Avoid touching your eyes, nose, and mouth with unwashed hands  Avoid sharing personal household items    You should not share dishes, drinking glasses, cups, eating utensils, towels, or bedding with other people or pets in your home  After using these items, they should be washed thoroughly with soap and water  Clean all high-touch surfaces everyday    High touch surfaces include counters, tabletops, doorknobs, bathroom fixtures, toilets, phones, keyboards, tablets, and bedside tables  Also, clean any surfaces that may have blood, stool, or body fluids on them   Use a household cleaning spray or wipe, according to the label instructions  Labels contain instructions for safe and effective use of the cleaning product including precautions you should take when applying the product, such as wearing gloves and making sure you have good ventilation during use of the product  Monitor your symptoms    Seek prompt medical attention if your illness is worsening (e g , difficulty breathing)  Before seeking care, call your healthcare provider and tell them that you have, or are being evaluated for, COVID-19  Put on a facemask before you enter the facility  These steps will help the healthcare providers office to keep other people in the office or waiting room from getting infected or exposed  Ask your healthcare provider to call the local or state health department  Persons who are placed under active monitoring or facilitated self-monitoring should follow instructions provided by their local health department or occupational health professionals, as appropriate  If you have a medical emergency and need to call 911, notify the dispatch personnel that you have, or are being evaluated for COVID-19  If possible, put on a facemask before emergency medical services arrive  Discontinuing home isolation    Patients with confirmed COVID-19 should remain under home isolation precautions until the risk of secondary transmission to others is thought to be low  The decision to discontinue home isolation precautions should be made on a case-by-case basis, in consultation with healthcare providers and [facility] and local health departments  Source: Bob       Hospital Course:      Dolores Lim is a 63 y o  female patient who originally presented to the hospital on 3/31/2020 due to shortness of breath, fatigue and tiredness    Patient admitted to the hospital due to acute respiratory failure with hypoxia later on patient diagnosed with acute respiratory disease due to COVID-19 virus  On presentation, patient was saturating 89% on room air  Patient received treatment with Plaquenil as per protocol, proper isolation protocol followed during the hospitalizations  Patient was requiring 3 L of oxygen initially to maintain good saturation      With the treatment completion, patient is saturating 93-94% on room air  Denies any short of breath, any fatigue or tiredness      Patient will be discharge to home with home care instruction  Patient is to follow-up with PCP as soon as possible

## 2020-04-06 NOTE — PLAN OF CARE
Problem: PAIN - ADULT  Goal: Verbalizes/displays adequate comfort level or baseline comfort level  Description  Interventions:  - Encourage patient to monitor pain and request assistance  - Assess pain using appropriate pain scale  - Administer analgesics based on type and severity of pain and evaluate response  - Implement non-pharmacological measures as appropriate and evaluate response  - Consider cultural and social influences on pain and pain management  - Notify physician/advanced practitioner if interventions unsuccessful or patient reports new pain  4/6/2020 1125 by Shae Alvarenga RN  Outcome: Completed  4/6/2020 0731 by Shae Alvarenga RN  Outcome: Adequate for Discharge     Problem: INFECTION - ADULT  Goal: Absence or prevention of progression during hospitalization  Description  INTERVENTIONS:  - Assess and monitor for signs and symptoms of infection  - Monitor lab/diagnostic results  - Monitor all insertion sites,   - Monitor endotracheal if appropriate and nasal secretions for changes in amount and color  - [place] appropriate cooling/warming therapies per order  - Administer medications as ordered  - Instruct and encourage patient and family to use good hand hygiene technique  - Identify and instruct in appropriate isolation precautions for identified infection/condition   4/6/2020 1125 by Shae Alvarenga RN  Outcome: Completed  4/6/2020 0731 by Shae Alvarenga RN  Outcome: Adequate for Discharge  Goal: Absence of fever/infection during neutropenic period  Description  INTERVENTIONS:  - Monitor WBC    4/6/2020 1125 by Shae Alvarenga RN  Outcome: Completed  4/6/2020 0731 by Shae Alvarenga RN  Outcome: Adequate for Discharge     Problem: SAFETY ADULT  Goal: Patient will remain free of falls  Description  INTERVENTIONS:  - Assess patient frequently for physical needs  -  Identify cognitive and physical deficits and behaviors that affect risk of falls    -  [place] fall precautions as indicated by assessment   - Educate patient/family on patient safety including physical limitations  - Instruct patient to call for assistance with activity based on assessment  - Modify environment to reduce risk of injury  - Consider OT/PT consult to assist with strengthening/mobility  4/6/2020 1125 by Sherma Severe, RN  Outcome: Completed  4/6/2020 0731 by Sherma Severe, RN  Outcome: Adequate for Discharge  Goal: Maintain or return to baseline ADL function  Description  INTERVENTIONS:  -  Assess patient's ability to carry out ADLs; assess patient's baseline for ADL function and identify physical deficits which impact ability to perform ADLs (bathing, care of mouth/teeth, toileting, grooming, dressing, etc )  - Assess/evaluate cause of self-care deficits   - Assess range of motion  - Assess patient's mobility; develop plan if impaired  - Assess patient's need for assistive devices and provide as appropriate  - Encourage maximum independence but intervene and supervise when necessary  - Involve family in performance of ADLs  - Assess for home care needs following discharge   - Consider OT consult to assist with ADL evaluation and planning for discharge  - Provide patient education as appropriate  4/6/2020 1125 by Sherma Severe, RN  Outcome: Completed  4/6/2020 0731 by Sherma Severe, RN  Outcome: Adequate for Discharge  Goal: Maintain or return mobility status to optimal level  Description  INTERVENTIONS:  - Assess patient's baseline mobility status (ambulation, transfers, stairs, etc )    - Identify cognitive and physical deficits and behaviors that affect mobility  - Identify mobility aids required to assist with transfers and/or ambulation (gait belt, sit-to-stand, lift, walker, cane, etc )  - [place] fall precautions as indicated by assessment  - Record patient progress and toleration of activity level on Mobility SBAR; progress patient to next Phase/Stage  - Instruct patient to call for assistance with activity based on assessment  - Consider rehabilitation consult to assist with strengthening/weightbearing, etc   4/6/2020 1125 by Jono Farley RN  Outcome: Completed  4/6/2020 0731 by Jono Farley RN  Outcome: Adequate for Discharge     Problem: DISCHARGE PLANNING  Goal: Discharge to home or other facility with appropriate resources  Description  INTERVENTIONS:  - Identify barriers to discharge w/patient and caregiver  - Arrange for needed discharge resources and transportation as appropriate  - Identify discharge learning needs (meds, wound care, etc )  - Arrange for interpretive services to assist at discharge as needed  - Refer to Case Management Department for coordinating discharge planning if the patient needs post-hospital services based on physician/advanced practitioner order or complex needs related to functional status, cognitive ability, or social support system  4/6/2020 1125 by Jono Farley RN  Outcome: Completed  4/6/2020 0731 by Jono Farley RN  Outcome: Adequate for Discharge     Problem: Knowledge Deficit  Goal: Patient/family/caregiver demonstrates understanding of disease process, treatment plan, medications, and discharge instructions  Description  Complete learning assessment and assess knowledge base  Interventions:  - Provide teaching at level of understanding  - Provide teaching via preferred learning methods  4/6/2020 1125 by Jono Farley RN  Outcome: Completed  4/6/2020 0731 by Jono Farley RN  Outcome: Adequate for Discharge     Problem: Potential for Falls  Goal: Patient will remain free of falls  Description  INTERVENTIONS:  - Assess patient frequently for physical needs  -  Identify cognitive and physical deficits and behaviors that affect risk of falls    -  [place] fall precautions as indicated by assessment   - Educate patient/family on patient safety including physical limitations  - Instruct patient to call for assistance with activity based on assessment  - Modify environment to reduce risk of injury  - Consider OT/PT consult to assist with strengthening/mobility  4/6/2020 1125 by Paxton Marin RN  Outcome: Completed  4/6/2020 0731 by Paxton Marin RN  Outcome: Adequate for Discharge     Problem: Nutrition/Hydration-ADULT  Goal: Nutrient/Hydration intake appropriate for improving, restoring or maintaining nutritional needs  Description  Monitor and assess patient's nutrition/hydration status for malnutrition  Collaborate with interdisciplinary team and initiate plan and interventions as ordered  Monitor patient's weight and dietary intake as ordered or per policy  Utilize nutrition screening tool and intervene as necessary  Determine patient's food preferences and provide high-protein, high-caloric foods as appropriate       INTERVENTIONS:  - Monitor oral intake, urinary output, labs, and treatment plans  - Assess nutrition and hydration status and recommend course of action  - Evaluate amount of meals eaten  - Assist patient with eating if necessary   - Allow adequate time for meals  - Recommend/ encourage appropriate diets, oral nutritional supplements, and vitamin/mineral supplements  - Order, calculate, and assess calorie counts as needed  - Recommend, monitor, and adjust tube feedings and TPN/PPN based on assessed needs  - Assess need for intravenous fluids  - Provide specific nutrition/hydration education as appropriate  - Include patient/family/caregiver in decisions related to nutrition  4/6/2020 1125 by Paxton Marin RN  Outcome: Completed  4/6/2020 0731 by Paxton Marin RN  Outcome: Adequate for Discharge     Problem: RESPIRATORY - ADULT  Goal: Achieves optimal ventilation and oxygenation  Description  INTERVENTIONS:  - Assess for changes in respiratory status  - Assess for changes in mentation and behavior  - Position to facilitate oxygenation and minimize respiratory effort  - Oxygen administered by appropriate delivery if ordered  - Initiate smoking cessation education as indicated  - Encourage broncho-pulmonary hygiene including cough, deep breathe, Incentive Spirometry  - Assess the need for suctioning and aspirate as needed  - Assess and instruct to report SOB or any respiratory difficulty  - Respiratory Therapy support as indicated  4/6/2020 1125 by Elen Altamirano, RN  Outcome: Completed  4/6/2020 0731 by Elen Altamirano, RN  Outcome: Adequate for Discharge

## 2020-04-09 LAB
ATRIAL RATE: 97 BPM
QRS AXIS: 9 DEGREES
QRSD INTERVAL: 76 MS
QT INTERVAL: 322 MS
QTC INTERVAL: 467 MS
T WAVE AXIS: 229 DEGREES
VENTRICULAR RATE: 127 BPM

## 2020-09-30 ENCOUNTER — OFFICE VISIT (OUTPATIENT)
Dept: OBGYN CLINIC | Facility: CLINIC | Age: 60
End: 2020-09-30
Payer: COMMERCIAL

## 2020-09-30 VITALS
HEIGHT: 64 IN | TEMPERATURE: 97.3 F | SYSTOLIC BLOOD PRESSURE: 122 MMHG | WEIGHT: 163 LBS | DIASTOLIC BLOOD PRESSURE: 80 MMHG | HEART RATE: 92 BPM | BODY MASS INDEX: 27.83 KG/M2

## 2020-09-30 DIAGNOSIS — G56.03 BILATERAL CARPAL TUNNEL SYNDROME: Primary | ICD-10-CM

## 2020-09-30 PROCEDURE — 20526 THER INJECTION CARP TUNNEL: CPT | Performed by: ORTHOPAEDIC SURGERY

## 2020-09-30 PROCEDURE — 99204 OFFICE O/P NEW MOD 45 MIN: CPT | Performed by: ORTHOPAEDIC SURGERY

## 2020-09-30 RX ORDER — B-COMPLEX WITH VITAMIN C
TABLET ORAL
COMMUNITY

## 2020-09-30 RX ORDER — LIDOCAINE HYDROCHLORIDE 10 MG/ML
1 INJECTION, SOLUTION INFILTRATION; PERINEURAL
Status: COMPLETED | OUTPATIENT
Start: 2020-09-30 | End: 2020-09-30

## 2020-09-30 RX ORDER — TRIAMCINOLONE ACETONIDE 40 MG/ML
40 INJECTION, SUSPENSION INTRA-ARTICULAR; INTRAMUSCULAR
Status: COMPLETED | OUTPATIENT
Start: 2020-09-30 | End: 2020-09-30

## 2020-09-30 RX ADMIN — LIDOCAINE HYDROCHLORIDE 1 ML: 10 INJECTION, SOLUTION INFILTRATION; PERINEURAL at 17:42

## 2020-09-30 RX ADMIN — TRIAMCINOLONE ACETONIDE 40 MG: 40 INJECTION, SUSPENSION INTRA-ARTICULAR; INTRAMUSCULAR at 17:42

## 2020-09-30 NOTE — PROGRESS NOTES
ASSESSMENT/PLAN:    Diagnoses and all orders for this visit:    Bilateral carpal tunnel syndrome  -     Hand/upper extremity injection: R carpal tunnel    Other orders  -     Multiple Minerals-Vitamins (Luis-Mag-Zinc-D) TABS; Take by mouth        Treatment options were discussed with the patient  She was offered right carpal tunnel injection which she accepted  This was performed by Dr Juan Ho without difficulty  She was informed that if this injection does not seem to provide her any relief, we could discuss CTR in more detail  She may continue night splinting if she wants  We will see the patient back in 2 weeks for recheck  She was encouraged to contact the office should questions or concerns arise  If she does note more significant symptoms in her left wrist/hand at follow-up in 2 weeks, injection for the left could be considered  Return in about 2 weeks (around 10/14/2020)  _____________________________________________________  CHIEF COMPLAINT:  Chief Complaint   Patient presents with    Left Wrist - Pain, Numbness    Right Wrist - Numbness, Pain         SUBJECTIVE:  Danielle Espinoza is a 63 y o  female who presents for evaluation of bilateral carpal tunnel syndrome, right worse than left  The patient is right-hand-dominant  She reports that she experiences numbness and tingling in the 1st 4 digits of her right hand all the time but notices that it is worsened when she 1st wakes up in the morning  She states her left hand only bothers her 1st thing in the morning  She denies awakening at night secondary to pain or paresthesias  She did see her PCP for this who suggested trying night bracing  She did buy an over-the-counter wrist brace for her right side which she tried for several weeks but did not notice any change in her symptoms  In fact, she stated that was more of a nuisance than benefit  She has not been taking anything for pain on a regular basis  She denies any prior injuries or trauma  The patient is not diabetic and denies any history of thyroid disorders  PAST MEDICAL HISTORY:  Past Medical History:   Diagnosis Date    Depression     High cholesterol     Hypertension     Insomnia     Spinal stenosis        PAST SURGICAL HISTORY:  Past Surgical History:   Procedure Laterality Date     SECTION      ENDOMETRIAL ABLATION      TUBAL LIGATION         FAMILY HISTORY:  Family History   Problem Relation Age of Onset    Dementia Mother     Heart disease Father        SOCIAL HISTORY:  Social History     Tobacco Use    Smoking status: Former Smoker     Last attempt to quit: 2008     Years since quittin 0    Smokeless tobacco: Never Used   Substance Use Topics    Alcohol use: Yes     Frequency: Monthly or less    Drug use: Not Currently       MEDICATIONS:    Current Outpatient Medications:     lisinopril-hydrochlorothiazide (PRINZIDE,ZESTORETIC) 20-25 MG per tablet, Take 1 tablet by mouth daily, Disp: , Rfl:     Multiple Minerals-Vitamins (Luis-Mag-Zinc-D) TABS, Take by mouth, Disp: , Rfl:     PARoxetine (PAXIL) 40 MG tablet, Take 40 mg by mouth every morning, Disp: , Rfl:     pravastatin (PRAVACHOL) 20 mg tablet, Take 20 mg by mouth daily, Disp: , Rfl:     albuterol (PROVENTIL HFA,VENTOLIN HFA) 90 mcg/act inhaler, Inhale 2 puffs every 4 (four) hours as needed for wheezing or shortness of breath, Disp: 1 Inhaler, Rfl: 1    calcium carbonate-vitamin D (OSCAL-D) 500 mg-200 units per tablet, Take 1 tablet by mouth 2 (two) times a day with meals, Disp: , Rfl:     ALLERGIES:  No Known Allergies    Review of systems:   Constitutional: Negative for fatigue, fever or loss of apetite  HENT: Negative  Respiratory: Negative for shortness of breath, dyspnea  Cardiovascular: Negative for chest pain/tightness  Gastrointestinal: Negative for abdominal pain, N/V  Endocrine: Negative for cold/heat intolerance, unexplained weight loss/gain     Genitourinary: Negative for flank pain, dysuria, hematuria  Musculoskeletal: positive as stated in the HPI   Skin: Negative for rash  Neurological: Positive as stated in the HPI  Psychiatric/Behavioral: Negative for agitation  _____________________________________________________  PHYSICAL EXAMINATION:    Blood pressure 122/80, pulse 92, temperature (!) 97 3 °F (36 3 °C), temperature source Temporal, height 5' 4" (1 626 m), weight 73 9 kg (163 lb)  General: well developed and well nourished, alert, oriented times 3 and appears comfortable  Psychiatric: Normal  HEENT: Benign  Cardiovascular: Regular    Pulmonary: No wheezing or stridor  Abdomen: Soft, Nontender  Skin: No masses, erythema, lacerations, fluctation, ulcerations  Neurovascular: Motor and sensory exams are grossly intact  +2 Radial pulse  Limb is warm and well perfused with good color and capillary refill of the digits  MUSCULOSKELETAL EXAMINATION:    The bilateral wrist exam demonstrates skin intact, no erythema, no ecchymosis, no significant swelling or effusion  No obvious atrophy of the thenar eminence  She is able to make a tight composite fist without pain  Negative Tinel's at the median nerve bilaterally  Positive right median nerve compression test on the wrist  Negative on the left  Phalen's test elicited paresthesias in the right small finger only after about 40 seconds and was negative on the left for any paresthesias  Tinel sign over the ulnar nerve at the wrist bilaterally is negative  Tinel sign over the ulnar nerve at the elbow is negative  5/5 strength with resisted wrist and digit ROM  The remainder of the upper extremity exams, bilaterally, is benign  _____________________________________________________  STUDIES REVIEWED:  No imaging to review       PROCEDURES PERFORMED:  Hand/upper extremity injection: R carpal tunnel  Date/Time: 9/30/2020 5:42 PM  Consent given by: patient  Site marked: site marked  Timeout: Immediately prior to procedure a time out was called to verify the correct patient, procedure, equipment, support staff and site/side marked as required   Supporting Documentation  Indications: pain   Procedure Details  Condition:carpal tunnel syndrome Site: R carpal tunnel   Preparation: Patient was prepped and draped in the usual sterile fashion  Needle size: 25 G  Ultrasound guidance: no  Approach: volar  Medications administered: 1 mL lidocaine 1 %; 40 mg triamcinolone acetonide 40 mg/mL             Hattie Martin

## 2020-10-19 ENCOUNTER — OFFICE VISIT (OUTPATIENT)
Dept: OBGYN CLINIC | Facility: CLINIC | Age: 60
End: 2020-10-19
Payer: COMMERCIAL

## 2020-10-19 VITALS
BODY MASS INDEX: 28 KG/M2 | HEART RATE: 86 BPM | DIASTOLIC BLOOD PRESSURE: 82 MMHG | SYSTOLIC BLOOD PRESSURE: 130 MMHG | WEIGHT: 164 LBS | HEIGHT: 64 IN

## 2020-10-19 DIAGNOSIS — G56.01 CARPAL TUNNEL SYNDROME OF RIGHT WRIST: Primary | ICD-10-CM

## 2020-10-19 PROCEDURE — 99214 OFFICE O/P EST MOD 30 MIN: CPT | Performed by: ORTHOPAEDIC SURGERY

## 2021-03-15 ENCOUNTER — OFFICE VISIT (OUTPATIENT)
Dept: OBGYN CLINIC | Facility: CLINIC | Age: 61
End: 2021-03-15
Payer: COMMERCIAL

## 2021-03-15 VITALS — HEIGHT: 64 IN | WEIGHT: 164 LBS | BODY MASS INDEX: 28 KG/M2 | TEMPERATURE: 97.3 F

## 2021-03-15 DIAGNOSIS — G56.03 BILATERAL CARPAL TUNNEL SYNDROME: Primary | ICD-10-CM

## 2021-03-15 PROCEDURE — 99213 OFFICE O/P EST LOW 20 MIN: CPT | Performed by: ORTHOPAEDIC SURGERY

## 2021-03-15 NOTE — PROGRESS NOTES
ASSESSMENT/PLAN:    Diagnoses and all orders for this visit:    Bilateral carpal tunnel syndrome  -     EMG 2 Limb Upper Extremity; Future        Plan:  I discussed the need to proceed with the EMG, including both upper extremities at this time as have left-sided symptoms now are similar to the contralateral right side  I will see her once the EMG has been completed to discuss the findings  She was instructed to contact the office if she has not heard from the scheduling office about her EMG within the next 10 days to 2 weeks  Return for After EMG / NCV completed  _____________________________________________________  CHIEF COMPLAINT:  Chief Complaint   Patient presents with    Left Wrist - Follow-up    Right Wrist - Follow-up         SUBJECTIVE:  Vero Lua is a 57y o  year old female who presents for follow up   Of her bilateral carpal tunnel syndrome  She was last seen 5 months ago and was scheduled for a right-sided EMG  She states that she will had not heard from anyone and just figured it was taking some time  However, she has seen worsening of the symptoms involving her left upper extremity  It is now essentially equal in severity to the contralateral right  She feels constant numbness or tingling in her hands  She notes the numbness or tingling seems to be worse when she awakens in the morning but it does not awaken her from the sleep during the night  She has tried using carpal tunnel gloves and these do seem to provide some minimal benefit        PAST MEDICAL HISTORY:  Past Medical History:   Diagnosis Date    Depression     High cholesterol     Hypertension     Insomnia     Spinal stenosis        PAST SURGICAL HISTORY:  Past Surgical History:   Procedure Laterality Date     SECTION      ENDOMETRIAL ABLATION      TUBAL LIGATION         FAMILY HISTORY:  Family History   Problem Relation Age of Onset    Dementia Mother     Heart disease Father        SOCIAL HISTORY:  Social History     Tobacco Use    Smoking status: Former Smoker     Quit date: 2008     Years since quittin 4    Smokeless tobacco: Never Used   Substance Use Topics    Alcohol use: Yes     Frequency: Monthly or less    Drug use: Not Currently       MEDICATIONS:    Current Outpatient Medications:     albuterol (PROVENTIL HFA,VENTOLIN HFA) 90 mcg/act inhaler, Inhale 2 puffs every 4 (four) hours as needed for wheezing or shortness of breath, Disp: 1 Inhaler, Rfl: 1    lisinopril-hydrochlorothiazide (PRINZIDE,ZESTORETIC) 20-25 MG per tablet, Take 1 tablet by mouth daily, Disp: , Rfl:     Multiple Minerals-Vitamins (Luis-Mag-Zinc-D) TABS, Take by mouth, Disp: , Rfl:     PARoxetine (PAXIL) 40 MG tablet, Take 40 mg by mouth every morning, Disp: , Rfl:     pravastatin (PRAVACHOL) 20 mg tablet, Take 20 mg by mouth daily, Disp: , Rfl:     ALLERGIES:  No Known Allergies    REVIEW OF SYSTEMS:  Pertinent items are noted in HPI  A comprehensive review of systems was negative       _____________________________________________________  PHYSICAL EXAMINATION:  General: well developed and well nourished, alert, oriented times 3 and appears comfortable  Psychiatric: Normal  HEENT:  Normocephalic, atraumatic  Cardiovascular:  Regular  Pulmonary: No wheezing or stridor  Skin: No masses, erthema, lacerations, fluctation, ulcerations  Neurovascular: Motor and sensory exams are grossly intact, including median nerve distribution  Pulses are palpable in good color and capillary refill is noted  MUSCULOSKELETAL EXAMINATION:      The bilateral hand exam demonstrates no obvious thenar atrophy  She has good strength of thumb adduction  Sensation is grossly intact including median, ulnar and radial nerve distributions  Tinel's and Phalen's signs are negative  Carpal compression elicits complaints of increasing paresthesias in the median nerve distribution of both hands    The remainder of the upper extremity examination bilaterally is benign              Deepthi Sieving

## 2021-05-19 ENCOUNTER — PROCEDURE VISIT (OUTPATIENT)
Dept: NEUROLOGY | Facility: CLINIC | Age: 61
End: 2021-05-19
Payer: COMMERCIAL

## 2021-05-19 DIAGNOSIS — G56.03 BILATERAL CARPAL TUNNEL SYNDROME: ICD-10-CM

## 2021-05-19 PROCEDURE — 95911 NRV CNDJ TEST 9-10 STUDIES: CPT | Performed by: PHYSICAL MEDICINE & REHABILITATION

## 2021-05-19 PROCEDURE — 95886 MUSC TEST DONE W/N TEST COMP: CPT | Performed by: PHYSICAL MEDICINE & REHABILITATION

## 2021-05-19 NOTE — PROGRESS NOTES
EMG 2 Limb Upper Extremity     Date/Time 5/19/2021 12:57 PM     Performed by  Shanta Perkins MD     Authorized by Denise Montano Protocol   Consent: Verbal consent obtained  Risks and benefits: risks, benefits and alternatives were discussed  Consent given by: patient  Patient understanding: patient states understanding of the procedure being performed  Patient consent: the patient's understanding of the procedure matches consent given               EMG REPORT      57-year-old right-handed female presents with complaints of constant tingling and numbness in both hands affecting all fingers for the last several months  She denies any radicular symptoms  Patient is being evaluated for focal neuropathy  On physical examination, motor strength is 5/5  Sensations are diminished to light touch and pinprick in the bilateral median nerve distribution  No obvious thenar atrophy  bilaterally  The left median motor terminal latency was prolonged at 4 6 milliseconds with normal compound motor action potential amplitude and normal conduction velocity across the wrist   The right median motor terminal latency was prolonged at 7 2 milliseconds with normal compound motor action potential amplitude and a slow conduction velocity of 48 m/sec across the wrist   The bilateral ulnar motor conduction velocities and compound muscle action potentials were normal with normal distal latencies across the wrists  The left  median sensory peak  latency was prolonged at 5 8 milliseconds with a low sensory action potential amplitude of 9 microvolt  The right median  sensory peak latency was prolonged at 9 0 milliseconds with a low sensory action potential amplitude of  0 5 microvolt  The bilateral radial and ulnar sensory conduction velocity and sensory action potentials were also normal with normal distal latencies across the wrists       The left median and   bilateral ulnar F wave latencies were within normal limits  The right median F-wave latency was prolonged at 33 0 milliseconds  Concentric needle EMG  Was performed medius proximal and distal muscles of the bilateral upper extremities including deltoid, biceps, triceps, pronator teres, abductor pollicis brevis, FDI and low cervical paraspinals  3+ positive sharp waves and fibrillation potentials were noted in the right abductor pollicis brevis  Moderate decreased recruitment of giant motor units was noted in the bilateral abductor pollicis brevis     Early recruited motor units appear normal   with recruitment patterns being full or full for effort in the remaining muscles tested  INTERPRETATION: There  is electrophysiologic evidence of a:    1  Severe median nerve compression neuropathy at the wrist on the right with demyelinative changes, consistent with a diagnosis of carpal tunnel syndrome  2   Moderate median nerve compression neuropathy at the wrist on the left with demyelinative changes, consistent with a diagnosis of carpal tunnel syndrome  3   There is no evidence of a cervical radiculopathy or ulnar neuropathy bilaterally  Clinical correlation is recommended       LISA Wahl

## 2021-07-14 ENCOUNTER — OFFICE VISIT (OUTPATIENT)
Dept: OBGYN CLINIC | Facility: CLINIC | Age: 61
End: 2021-07-14
Payer: COMMERCIAL

## 2021-07-14 VITALS
DIASTOLIC BLOOD PRESSURE: 78 MMHG | TEMPERATURE: 96.4 F | SYSTOLIC BLOOD PRESSURE: 122 MMHG | HEIGHT: 64 IN | HEART RATE: 87 BPM | WEIGHT: 164 LBS | BODY MASS INDEX: 28 KG/M2

## 2021-07-14 DIAGNOSIS — G56.03 BILATERAL CARPAL TUNNEL SYNDROME: Primary | ICD-10-CM

## 2021-07-14 PROCEDURE — 99214 OFFICE O/P EST MOD 30 MIN: CPT | Performed by: ORTHOPAEDIC SURGERY

## 2021-07-14 RX ORDER — OMEGA-3/DHA/EPA/FISH OIL 60 MG-90MG
2 CAPSULE ORAL DAILY
COMMUNITY

## 2021-07-14 NOTE — PROGRESS NOTES
ASSESSMENT/PLAN:    Diagnoses and all orders for this visit:    Bilateral carpal tunnel syndrome  -     Case request operating room: RELEASE CARPAL TUNNEL; Standing  -     Case request operating room: RELEASE CARPAL TUNNEL    Other orders  -     Omega-3 Fatty Acids (Fish Oil) 500 MG CAPS; Take 2 capsules by mouth daily  -     aspirin (ECOTRIN) 325 mg EC tablet; Take 325 mg by mouth daily  -     Void on call to OR; Standing        Plan:  The risks, benefits, options and alternatives of treatment were discussed, including but not limited to: Infection, blood loss, DVT/PE, poor wound healing, loss of motion, nerve/blood vessel damage, persistent/recurrent symptoms, and palmar pain  After thorough discussion, she wishes to proceed with surgery, proceeding with surgery on the right side initially  She would like to wait until the midportion of August for surgery due to an upcoming vacation  She will be seen 1 week postoperatively  I have encouraged her to contact me if questions or concerns arise  The surgery will be performed under a local anesthetic and, therefore, she needs no preoperative testing and does not need to fast prior to surgery  I have recommended that she discontinue the aspirin she is currently taking 1 week prior to surgery  Return for One week postoperatively  _____________________________________________________  CHIEF COMPLAINT:  Chief Complaint   Patient presents with    Left Hand - Numbness, Follow-up    Right Hand - Numbness, Follow-up         SUBJECTIVE:  Carol Ortiz is a 57y o  year old female who presents for follow up of her bilateral carpal tunnel syndrome  She was initially seen several months ago, an EMG was obtained 2 months ago and she now presents for re-evaluation and discussion of treatment options  She continues to experience pain and paresthesias within the bilateral hands, right greater than left    She does awaken at night for sleep but she believes this is more due to cramping in her fingers that is to paresthesias  PAST MEDICAL HISTORY:  Past Medical History:   Diagnosis Date    Depression     High cholesterol     Hypertension     Insomnia     Spinal stenosis        PAST SURGICAL HISTORY:  Past Surgical History:   Procedure Laterality Date     SECTION      ENDOMETRIAL ABLATION      TUBAL LIGATION         FAMILY HISTORY:  Family History   Problem Relation Age of Onset    Dementia Mother     Heart disease Father        SOCIAL HISTORY:  Social History     Tobacco Use    Smoking status: Former Smoker     Quit date: 2008     Years since quittin 7    Smokeless tobacco: Never Used   Vaping Use    Vaping Use: Never used   Substance Use Topics    Alcohol use: Yes     Comment: occasional    Drug use: Not Currently       MEDICATIONS:    Current Outpatient Medications:     albuterol (PROVENTIL HFA,VENTOLIN HFA) 90 mcg/act inhaler, Inhale 2 puffs every 4 (four) hours as needed for wheezing or shortness of breath, Disp: 1 Inhaler, Rfl: 1    aspirin (ECOTRIN) 325 mg EC tablet, Take 325 mg by mouth daily, Disp: , Rfl:     lisinopril-hydrochlorothiazide (PRINZIDE,ZESTORETIC) 20-25 MG per tablet, Take 1 tablet by mouth daily, Disp: , Rfl:     Multiple Minerals-Vitamins (Luis-Mag-Zinc-D) TABS, Take by mouth, Disp: , Rfl:     Omega-3 Fatty Acids (Fish Oil) 500 MG CAPS, Take 2 capsules by mouth daily, Disp: , Rfl:     PARoxetine (PAXIL) 40 MG tablet, Take 40 mg by mouth every morning, Disp: , Rfl:     pravastatin (PRAVACHOL) 20 mg tablet, Take 20 mg by mouth daily, Disp: , Rfl:     ALLERGIES:  No Known Allergies    REVIEW OF SYSTEMS:  Pertinent items are noted in HPI    A comprehensive review of systems was negative       _____________________________________________________  PHYSICAL EXAMINATION:  General: alert, oriented times 3 and appears comfortable  Psychiatric: Normal  HEENT:  Normocephalic, atraumatic  Cardiovascular: Regular  Pulmonary: No wheezing or stridor  Abdomen:  Soft, nontender  Skin: No masses, erthema, lacerations, fluctation, ulcerations  Neurovascular:  Sensation decreased median nerve distribution right hand versus left  Otherwise, sensation intact in the ulnar and radial nerve distributions  Pulses palpable  Good color and capillary refill noted  Good strength including adduction of the thumb  MUSCULOSKELETAL EXAMINATION:    Bilateral hand and wrist exam demonstrates the skin to be warm and dry  There is no gross deformity  She has excellent range of motion of the fingers and thumb as well as of the wrist without complaints  Tinel's, Phalen's and carpal compression tests are all negative today  She demonstrates no significant thenar atrophy and has good strength of thumb adduction  The remainder of the upper extremity exam bilaterally is benign  _____________________________________________________  STUDIES REVIEWED:  The EMG report was reviewed in the patient's office notes from the neurologist in March of 2021  This indicates findings consistent with severe right carpal tunnel syndrome and moderate left carpal tunnel syndrome  There is no evidence of cervical radiculopathy or ulnar neuropathy              Shabnam Humphrey

## 2021-07-15 ENCOUNTER — TELEPHONE (OUTPATIENT)
Dept: OBGYN CLINIC | Facility: CLINIC | Age: 61
End: 2021-07-15

## 2021-07-15 ENCOUNTER — PREP FOR PROCEDURE (OUTPATIENT)
Dept: OBGYN CLINIC | Facility: CLINIC | Age: 61
End: 2021-07-15

## 2021-07-16 NOTE — TELEPHONE ENCOUNTER
Returned patient VM  Scheduled one week post op appt with Dr Maite Webb on 8/26/21 at 10:45a  LVM with information

## 2021-08-16 NOTE — PRE-PROCEDURE INSTRUCTIONS
Pre-Surgery Instructions:   Medication Instructions    albuterol (PROVENTIL HFA,VENTOLIN HFA) 90 mcg/act inhaler Patient was instructed by Physician and understands   aspirin (ECOTRIN) 325 mg EC tablet Patient was instructed by Physician and understands   lisinopril-hydrochlorothiazide (PRINZIDE,ZESTORETIC) 20-25 MG per tablet Patient was instructed by Physician and understands   Multiple Minerals-Vitamins (Luis-Mag-Zinc-D) TABS Patient was instructed by Physician and understands   Omega-3 Fatty Acids (Fish Oil) 500 MG CAPS Patient was instructed by Physician and understands   PARoxetine (PAXIL) 40 MG tablet Patient was instructed by Physician and understands   pravastatin (PRAVACHOL) 20 mg tablet Patient was instructed by Physician and understands  Pt is having local anesthesia   Pt was instructed to take am medications

## 2021-08-19 ENCOUNTER — HOSPITAL ENCOUNTER (OUTPATIENT)
Facility: HOSPITAL | Age: 61
Setting detail: OUTPATIENT SURGERY
Discharge: HOME | End: 2021-08-19
Attending: ORTHOPAEDIC SURGERY | Admitting: ORTHOPAEDIC SURGERY
Payer: COMMERCIAL

## 2021-08-19 VITALS
WEIGHT: 164 LBS | HEART RATE: 83 BPM | DIASTOLIC BLOOD PRESSURE: 81 MMHG | SYSTOLIC BLOOD PRESSURE: 148 MMHG | HEIGHT: 64 IN | OXYGEN SATURATION: 96 % | TEMPERATURE: 98 F | RESPIRATION RATE: 18 BRPM | BODY MASS INDEX: 28 KG/M2

## 2021-08-19 DIAGNOSIS — G56.03 BILATERAL CARPAL TUNNEL SYNDROME: Primary | ICD-10-CM

## 2021-08-19 PROCEDURE — 64721 CARPAL TUNNEL SURGERY: CPT | Performed by: PHYSICIAN ASSISTANT

## 2021-08-19 PROCEDURE — 64721 CARPAL TUNNEL SURGERY: CPT | Performed by: ORTHOPAEDIC SURGERY

## 2021-08-19 PROCEDURE — NC001 PR NO CHARGE: Performed by: ORTHOPAEDIC SURGERY

## 2021-08-19 RX ORDER — OXYCODONE HYDROCHLORIDE 5 MG/1
5 TABLET ORAL EVERY 4 HOURS PRN
Status: DISCONTINUED | OUTPATIENT
Start: 2021-08-19 | End: 2021-08-19 | Stop reason: HOSPADM

## 2021-08-19 RX ORDER — GINSENG 100 MG
CAPSULE ORAL AS NEEDED
Status: DISCONTINUED | OUTPATIENT
Start: 2021-08-19 | End: 2021-08-19 | Stop reason: HOSPADM

## 2021-08-19 RX ORDER — LIDOCAINE HYDROCHLORIDE 10 MG/ML
INJECTION, SOLUTION EPIDURAL; INFILTRATION; INTRACAUDAL; PERINEURAL AS NEEDED
Status: DISCONTINUED | OUTPATIENT
Start: 2021-08-19 | End: 2021-08-19 | Stop reason: HOSPADM

## 2021-08-19 RX ORDER — MAGNESIUM HYDROXIDE 1200 MG/15ML
LIQUID ORAL AS NEEDED
Status: DISCONTINUED | OUTPATIENT
Start: 2021-08-19 | End: 2021-08-19 | Stop reason: HOSPADM

## 2021-08-19 RX ORDER — HYDROCODONE BITARTRATE AND ACETAMINOPHEN 5; 325 MG/1; MG/1
1 TABLET ORAL EVERY 6 HOURS PRN
Qty: 10 TABLET | Refills: 0 | Status: SHIPPED | OUTPATIENT
Start: 2021-08-19

## 2021-08-19 RX ORDER — ACETAMINOPHEN 325 MG/1
650 TABLET ORAL EVERY 6 HOURS PRN
Status: DISCONTINUED | OUTPATIENT
Start: 2021-08-19 | End: 2021-08-19 | Stop reason: HOSPADM

## 2021-08-19 NOTE — OP NOTE
OPERATIVE REPORT  PATIENT NAME: Nidia Tee    :  1960  MRN: [id number]  Pt Location: [facility] OR ROOM 02    SURGERY DATE: 2021    Surgeon(s) and Role:     * Olive De Oliveira - Primary  Assist:  JERILYN Hassan PA-C    Preop Diagnosis:  Bilateral carpal tunnel syndrome [G56 03]    Post-Op Diagnosis Codes:     * Bilateral carpal tunnel syndrome [G56 03]    Procedure(s) (LRB):  RELEASE CARPAL TUNNEL (Right)    Tourniquet time:  9 minutes at 250 mmHg    Estimated Blood Loss:   Minimal    Anesthesia Type:   Local utilizing 10 cc 1% plain lidocaine    Operative Indications:  Bilateral carpal tunnel syndrome [G56 03]  Azeb Paz is a 56-year-old female with bilateral carpal tunnel symptoms consistent clinically and confirmed with EMG  She failed to respond to conservative measures and elected to proceed with surgical release, right side initially as this was her more symptomatic side  Operative Findings: At the time of the procedure, the transverse carpal ligament was somewhat thickened  There is a mild degree of rebound hyperemia within the median nerve with minimal hourglass deformity  There is no significant flexor tenosynovitis  The motor branch was visualized and intact  Complications:   None    Procedure and Technique:  Azeb Paz was administered a local anesthetic consisting of 10 cc of 1% plain lidocaine in the holding area  She has taken to the operating room where well-padded tourniquet was applied to the proximal right upper extremity and then a time-out performed  The limb was elevated, exsanguinated with an Esmarch bandage and the tourniquet then inflated to 250 mmHg  An incision was made in line with the radial border of the ring finger extending from the distal carpal flexion crease distally for approximately 3 cm  Sharp dissection was carried down through the skin and subcutaneous tissues  Bleeding was controlled bipolar cautery    The soft tissues were divided down to the transverse carpal ligament which was then divided in line with the incision  Distal most portion of the ligament was divided with a pair of scissors to the palmar fat and the proximal most portion of the ligament similarly divided with a pair scissors and included a portion of the distal volar forearm fascia  The carpal tunnel was inspected  The median nerve was noted to demonstrate rebound hyperemia and an hourglass deformity  Motor branch was intact  There was no significant flexor tenosynovitis  The wound was irrigated with saline solution  The skin was approximated with 4-0 nylon  Dressings were applied consisting of bacitracin, Adaptic, gauze and Webril  The tourniquet was deflated after total tourniquet time of 10 minutes and a volar splint applied  Patient was and transferred to recovery in stable satisfactory postoperative condition     I was present for the entire procedure, A qualified resident physician was not available and A physician assistant was required during the procedure for retraction tissue handling,dissection and suturing    Patient Disposition:  [facility]    SIGNATURE: Rosette Raymundo  DATE: August 19, 2021  TIME: 10:22 AM

## 2021-08-19 NOTE — DISCHARGE INSTRUCTIONS
Discharge Instructions - Orthopedics  Ravi Barkley 57 y o  female MRN: [id number]  Unit/Bed#: [facility]    Weight Bearing Status/activity:                                           Nonweight bearing right upper extremity  You can use the right upper extremity for simple tasks including eating and/or writing  Pain:  Continue analgesics as directed  You pain medications were sent to the pharmacy on record  Dressing Instructions:   Do not remove dressing  We will remove it at your first post-op first  Keep it clean and dry until seen in clinic  Appt Instructions: If you do not have your appointment, please call the clinic at [phone] to f/u with Dr Haim Avila in 1 week  Otherwise followup as scheduled  Contact the office sooner if you experience any increased numbness/tingling in the extremities

## 2021-08-19 NOTE — H&P
H&P Exam - Orthopedics   La Sneed 57 y o  female MRN: [id number]  Unit/Bed#: [facility] Encounter: [id number]    Assessment/Plan     Assessment:  Bilateral carpal tunnel syndrome  Plan:  The risks, benefits, options and alternatives of treatment were discussed, including but not limited to: Infection, blood loss, poor wound healing, loss of motion, nerve/blood vessel damage, persistent/recurrent symptoms, palmar pain  After a thorough discussion, she elected to proceed with right carpal tunnel release  History of Present Illness   HPI:  La Sneed is a 57 y o  female who presents with signs and symptoms consistent with bilateral carpal tunnel syndrome  She has failed to respond to conservative measures  She notes a constant sensation of numbness and tingling and pain  Symptoms do not seem to interfere with sleep  Symptoms are greater on the right than the left although the left has recently become more similar to the right than in the past   An EMG was performed demonstrating severe right carpal tunnel syndrome and moderate left carpal tunnel syndrome  Review of Systems:  The patient is 10 organ system review was negative excluding as in the chief complaint above  Historical Information   Past Medical History:   Diagnosis Date    COVID-19 2020    Pt was admitted with breathing difficulties to [address] [place] [address]; Pt had a fever, loss of taste and smell      Depression     High cholesterol     Hypertension     Insomnia     Spinal stenosis      Past Surgical History:   Procedure Laterality Date    BREAST SURGERY Bilateral     Breast augmentation     SECTION      ENDOMETRIAL ABLATION      TUBAL LIGATION       Social History   Social History     Substance and Sexual Activity   Alcohol Use Yes    Comment: occasional     Social History     Substance and Sexual Activity   Drug Use Not Currently     Social History     Tobacco Use   Smoking Status Former Smoker    Quit date: 2008    Years since quittin 8   Smokeless Tobacco Never Used     Family History: non-contributory    Meds/Allergies   all medications and allergies reviewed  No Known Allergies    Objective   Vitals: Blood pressure 133/67, pulse 85, temperature 97 9 °F (36 6 °C), temperature source Oral, resp  rate 18, height 5' 4" (1 626 m), weight 74 4 kg (164 lb), SpO2 97 %  ,Body mass index is 28 15 kg/m²  No intake or output data in the 24 hours ending 21 0950    No intake/output data recorded  Invasive Devices     None                 Physical Exam:  Holden Álvarez is alert, oriented and in no distress  HEENT exam is benign  Heart regular, pulses palpable  Lungs clear  Abdomen soft, nontender  Motor and sensory exam is grossly intact for the radial and ulnar nerve distribution  She did complain of slight decreased sensation in the distal aspect of the right long finger and index finger in comparison to the contralateral left  Otherwise sensation in the median nerve distribution is intact  Good strength of thumb adduction was noted  Clavicles and ribs nontender  Ortho Exam:  Bilateral upper extremity exam demonstrates excellent range of motion  Carpal tunnel testing was negative today for both upper extremities  There is a mild thenar atrophy noted  Lab Results: The EMG report demonstrates evidence of severe carpal tunnel syndrome on the right and moderate on the left

## 2021-08-26 ENCOUNTER — OFFICE VISIT (OUTPATIENT)
Dept: OBGYN CLINIC | Facility: CLINIC | Age: 61
End: 2021-08-26

## 2021-08-26 VITALS
TEMPERATURE: 96.5 F | SYSTOLIC BLOOD PRESSURE: 124 MMHG | DIASTOLIC BLOOD PRESSURE: 78 MMHG | WEIGHT: 164 LBS | BODY MASS INDEX: 28 KG/M2 | HEART RATE: 86 BPM | HEIGHT: 64 IN

## 2021-08-26 DIAGNOSIS — G56.03 BILATERAL CARPAL TUNNEL SYNDROME: Primary | ICD-10-CM

## 2021-08-26 PROCEDURE — 99024 POSTOP FOLLOW-UP VISIT: CPT | Performed by: ORTHOPAEDIC SURGERY

## 2021-08-26 NOTE — PROGRESS NOTES
Patient Name:  Mickey Busby  MRN:  [id number]    Assessment     1  Bilateral carpal tunnel syndrome         Plan     1  I would recommend follow-up in 1 week  She is to work on a home exercise program as instructed  Her cock-up wrist braces to be worn at all times excluding for cleansing and for her home exercises  I have encouraged her to contact me if any questions or concerns arise  She may begin to slowly increase activity level but is to avoid any strenuous activity with the right hand at this time  Return in about 1 week (around 9/2/2021)  Subjective   Mickey Busby returns for follow-up of   Her bilateral carpal tunnel syndrome  The patient is 1 week(s) post  Right carpal tunnel release and returns for routine follow-up  Patient denies the preoperative pain and paresthesias she was experiencing  She does have some minor incisional pain  Objective     /78   Pulse 86   Temp (!) 96 5 °F (35 8 °C) (Temporal)   Ht 5' 4" (1 626 m)   Wt 74 4 kg (164 lb)   BMI 28 15 kg/m²       The exam demonstrates the dressings in place  These were removed without difficulty  The incision is benign with stitches in place  Distal sensation is intact  Good color and capillary refill noted  She has excellent finger and thumb range of motion with only minimal limitation of wrist range of motion  There is minimal incisional tenderness noted          Johanna Portillo

## 2021-09-02 ENCOUNTER — OFFICE VISIT (OUTPATIENT)
Dept: OBGYN CLINIC | Facility: CLINIC | Age: 61
End: 2021-09-02

## 2021-09-02 VITALS
DIASTOLIC BLOOD PRESSURE: 80 MMHG | WEIGHT: 163 LBS | TEMPERATURE: 98.2 F | HEART RATE: 88 BPM | SYSTOLIC BLOOD PRESSURE: 130 MMHG | BODY MASS INDEX: 27.83 KG/M2 | HEIGHT: 64 IN

## 2021-09-02 DIAGNOSIS — G56.03 BILATERAL CARPAL TUNNEL SYNDROME: Primary | ICD-10-CM

## 2021-09-02 PROCEDURE — 99024 POSTOP FOLLOW-UP VISIT: CPT | Performed by: ORTHOPAEDIC SURGERY

## 2021-09-02 NOTE — PROGRESS NOTES
Patient Name:  Michael Lopes  MRN:  [id number]    Assessment     1  Bilateral carpal tunnel syndrome         Plan     1  I would recommend follow-up in 2 weeks  She may begin to increase activities with the right upper extremity as tolerated now 2 weeks status post carpal tunnel release  She may begin to wean herself from the cock-up wrist brace  Return in about 2 weeks (around 9/16/2021)  Subjective   Michael Lopes returns for follow-up of   Her right carpal tunnel release  The patient is 2 week(s) post op and returns for routine follow-up  Patient Denies any complaints today  She notes resolution of the preoperative pain and paresthesias  She states that 1 of her sutures fell out this morning      Objective     /80   Pulse 88   Temp 98 2 °F (36 8 °C)   Ht 5' 4" (1 626 m)   Wt 73 9 kg (163 lb)   BMI 27 98 kg/m²      exam demonstrates the incision to be benign  The remaining sutures were removed without difficulty  She has no localized tenderness  Distal sensation is intact  She has excellent finger, thumb and wrist range of motion without complaints  Good color and capillary refill is noted and pulses are palpable        Mor Stoll

## 2021-09-15 ENCOUNTER — OFFICE VISIT (OUTPATIENT)
Dept: OBGYN CLINIC | Facility: CLINIC | Age: 61
End: 2021-09-15
Payer: COMMERCIAL

## 2021-09-15 VITALS
TEMPERATURE: 96.3 F | SYSTOLIC BLOOD PRESSURE: 118 MMHG | DIASTOLIC BLOOD PRESSURE: 78 MMHG | HEART RATE: 80 BPM | BODY MASS INDEX: 27.31 KG/M2 | HEIGHT: 64 IN | WEIGHT: 160 LBS

## 2021-09-15 DIAGNOSIS — G56.03 BILATERAL CARPAL TUNNEL SYNDROME: Primary | ICD-10-CM

## 2021-09-15 PROCEDURE — 99213 OFFICE O/P EST LOW 20 MIN: CPT | Performed by: ORTHOPAEDIC SURGERY

## 2021-09-15 RX ORDER — ACETAMINOPHEN 325 MG/1
975 TABLET ORAL ONCE
Status: CANCELLED | OUTPATIENT
Start: 2021-09-15 | End: 2021-09-15

## 2021-09-15 NOTE — PROGRESS NOTES
ASSESSMENT/PLAN:    Diagnoses and all orders for this visit:    Bilateral carpal tunnel syndrome  -     Case request operating room: RELEASE CARPAL TUNNEL; Standing  -     Case request operating room: RELEASE CARPAL TUNNEL    Other orders  -     Void on call to OR; Standing  -     acetaminophen (TYLENOL) tablet 975 mg          Plan:  The risks, benefits, options and alternatives of treatment were discussed, including but not limited to: Infection, blood loss, poor wound healing, loss of motion, nerve/blood vessel damage, persistent/recurrent symptoms   After thorough discussion, she elected to proceed with left carpal tunnel release  This will be scheduled for 09/30/2021  She will be seen 1 week postoperatively  She was encouraged to contact me if questions or concerns arise prior to surgery  Surgery will be performed under a local anesthetic and I do not believe any preoperative testing or medical clearance is necessary  Return for  1 week postoperatively  _____________________________________________________  CHIEF COMPLAINT:  Chief Complaint   Patient presents with    Right Wrist - Post-op         SUBJECTIVE:  Florencio Jeans is a 57y o  year old female who presents  For evaluation of her left carpal tunnel  She is now about 1 month status post right carpal tunnel release and is doing well  Her left hand and ring finger symptoms persist with paresthesias and pain  She does not awaken from her sleep on a consistent basis from her left-sided symptoms  She is concerned enough that she wishes to proceed with surgical release for her left carpal tunnel  PAST MEDICAL HISTORY:  Past Medical History:   Diagnosis Date    COVID-19 03/29/2020    Pt was admitted with breathing difficulties to [address] [place] [address]; Pt had a fever, loss of taste and smell      Depression     High cholesterol     Hypertension     Insomnia     Spinal stenosis        PAST SURGICAL HISTORY:  Past Surgical History:   Procedure Laterality Date    BREAST SURGERY Bilateral     Breast augmentation     SECTION      ENDOMETRIAL ABLATION      FL REVISE MEDIAN N/CARPAL TUNNEL SURG Right 2021    Procedure: RELEASE CARPAL TUNNEL;  Surgeon: Mor Stoll; Location: [facility];  Service: Orthopedics    TUBAL LIGATION         FAMILY HISTORY:  Family History   Problem Relation Age of Onset    Dementia Mother     Heart disease Father        SOCIAL HISTORY:  Social History     Tobacco Use    Smoking status: Former Smoker     Quit date: 2008     Years since quittin 9    Smokeless tobacco: Never Used   Vaping Use    Vaping Use: Never used   Substance Use Topics    Alcohol use: Yes     Comment: occasional    Drug use: Not Currently       MEDICATIONS:    Current Outpatient Medications:     albuterol (PROVENTIL HFA,VENTOLIN HFA) 90 mcg/act inhaler, Inhale 2 puffs every 4 (four) hours as needed for wheezing or shortness of breath, Disp: 1 Inhaler, Rfl: 1    aspirin (ECOTRIN) 325 mg EC tablet, Take 325 mg by mouth daily Pt was instructed to stop one week before surgery on - pt stopped on , Disp: , Rfl:     lisinopril-hydrochlorothiazide (PRINZIDE,ZESTORETIC) 20-25 MG per tablet, Take 1 tablet by mouth daily, Disp: , Rfl:     Multiple Minerals-Vitamins (Ulis-Mag-Zinc-D) TABS, Take by mouth, Disp: , Rfl:     Omega-3 Fatty Acids (Fish Oil) 500 MG CAPS, Take 2 capsules by mouth daily, Disp: , Rfl:     PARoxetine (PAXIL) 40 MG tablet, Take 40 mg by mouth every morning, Disp: , Rfl:     pravastatin (PRAVACHOL) 20 mg tablet, Take 20 mg by mouth daily, Disp: , Rfl:     HYDROcodone-acetaminophen (NORCO) 5-325 mg per tablet, Take 1 tablet by mouth every 6 (six) hours as needed for pain for up to 10 dosesMax Daily Amount: 4 tablets (Patient not taking: Reported on 9/15/2021), Disp: 10 tablet, Rfl: 0    ALLERGIES:  No Known Allergies    Review of systems:   Constitutional: Negative for fatigue, fever or loss of apetite     HENT: Negative  Respiratory: Negative for shortness of breath, dyspnea  Cardiovascular: Negative for chest pain/tightness  Gastrointestinal: Negative for abdominal pain, N/V  Endocrine: Negative for cold/heat intolerance, unexplained weight loss/gain  Genitourinary: Negative for flank pain, dysuria, hematuria  Musculoskeletal:   Positive as in the HPI   Skin: Negative for rash  Neurological:  Positive as in the HPI  Psychiatric/Behavioral: Negative for agitation  _____________________________________________________  PHYSICAL EXAMINATION:    Blood pressure 118/78, pulse 80, temperature (!) 96 3 °F (35 7 °C), temperature source Temporal, height 5' 4" (1 626 m), weight 72 6 kg (160 lb)  General: well developed and well nourished, alert, oriented times 3 and appears comfortable  Psychiatric: Normal  HEENT: Benign  Cardiovascular: Regular    Pulmonary: No wheezing or stridor  Abdomen: Soft, Nontender  Skin: No masses, erythema, lacerations, fluctation, ulcerations  Neurovascular: Motor and sensory exams are intact including median nerve distribution sensation and strength  Pulses are palpable  Good color and capillary refill is noted  MUSCULOSKELETAL EXAMINATION:      The left wrist exam demonstrates no localized tenderness  She has excellent range of motion of the wrist and fingers  Skin is warm and dry  Provocative testing was negative today as was noted previously in the past   There is no thenar atrophy noted  The right wrist exam demonstrates a well-healed incision, no localized tenderness and excellent range of motion of the wrist and finger/ thumb  She has excellent strength throughout the left and right upper extremities  _____________________________________________________  STUDIES REVIEWED:    Her EMG report was again reviewed indicating moderate median nerve compression neuropathy at the left wrist consistent with carpal tunnel syndrome          Governor Andrew

## 2021-09-16 ENCOUNTER — PREP FOR PROCEDURE (OUTPATIENT)
Dept: OBGYN CLINIC | Facility: CLINIC | Age: 61
End: 2021-09-16

## 2021-09-28 ENCOUNTER — TELEPHONE (OUTPATIENT)
Dept: PAIN MEDICINE | Facility: CLINIC | Age: 61
End: 2021-09-28

## 2021-10-18 NOTE — TELEPHONE ENCOUNTER
Patient wishes to cancel Left Carpal Tunnel release procedure until a later date  She will call back when ready  All appointments have been cancelled

## 2021-10-21 ENCOUNTER — OFFICE VISIT (OUTPATIENT)
Dept: OBGYN CLINIC | Facility: CLINIC | Age: 61
End: 2021-10-21

## 2021-10-21 VITALS
HEART RATE: 85 BPM | SYSTOLIC BLOOD PRESSURE: 120 MMHG | BODY MASS INDEX: 27.14 KG/M2 | HEIGHT: 64 IN | WEIGHT: 159 LBS | TEMPERATURE: 97.2 F | DIASTOLIC BLOOD PRESSURE: 72 MMHG

## 2021-10-21 DIAGNOSIS — G56.03 BILATERAL CARPAL TUNNEL SYNDROME: Primary | ICD-10-CM

## 2021-10-21 PROCEDURE — 99024 POSTOP FOLLOW-UP VISIT: CPT | Performed by: ORTHOPAEDIC SURGERY

## 2021-12-15 ENCOUNTER — APPOINTMENT (OUTPATIENT)
Dept: LAB | Facility: HOSPITAL | Age: 61
End: 2021-12-15
Payer: COMMERCIAL

## 2021-12-15 DIAGNOSIS — I10 ESSENTIAL HYPERTENSION, MALIGNANT: ICD-10-CM

## 2021-12-15 DIAGNOSIS — E78.5 HYPERLIPIDEMIA, UNSPECIFIED HYPERLIPIDEMIA TYPE: ICD-10-CM

## 2021-12-15 LAB
ALBUMIN SERPL BCP-MCNC: 3.5 G/DL (ref 3.5–5)
ALP SERPL-CCNC: 68 U/L (ref 46–116)
ALT SERPL W P-5'-P-CCNC: 38 U/L (ref 12–78)
ANION GAP SERPL CALCULATED.3IONS-SCNC: 6 MMOL/L (ref 4–13)
AST SERPL W P-5'-P-CCNC: 21 U/L (ref 5–45)
BASOPHILS # BLD AUTO: 0.03 THOUSANDS/ΜL (ref 0–0.1)
BASOPHILS NFR BLD AUTO: 1 % (ref 0–1)
BILIRUB SERPL-MCNC: 0.15 MG/DL (ref 0.2–1)
BUN SERPL-MCNC: 16 MG/DL (ref 5–25)
CALCIUM SERPL-MCNC: 8.9 MG/DL (ref 8.3–10.1)
CHLORIDE SERPL-SCNC: 105 MMOL/L (ref 100–108)
CHOLEST SERPL-MCNC: 198 MG/DL
CO2 SERPL-SCNC: 30 MMOL/L (ref 21–32)
CREAT SERPL-MCNC: 0.92 MG/DL (ref 0.6–1.3)
EOSINOPHIL # BLD AUTO: 0.11 THOUSAND/ΜL (ref 0–0.61)
EOSINOPHIL NFR BLD AUTO: 2 % (ref 0–6)
ERYTHROCYTE [DISTWIDTH] IN BLOOD BY AUTOMATED COUNT: 12.6 % (ref 11.6–15.1)
GFR SERPL CREATININE-BSD FRML MDRD: 66 ML/MIN/1.73SQ M
GLUCOSE P FAST SERPL-MCNC: 106 MG/DL (ref 65–99)
HCT VFR BLD AUTO: 39.1 % (ref 34.8–46.1)
HDLC SERPL-MCNC: 72 MG/DL
HGB BLD-MCNC: 13.1 G/DL (ref 11.5–15.4)
IMM GRANULOCYTES # BLD AUTO: 0.01 THOUSAND/UL (ref 0–0.2)
IMM GRANULOCYTES NFR BLD AUTO: 0 % (ref 0–2)
LDLC SERPL CALC-MCNC: 118 MG/DL (ref 0–100)
LYMPHOCYTES # BLD AUTO: 1.97 THOUSANDS/ΜL (ref 0.6–4.47)
LYMPHOCYTES NFR BLD AUTO: 40 % (ref 14–44)
MCH RBC QN AUTO: 33.3 PG (ref 26.8–34.3)
MCHC RBC AUTO-ENTMCNC: 33.5 G/DL (ref 31.4–37.4)
MCV RBC AUTO: 100 FL (ref 82–98)
MONOCYTES # BLD AUTO: 0.33 THOUSAND/ΜL (ref 0.17–1.22)
MONOCYTES NFR BLD AUTO: 7 % (ref 4–12)
NEUTROPHILS # BLD AUTO: 2.43 THOUSANDS/ΜL (ref 1.85–7.62)
NEUTS SEG NFR BLD AUTO: 50 % (ref 43–75)
NONHDLC SERPL-MCNC: 126 MG/DL
NRBC BLD AUTO-RTO: 0 /100 WBCS
PLATELET # BLD AUTO: 260 THOUSANDS/UL (ref 149–390)
PMV BLD AUTO: 10.1 FL (ref 8.9–12.7)
POTASSIUM SERPL-SCNC: 3.9 MMOL/L (ref 3.5–5.3)
PROT SERPL-MCNC: 7.5 G/DL (ref 6.4–8.2)
RBC # BLD AUTO: 3.93 MILLION/UL (ref 3.81–5.12)
SODIUM SERPL-SCNC: 141 MMOL/L (ref 136–145)
TRIGL SERPL-MCNC: 39 MG/DL
WBC # BLD AUTO: 4.88 THOUSAND/UL (ref 4.31–10.16)

## 2021-12-15 PROCEDURE — 80061 LIPID PANEL: CPT

## 2021-12-15 PROCEDURE — 80053 COMPREHEN METABOLIC PANEL: CPT

## 2021-12-15 PROCEDURE — 85025 COMPLETE CBC W/AUTO DIFF WBC: CPT

## 2021-12-15 PROCEDURE — 36415 COLL VENOUS BLD VENIPUNCTURE: CPT

## 2022-05-06 ENCOUNTER — ANESTHESIA (OUTPATIENT)
Dept: GASTROENTEROLOGY | Facility: HOSPITAL | Age: 62
End: 2022-05-06

## 2022-05-06 ENCOUNTER — HOSPITAL ENCOUNTER (OUTPATIENT)
Dept: GASTROENTEROLOGY | Facility: HOSPITAL | Age: 62
Setting detail: OUTPATIENT SURGERY
Discharge: HOME | End: 2022-05-06
Attending: INTERNAL MEDICINE
Payer: COMMERCIAL

## 2022-05-06 ENCOUNTER — ANESTHESIA EVENT (OUTPATIENT)
Dept: GASTROENTEROLOGY | Facility: HOSPITAL | Age: 62
End: 2022-05-06

## 2022-05-06 VITALS
DIASTOLIC BLOOD PRESSURE: 64 MMHG | HEART RATE: 78 BPM | BODY MASS INDEX: 27.55 KG/M2 | OXYGEN SATURATION: 99 % | HEIGHT: 64 IN | WEIGHT: 161.4 LBS | TEMPERATURE: 97.7 F | RESPIRATION RATE: 18 BRPM | SYSTOLIC BLOOD PRESSURE: 115 MMHG

## 2022-05-06 DIAGNOSIS — Z86.010 PERSONAL HISTORY OF COLONIC POLYPS: ICD-10-CM

## 2022-05-06 PROBLEM — F32.A DEPRESSION: Status: ACTIVE | Noted: 2022-05-06

## 2022-05-06 PROBLEM — F41.9 ANXIETY: Status: ACTIVE | Noted: 2022-05-06

## 2022-05-06 PROBLEM — J96.01 ACUTE RESPIRATORY FAILURE WITH HYPOXIA (HCC): Status: RESOLVED | Noted: 2020-03-31 | Resolved: 2022-05-06

## 2022-05-06 PROCEDURE — 88305 TISSUE EXAM BY PATHOLOGIST: CPT | Performed by: SPECIALIST

## 2022-05-06 RX ORDER — LIDOCAINE HYDROCHLORIDE 10 MG/ML
INJECTION, SOLUTION EPIDURAL; INFILTRATION; INTRACAUDAL; PERINEURAL AS NEEDED
Status: DISCONTINUED | OUTPATIENT
Start: 2022-05-06 | End: 2022-05-06

## 2022-05-06 RX ORDER — PROPOFOL 10 MG/ML
INJECTION, EMULSION INTRAVENOUS AS NEEDED
Status: DISCONTINUED | OUTPATIENT
Start: 2022-05-06 | End: 2022-05-06

## 2022-05-06 RX ORDER — SODIUM CHLORIDE, SODIUM LACTATE, POTASSIUM CHLORIDE, CALCIUM CHLORIDE 600; 310; 30; 20 MG/100ML; MG/100ML; MG/100ML; MG/100ML
INJECTION, SOLUTION INTRAVENOUS CONTINUOUS PRN
Status: DISCONTINUED | OUTPATIENT
Start: 2022-05-06 | End: 2022-05-06

## 2022-05-06 RX ORDER — SODIUM CHLORIDE, SODIUM LACTATE, POTASSIUM CHLORIDE, CALCIUM CHLORIDE 600; 310; 30; 20 MG/100ML; MG/100ML; MG/100ML; MG/100ML
100 INJECTION, SOLUTION INTRAVENOUS CONTINUOUS
Status: DISCONTINUED | OUTPATIENT
Start: 2022-05-06 | End: 2022-05-10 | Stop reason: HOSPADM

## 2022-05-06 RX ADMIN — LIDOCAINE HYDROCHLORIDE 50 MG: 10 INJECTION, SOLUTION EPIDURAL; INFILTRATION; INTRACAUDAL; PERINEURAL at 12:48

## 2022-05-06 RX ADMIN — PROPOFOL 30 MG: 10 INJECTION, EMULSION INTRAVENOUS at 13:06

## 2022-05-06 RX ADMIN — PROPOFOL 30 MG: 10 INJECTION, EMULSION INTRAVENOUS at 13:02

## 2022-05-06 RX ADMIN — PROPOFOL 20 MG: 10 INJECTION, EMULSION INTRAVENOUS at 13:27

## 2022-05-06 RX ADMIN — PROPOFOL 20 MG: 10 INJECTION, EMULSION INTRAVENOUS at 13:31

## 2022-05-06 RX ADMIN — PROPOFOL 20 MG: 10 INJECTION, EMULSION INTRAVENOUS at 13:30

## 2022-05-06 RX ADMIN — PROPOFOL 20 MG: 10 INJECTION, EMULSION INTRAVENOUS at 12:53

## 2022-05-06 RX ADMIN — PROPOFOL 20 MG: 10 INJECTION, EMULSION INTRAVENOUS at 13:23

## 2022-05-06 RX ADMIN — PROPOFOL 20 MG: 10 INJECTION, EMULSION INTRAVENOUS at 12:55

## 2022-05-06 RX ADMIN — PROPOFOL 20 MG: 10 INJECTION, EMULSION INTRAVENOUS at 13:15

## 2022-05-06 RX ADMIN — PROPOFOL 30 MG: 10 INJECTION, EMULSION INTRAVENOUS at 12:57

## 2022-05-06 RX ADMIN — PROPOFOL 50 MG: 10 INJECTION, EMULSION INTRAVENOUS at 13:09

## 2022-05-06 RX ADMIN — PROPOFOL 20 MG: 10 INJECTION, EMULSION INTRAVENOUS at 13:20

## 2022-05-06 RX ADMIN — PROPOFOL 30 MG: 10 INJECTION, EMULSION INTRAVENOUS at 13:00

## 2022-05-06 RX ADMIN — PROPOFOL 20 MG: 10 INJECTION, EMULSION INTRAVENOUS at 12:51

## 2022-05-06 RX ADMIN — PROPOFOL 30 MG: 10 INJECTION, EMULSION INTRAVENOUS at 13:12

## 2022-05-06 RX ADMIN — PROPOFOL 30 MG: 10 INJECTION, EMULSION INTRAVENOUS at 13:18

## 2022-05-06 RX ADMIN — PROPOFOL 130 MG: 10 INJECTION, EMULSION INTRAVENOUS at 12:48

## 2022-05-06 RX ADMIN — SODIUM CHLORIDE, SODIUM LACTATE, POTASSIUM CHLORIDE, AND CALCIUM CHLORIDE: .6; .31; .03; .02 INJECTION, SOLUTION INTRAVENOUS at 12:45

## 2022-05-06 NOTE — ANESTHESIA PREPROCEDURE EVALUATION
Procedure:  COLONOSCOPY    Relevant Problems   ANESTHESIA (within normal limits)      CARDIO   (+) Essential hypertension   (+) Other hyperlipidemia   (-) CAD (coronary artery disease)   (-) History of PTCA   (-) MI (myocardial infarction) (HCC)      ENDO   (-) Diabetes mellitus, type 2 (HCC)   (-) Hypothyroidism      GI/HEPATIC   (-) Dysphagia   (-) Gastroesophageal reflux disease      HEMATOLOGY   (-) Coagulation disorder (HCC)   (-) Hypercoagulable state (HCC)      NEURO/PSYCH   (+) Anxiety   (+) Depression   (-) CVA (cerebral vascular accident) ([address] )   (-) Seizures ([facility] [address] )      PULMONARY   (+) Acute respiratory failure with hypoxia ([facility]) (Resolved)   (-) Asthma   (-) Chronic obstructive pulmonary disease (HCC)   (-) Oxygen dependent   (-) Respiratory failure (HCC)   (-) Shortness of breath   (-) Sleep apnea   (-) Smoking      Preliminary Findings  (6/2020)  Patient had a min HR of 61 bpm, max HR of 174 bpm, and avg HR of 93   bpm  Predominant underlying rhythm was Sinus Rhythm  2   Supraventricular Tachycardia runs occurred, the run with the fastest   interval lasting 10 beats with a max rate of 169 bpm (avg 159 bpm); the   run with the fastest interval was also the longest  Isolated SVEs were rare   (<1 0%), SVE Couplets were rare (<1 0%), and SVE Triplets were rare   (<1 0%)  Isolated VEs were rare (<1 0%), VE Couplets were rare (<1 0%),   and no VE Triplets were present  Physical Exam    Airway    Mallampati score: I  TM Distance: >3 FB  Neck ROM: full     Dental   No notable dental hx     Cardiovascular  Rhythm: regular, Rate: normal,     Pulmonary  Breath sounds clear to auscultation,     Other Findings        Anesthesia Plan  ASA Score- 2     Anesthesia Type- IV sedation with anesthesia with ASA Monitors  Additional Monitors:   Airway Plan:           Plan Factors-Exercise tolerance (METS): >4 METS  Chart reviewed  Patient summary reviewed  Patient is not a current smoker       Obstructive sleep apnea risk education given perioperatively  Induction- intravenous  Postoperative Plan-     Informed Consent- Anesthetic plan and risks discussed with patient  I personally reviewed this patient with the CRNA  Discussed and agreed on the Anesthesia Plan with the CRNA  Machelle Mitchell

## 2022-05-06 NOTE — ANESTHESIA POSTPROCEDURE EVALUATION
Post-Op Assessment Note    CV Status:  Stable    Pain management: satisfactory to patient     Mental Status:  Alert and awake   Hydration Status:  Stable   PONV Controlled:  None   Airway Patency:  Patent      Post Op Vitals Reviewed: Yes      Staff: CRNA         No complications documented      /55 (05/06/22 1338)    Temp 97 7 °F (36 5 °C) (05/06/22 1338)    Pulse 83 (05/06/22 1338)   Resp 18 (05/06/22 1338)    SpO2 (P) 98 % (05/06/22 1338)

## 2022-05-06 NOTE — H&P
History and Physical - [facility] 60 y o  female MRN: [id number]                  HPI: Rayann Frankel is a 60y o  year old female who presents for colonoscopy for history of colon polyps  Last colonoscopy was in   Her last dose of full-dose aspirin was 1 week ago  REVIEW OF SYSTEMS: Per the HPI, and otherwise unremarkable  Historical Information   Past Medical History:   Diagnosis Date    Colon cancer screening 21    Colon polyp     COVID-19 2020    Pt was admitted with breathing difficulties to [facility]; Pt had a fever, loss of taste and smell   Depression     High cholesterol     Hypertension     Insomnia     Spinal stenosis      Past Surgical History:   Procedure Laterality Date    BREAST SURGERY Bilateral     Breast augmentation     SECTION      COLONOSCOPY      ENDOMETRIAL ABLATION      TN REVISE MEDIAN N/CARPAL TUNNEL SURG Right 2021    Procedure: RELEASE CARPAL TUNNEL;  Surgeon: Jessica Berg; Location: [facility];  Service: Orthopedics    TUBAL LIGATION       Social History   Social History     Substance and Sexual Activity   Alcohol Use Yes    Comment: occasional     Social History     Substance and Sexual Activity   Drug Use Never     Social History     Tobacco Use   Smoking Status Former Smoker    Quit date: 2008    Years since quittin 6   Smokeless Tobacco Never Used     Family History   Problem Relation Age of Onset    Dementia Mother     Heart disease Father        Meds/Allergies     (Not in a hospital admission)      No Known Allergies    Objective     Blood pressure 119/62, pulse 84, temperature 98 1 °F (36 7 °C), temperature source Oral, resp  rate 18, height 5' 4" (1 626 m), weight 73 2 kg (161 lb 6 4 oz), SpO2 95 %        PHYSICAL EXAM    Gen: NAD  CV: RRR  CHEST: Clear  ABD: soft, NT/ND  EXT: no edema      ASSESSMENT/PLAN:  This is a 60y o  year old female here for colonoscopy for history of colon polyps  Patient is stable and optimized for procedure      PLAN:   Procedure:  Colonoscopy

## 2022-12-27 ENCOUNTER — TELEPHONE (OUTPATIENT)
Dept: PAIN MEDICINE | Facility: MEDICAL CENTER | Age: 62
End: 2022-12-27

## 2022-12-27 NOTE — TELEPHONE ENCOUNTER
Caller: Patient    Doctor: Yassine Masterson    Reason for call: Patient is not able to get her MRI disc from 2015, hospital did not have the records     Call back#: [phone]

## 2022-12-28 RX ORDER — DOXYCYCLINE 100 MG/1
100 CAPSULE ORAL DAILY
COMMUNITY
Start: 2022-02-16

## 2022-12-28 RX ORDER — METRONIDAZOLE 7.5 MG/G
GEL TOPICAL 2 TIMES DAILY
COMMUNITY
Start: 2022-02-09

## 2022-12-28 RX ORDER — TACROLIMUS 1 MG/G
OINTMENT TOPICAL
COMMUNITY
Start: 2022-10-03

## 2022-12-29 RX ORDER — KETOCONAZOLE 20 MG/ML
SHAMPOO TOPICAL
COMMUNITY
Start: 2022-09-13

## 2022-12-29 RX ORDER — HYDROCORTISONE 25 MG/ML
LOTION TOPICAL
COMMUNITY
Start: 2022-09-13

## 2022-12-30 ENCOUNTER — HOSPITAL ENCOUNTER (OUTPATIENT)
Dept: RADIOLOGY | Facility: CLINIC | Age: 62
End: 2022-12-30

## 2022-12-30 ENCOUNTER — CONSULT (OUTPATIENT)
Dept: PAIN MEDICINE | Facility: CLINIC | Age: 62
End: 2022-12-30

## 2022-12-30 VITALS
RESPIRATION RATE: 20 BRPM | HEART RATE: 78 BPM | BODY MASS INDEX: 29.71 KG/M2 | TEMPERATURE: 97.5 F | SYSTOLIC BLOOD PRESSURE: 120 MMHG | HEIGHT: 64 IN | DIASTOLIC BLOOD PRESSURE: 76 MMHG | WEIGHT: 174 LBS

## 2022-12-30 DIAGNOSIS — M54.16 LUMBAR RADICULOPATHY: Primary | ICD-10-CM

## 2022-12-30 DIAGNOSIS — M54.16 LUMBAR RADICULOPATHY: ICD-10-CM

## 2022-12-30 RX ORDER — GABAPENTIN 300 MG/1
300 CAPSULE ORAL 3 TIMES DAILY
Qty: 90 CAPSULE | Refills: 0 | Status: SHIPPED | OUTPATIENT
Start: 2022-12-30

## 2022-12-30 NOTE — PROGRESS NOTES
Assessment  1  Lumbar radiculopathy  -     Ambulatory referral to Physical Therapy; Future  -     gabapentin (NEURONTIN) 300 mg capsule; Take 1 capsule (300 mg total) by mouth 3 (three) times a day    Bilateral lumbar radicular pain in the L4 dermatomal distribution accompanied by pain limited weakness numbness and paresthesias  Patient has not yet participated with PT  Chronic pain with decreased participation with IADLs over the past 6 months to one year  Has been taking OTC ibuprofen and tylenol with modest benefit  5/5 strength bilaterally, positive SLR left-sided  Reflexes 2+  Additionally there is positive facet loading eliciting pain b/l  Denies any gait instability, saddle anesthesia  On 2015 MRI at L5-S1 there is degenerative grade 1-2 spondylolisthesis as a result of bilateral L5 spondylitic defects  Advanced disc degeneration with moderate circumferential bulging at L4-5 where there is mild broad-based posterior the disc contributing to bilateral moderate to severe protrusion of the degenerative disc  Risks, benefits alternatives epidural steroid injections thoroughly discussed with patient  Handouts provided questions answered to patient's satisfaction  Lifestyle modifications extensively discussed including diet, exercise and weight loss in addition to core strengthening  Will proceed with multimodal pain therapy plan as noted below:    Plan  -f/u after 6 weeks PT  -gabapentin 300 mg t i d  Ordered for patient; counseled regarding sedative effects of taking this medication and provided up titration calendar  Counseled not to take medication while driving or operating heavy machinery/using stairs  -script provided for formal physical therapy for lumbar radiculopathy; Physician directed home exercise plan as per [employer] demonstrated and handouts provided that patient plans to participate with for 1 hour, twice a week for the next 6 weeks       There are risks associated with opioid medications, including dependence, addiction and tolerance  The patient understands and agrees to use these medications only as prescribed  Potential side effects of the medications include, but are not limited to, constipation, drowsiness, addiction, impaired judgment and risk of fatal overdose if not taken as prescribed  The patient was warned against driving while taking sedation medications or operating heavy machinery  The patient voiced understanding  Sharing medications is a felony  At this point in time, the patient is showing no signs of addiction, abuse, diversion or suicidal ideation  [place] Prescription Drug Monitoring Program report was reviewed and was appropriate      Complete risks and benefits including bleeding, infection, tissue reaction, nerve injury and allergic reaction were discussed  The approach was demonstrated using models and literature was provided  Verbal and written consent was obtained  My impressions and treatment recommendations were discussed in detail with the patient who verbalized understanding and had no further questions  Discharge instructions were provided  I personally saw and examined the patient and I agree with the above discussed plan of care  New Medications Ordered This Visit   Medications   • Omega-3 Fatty Acids (OMEGA 3 PO)     Sig: Take 2 capsules by mouth   • cyanocobalamin (VITAMIN B-12) 1000 MCG tablet     Sig: Take 1,000 mcg by mouth daily   • doxycycline monohydrate (MONODOX) 100 mg capsule     Sig: Take 100 mg by mouth daily   • metroNIDAZOLE (METROGEL) 0 75 % gel     Sig: Apply topically 2 (two) times a day   • tacrolimus (PROTOPIC) 0 1 % ointment     Sig: APPLY TO RASH ON THE FACE UP TO DAILY AS NEEDED     • METFORMIN HCL ER PO     Sig: Take 6 mg by mouth daily   • hydrocortisone 2 5 % lotion     Sig: Apply to rash on face twice daily for one week, then once daily for one week, then reach out to your doctor   • ketoconazole (NIZORAL) 2 % shampoo Sig: Use daily to face for two weeks as a face wash  Lather and leave in for 1 minute prior to rinsing  • gabapentin (NEURONTIN) 300 mg capsule     Sig: Take 1 capsule (300 mg total) by mouth 3 (three) times a day     Dispense:  90 capsule     Refill:  0       History of Present Illness    Jarvis Youssef is a 60 y o  female with pmhx of HTN, XOL, depression/anxiety presenting with chronic lumbar radicular pain in the bilateral L4 dermatomal distributions  Debilitating pain limited weakness numbness and paresthesias accompany the pain  The patient rates the pain at a 8/10 accompanied by electric shock-like shooting features and crampy burning pain in the aforementioned dermatomal distributions  The pain is worse in the mornings as well as the end of the day; exertion such as walking for long periods of time seems to exacerbate the pain  She tries to maintain an active lifestyle and finds that the current degree of pain seems to compromises her efforts  The pain significantly impacts independent activities of daily living and contributes to significant disability  She has not yet attempted formal PT  She has taken nsaids and tylenol with limited relief of the pain as well  She has never tried epidural steroid injections in the past  She denies any bowel or bladder dysfunction/incontinence, saddle anesthesia or gait instability  I have personally reviewed and/or updated the patient's past medical history, past surgical history, family history, social history, current medications, allergies, and vital signs today  Review of Systems   Constitutional: Positive for activity change  HENT: Negative  Eyes: Negative  Respiratory: Negative  Cardiovascular: Negative  Gastrointestinal: Negative  Endocrine: Negative  Genitourinary: Negative  Musculoskeletal: Positive for arthralgias, back pain and myalgias  Negative for gait problem  Skin: Negative  Allergic/Immunologic: Negative  Neurological: Positive for weakness and numbness  Hematological: Negative  Psychiatric/Behavioral: Negative  All other systems reviewed and are negative  Patient Active Problem List   Diagnosis   • Hypokalemia   • Essential hypertension   • Other hyperlipidemia   • Acute respiratory disease due to COVID-19 virus   • Bilateral carpal tunnel syndrome   • Anxiety   • Depression   • Lumbar radiculopathy       Past Medical History:   Diagnosis Date   • Colon cancer screening 21   • Colon polyp    • COVID-19 2020    Pt was admitted with breathing difficulties to [address] [place] [address]; Pt had a fever, loss of taste and smell  • Depression    • High cholesterol    • Hypertension    • Insomnia    • Spinal stenosis        Past Surgical History:   Procedure Laterality Date   • BREAST SURGERY Bilateral     Breast augmentation   •  SECTION     • COLONOSCOPY     • ENDOMETRIAL ABLATION     • CT NEUROPLASTY &/TRANSPOS MEDIAN NRV CARPAL TUNNE Right 2021    Procedure: RELEASE CARPAL TUNNEL;  Surgeon: Urbano Geronimo;   Location:  MAIN OR;  Service: Orthopedics   • TUBAL LIGATION         Family History   Problem Relation Age of Onset   • Dementia Mother    • Heart disease Father        Social History     Occupational History   • Not on file   Tobacco Use   • Smoking status: Former     Types: Cigarettes     Quit date: 2008     Years since quittin 2   • Smokeless tobacco: Never   Vaping Use   • Vaping Use: Never used   Substance and Sexual Activity   • Alcohol use: Yes     Comment: occasional   • Drug use: Never   • Sexual activity: Not on file     Comment: did not ask       Current Outpatient Medications on File Prior to Visit   Medication Sig   • aspirin (ECOTRIN) 325 mg EC tablet Take 325 mg by mouth daily Pt was instructed to stop one week before procedure- pt had last dose     • cyanocobalamin (VITAMIN B-12) 1000 MCG tablet Take 1,000 mcg by mouth daily   • ketoconazole (NIZORAL) 2 % shampoo Use daily to face for two weeks as a face wash  Lather and leave in for 1 minute prior to rinsing  • lisinopril-hydrochlorothiazide (PRINZIDE,ZESTORETIC) 20-25 MG per tablet Take 1 tablet by mouth daily   • MELATONIN PO Take by mouth   • Multiple Minerals-Vitamins (Luis-Mag-Zinc-D) TABS Take by mouth   • N-ACETYL CYSTEINE PO Take 1,200 mg by mouth in the morning   • Omega-3 Fatty Acids (Fish Oil) 500 MG CAPS Take 2 capsules by mouth daily   • PARoxetine (PAXIL) 40 MG tablet Take 40 mg by mouth every morning   • pravastatin (PRAVACHOL) 20 mg tablet Take 20 mg by mouth daily   • vitamin B-12 (VITAMIN B-12) 1,000 mcg tablet Take by mouth daily   • albuterol (PROVENTIL HFA,VENTOLIN HFA) 90 mcg/act inhaler Inhale 2 puffs every 4 (four) hours as needed for wheezing or shortness of breath (Patient not taking: Reported on 5/4/2022 )   • doxycycline monohydrate (MONODOX) 100 mg capsule Take 100 mg by mouth daily (Patient not taking: Reported on 12/30/2022)   • HYDROcodone-acetaminophen (NORCO) 5-325 mg per tablet Take 1 tablet by mouth every 6 (six) hours as needed for pain for up to 10 dosesMax Daily Amount: 4 tablets (Patient not taking: Reported on 9/15/2021)   • hydrocortisone 2 5 % lotion Apply to rash on face twice daily for one week, then once daily for one week, then reach out to your doctor (Patient not taking: Reported on 12/30/2022)   • METFORMIN HCL ER PO Take 6 mg by mouth daily (Patient not taking: Reported on 12/30/2022)   • metroNIDAZOLE (METROGEL) 0 75 % gel Apply topically 2 (two) times a day (Patient not taking: Reported on 12/30/2022)   • Omega-3 Fatty Acids (OMEGA 3 PO) Take 2 capsules by mouth (Patient not taking: Reported on 12/30/2022)   • tacrolimus (PROTOPIC) 0 1 % ointment APPLY TO RASH ON THE FACE UP TO DAILY AS NEEDED  (Patient not taking: Reported on 12/30/2022)     No current facility-administered medications on file prior to visit         No Known Allergies      Physical Exam    /76 Pulse 78   Temp 97 5 °F (36 4 °C)   Resp 20   Ht 5' 4" (1 626 m)   Wt 78 9 kg (174 lb)   BMI 29 87 kg/m²     Constitutional: normal, well developed, well nourished, alert, in no distress and non-toxic and no overt pain behavior  and overweight  Eyes: anicteric  HEENT: grossly intact  Neck: supple, symmetric, trachea midline and no masses   Pulmonary:even and unlabored  Cardiovascular:No edema or pitting edema present  Skin:Normal without rashes or lesions and well hydrated  Psychiatric:Mood and affect appropriate  Neurologic:Cranial Nerves II-XII grossly intact Sensation grossly intact; no clonus negative hodge's  Reflexes 2+ and brisk  SLR negative bilaterally  Musculoskeletal:normal gait  5/5 strength bilaterally with AROM in all extremities  Normal heel toe and tip toe walking  Significant pain with lumbar facet loading bilaterally and with lateral spine rotation  ttp over lumbar paraspinal muscles  Negative aisha's test, negative gaenslen's negative SIJ loading bilaterally  Imaging    2015 MRI at L5-S1 there is degenerative grade 1-2 spondylolisthesis as a result of bilateral L5 spondylitic defects  Advanced disc degeneration with moderate circumferential bulging at L4-5 where there is mild broad-based posterior the disc contributing to bilateral moderate to severe protrusion of the degenerative disc

## 2022-12-30 NOTE — PATIENT INSTRUCTIONS
Core Strengthening Exercises   WHAT YOU NEED TO KNOW:   Your core includes the muscles of your lower back, hip, pelvis, and abdomen  Core strengthening exercises help heal and strengthen these muscles  This helps prevent another injury, and keeps your pelvis, spine, and hips in the correct position  DISCHARGE INSTRUCTIONS:   Contact your healthcare provider if:   You have sharp or worsening pain during exercise or at rest     You have questions or concerns about your shoulder exercises  Safety tips:  Talk to your healthcare provider before you start an exercise program  A physical therapist can teach you how to do core strengthening exercises safely  Do the exercises on a mat or firm surface  A firm surface will support your spine and prevent low back pain  Do not do these exercises on a bed  Move slowly and smoothly  Avoid fast or jerky motions  Stop if you feel pain  Core exercises should not be painful  Stop if you feel pain  Breathe normally during core exercises  Do not hold your breath  This may cause an increase in blood pressure and prevent muscle strengthening  Your healthcare provider will tell you when to inhale and exhale during the exercise  Begin all of your exercises with abdominal bracing  Abdominal bracing helps warm up your core muscles  You can also practice abdominal bracing throughout the day  Lie on your back with your knees bent and feet flat on the floor  Place your arms in a relaxed position beside your body  Tighten your abdominal muscles  Pull your belly button in and up toward your spine  Hold for 5 seconds  Relax your muscles  Repeat 10 times  Core strengthening exercises: Your healthcare provider will tell you how often to do these exercises  The provider will also tell you how many repetitions of each exercise you should do  Hold each exercise for 5 seconds or as directed  As you get stronger, increase your hold to 10 to 15 seconds   You can do some of these exercises on a stability ball, or with a weight  Ask your healthcare provider how to use a stability ball or weight for these exercises:  Bridging:  Lie on your back with your knees bent and feet flat on the floor  Rest your arms at your side  Tighten your buttocks, and then lift your hips 1 inch off the floor  Hold for 5 seconds  When you can do this exercise without pain for 10 seconds, increase the distance you lift your hips  A good goal is to be able to lift your hips so that your shoulders, hips, and knees are in a straight line  Dead bug:  Lie on your back with your knees bent and feet flat on the floor  Place your arms in a relaxed position beside your body  Begin with abdominal bracing  Next, raise one leg, keeping your knee bent  Hold for 5 seconds  Repeat with the other leg  When you can do this exercise without pain for 10 to 15 seconds, you may raise one straight leg and hold  Repeat with the other leg  Quadruped:  Place your hands and knees on the floor  Keep your wrists directly below your shoulders and your knees directly below your hips  Pull your belly button in toward your spine  Do not flatten or arch your back  Tighten your abdominal muscles below your belly button  Hold for 5 seconds  When you can do this exercise without pain for 10 to 15 seconds, you may extend one arm and hold  Repeat on the other side  Side bridge exercises:      Standing side bridge:  Stand next to a wall and extend one arm toward the wall  Place your palm flat on the wall with your fingers pointing upward  Begin with abdominal bracing  Next, without moving your feet, slowly bend your arm to 90 degrees  Hold for 5 seconds  Repeat on the other side  When you can do this exercise without pain for 10 to 15 seconds, you may do the bent leg side bridge on the floor  Bent leg side bridge:  Lie on one side with your legs, hips, and shoulders in a straight line   Prop yourself up onto your forearm so your elbow is directly below your shoulder  Bend your knees back to 90 degrees  Begin with abdominal bracing  Next, lift your hips and balance yourself on your forearm and knees  Hold for 5 seconds  Repeat on the other side  When you can do this exercise without pain for 10 to 15 seconds, you may do the straight leg side bridge on the floor  Straight leg side bridge:  Lie on one side with your legs, hips, and shoulders in a straight line  Prop yourself up onto your forearm so your elbow is directly below your shoulder  Begin with abdominal bracing  Lift your hips off the floor and balance yourself on your forearm and the outside of your flexed foot  Do not let your ankle bend sideways  Hold for 5 seconds  Repeat on the other side  When you can do this exercise without pain for 10 to 15 seconds, ask your healthcare provider for more advanced exercises  Superman:  Lie on your stomach  Extend your arms forward on the floor  Tighten your abdominal muscles and lift your right hand and left leg off the floor  Hold this position  Slowly return to the starting position  Tighten your abdominal muscles and lift your left hand and right leg off the floor  Hold this position  Slowly return to the starting position  Clam:  Lie on your side with your knees bent  Put your bottom arm under your head to keep your neck in line  Put your top hand on your hip to keep your pelvis from moving  Put your heels together, and keep them together during this exercise  Slowly raise your top knee toward the ceiling  Then lower your leg so your knees are together  Repeat this exercise 10 times  Then switch sides and do the exercise 10 times with the other leg  Curl up:  Lie on your back with your knees bent and feet flat on the floor  Place your hands, palms down, underneath your lower back  Next, with your elbows on the floor, lift your shoulders and chest 2 to 3 inches off the floor   Keep your head in line with your shoulders  Hold this position  Slowly return to the starting position  Straight leg raises:  Lie on your back with one leg straight  Bend the other knee and place your foot flat on the floor  Tighten your abdominal muscles  Keep your leg straight and slowly lift it straight up 6 to 12 inches off the floor  Hold this position  Lower your leg slowly  Do as many repetitions as directed on this side  Repeat with the other leg  Plank:  Lie on your stomach  Bend your elbows and place your forearms flat on the floor  Lift your chest, stomach, and knees off the floor  Make sure your elbows are below your shoulders  Your body should be in a straight line  Do not let your hips or lower back sink to the ground  Squeeze your abdominal muscles together and hold for 15 seconds  To make this exercise harder, hold for 30 seconds or lift 1 leg at a time  Bicycles:  Lie on your back  Bend both knees and bring them toward your chest  Your calves should be parallel to the floor  Place the palms of your hands on the back of your head  Straighten your right leg and keep it lifted 2 inches off the floor  Raise your head and shoulders off the floor and twist towards your left  Keep your head and shoulders lifted  Bend your right knee while you straighten your left leg  Keep your left leg 2 inches off the floor  Twist your head and chest towards the left leg  Continue to straighten 1 leg at a time and twist        Follow up with your doctor as directed:  Write down your questions so you remember to ask them during your visits  © Copyright [employer] 2022 Information is for End User's use only and may not be sold, redistributed or otherwise used for commercial purposes  All illustrations and images included in CareNotes® are the copyrighted property of [employer]  or [facility] Feliciano   The above information is an  only   It is not intended as medical advice for individual conditions or treatments  Talk to your doctor, nurse or pharmacist before following any medical regimen to see if it is safe and effective for you

## 2023-01-03 ENCOUNTER — TELEPHONE (OUTPATIENT)
Dept: RADIOLOGY | Facility: MEDICAL CENTER | Age: 63
End: 2023-01-03

## 2023-01-03 NOTE — TELEPHONE ENCOUNTER
----- Message from Joyce Arenas MD sent at 1/3/2023  7:53 AM EST -----  Xray shows degenerative changes in lumbar spine   Pars defect is small fracture of lumbar facet joints, she can continue with PT and follow back in 6 weeks to consider MRI lumbar spine thanks  ----- Message -----  From: Interface, Radiology Results In  Sent: 1/2/2023   7:31 AM EST  To: Joyce Arenas MD

## 2023-01-11 ENCOUNTER — TELEPHONE (OUTPATIENT)
Dept: CARDIOLOGY CLINIC | Facility: CLINIC | Age: 63
End: 2023-01-11

## 2023-01-11 NOTE — TELEPHONE ENCOUNTER
----- Message from Alecia Santoyo MD sent at 1/3/2023  7:53 AM EST -----  Xray shows degenerative changes in lumbar spine  Pars defect is small fracture of lumbar facet joints, she can continue with PT and follow back in 6 weeks to consider MRI lumbar spine thanks  ----- Message -----  From: Chasidy, Radiology Results In  Sent: 1/2/2023   7:31 AM EST  To: Alecia Santoyo,       Patient aware and verbally understands instructions, stated if she would go to physical therapy, she was told she would need to go 3 times a week and it would be $150 00 a week  Patient stated she cannot afford the copays for physical therapy

## 2023-02-07 DIAGNOSIS — Z12.31 ENCOUNTER FOR SCREENING MAMMOGRAM FOR MALIGNANT NEOPLASM OF BREAST: ICD-10-CM

## 2023-02-23 ENCOUNTER — HOSPITAL ENCOUNTER (INPATIENT)
Facility: HOSPITAL | Age: 63
LOS: 2 days | Discharge: HOME | End: 2023-02-25
Attending: EMERGENCY MEDICINE | Admitting: STUDENT IN AN ORGANIZED HEALTH CARE EDUCATION/TRAINING PROGRAM

## 2023-02-23 ENCOUNTER — OFFICE VISIT (OUTPATIENT)
Dept: URGENT CARE | Facility: CLINIC | Age: 63
End: 2023-02-23

## 2023-02-23 ENCOUNTER — APPOINTMENT (EMERGENCY)
Dept: CT IMAGING | Facility: HOSPITAL | Age: 63
End: 2023-02-23

## 2023-02-23 VITALS
TEMPERATURE: 98.6 F | WEIGHT: 161 LBS | SYSTOLIC BLOOD PRESSURE: 128 MMHG | DIASTOLIC BLOOD PRESSURE: 80 MMHG | OXYGEN SATURATION: 99 % | HEIGHT: 64 IN | BODY MASS INDEX: 27.49 KG/M2 | HEART RATE: 88 BPM

## 2023-02-23 DIAGNOSIS — K57.12 DIVERTICULITIS OF SMALL INTESTINE WITHOUT PERFORATION OR ABSCESS WITHOUT BLEEDING: Primary | ICD-10-CM

## 2023-02-23 DIAGNOSIS — K57.12: ICD-10-CM

## 2023-02-23 DIAGNOSIS — R10.30 LOWER ABDOMINAL PAIN: Primary | ICD-10-CM

## 2023-02-23 LAB
ALBUMIN SERPL BCP-MCNC: 4.1 G/DL (ref 3.5–5)
ALP SERPL-CCNC: 44 U/L (ref 34–104)
ALT SERPL W P-5'-P-CCNC: 26 U/L (ref 7–52)
ANION GAP SERPL CALCULATED.3IONS-SCNC: 5 MMOL/L (ref 4–13)
APTT PPP: 27 SECONDS (ref 23–37)
AST SERPL W P-5'-P-CCNC: 19 U/L (ref 13–39)
ATRIAL RATE: 80 BPM
ATRIAL RATE: 80 BPM
BACTERIA UR QL AUTO: ABNORMAL /HPF
BASOPHILS # BLD AUTO: 0.03 THOUSANDS/ÂΜL (ref 0–0.1)
BASOPHILS NFR BLD AUTO: 0 % (ref 0–1)
BILIRUB SERPL-MCNC: 0.4 MG/DL (ref 0.2–1)
BILIRUB UR QL STRIP: NEGATIVE
BUN SERPL-MCNC: 9 MG/DL (ref 5–25)
CALCIUM SERPL-MCNC: 9 MG/DL (ref 8.4–10.2)
CHLORIDE SERPL-SCNC: 100 MMOL/L (ref 96–108)
CLARITY UR: CLEAR
CO2 SERPL-SCNC: 32 MMOL/L (ref 21–32)
COLOR UR: YELLOW
CREAT SERPL-MCNC: 0.69 MG/DL (ref 0.6–1.3)
EOSINOPHIL # BLD AUTO: 0.09 THOUSAND/ÂΜL (ref 0–0.61)
EOSINOPHIL NFR BLD AUTO: 1 % (ref 0–6)
ERYTHROCYTE [DISTWIDTH] IN BLOOD BY AUTOMATED COUNT: 12.2 % (ref 11.6–15.1)
GFR SERPL CREATININE-BSD FRML MDRD: 92 ML/MIN/1.73SQ M
GLUCOSE SERPL-MCNC: 93 MG/DL (ref 65–140)
GLUCOSE UR STRIP-MCNC: NEGATIVE MG/DL
HCT VFR BLD AUTO: 39.6 % (ref 34.8–46.1)
HGB BLD-MCNC: 13.1 G/DL (ref 11.5–15.4)
HGB UR QL STRIP.AUTO: NEGATIVE
IMM GRANULOCYTES # BLD AUTO: 0.04 THOUSAND/UL (ref 0–0.2)
IMM GRANULOCYTES NFR BLD AUTO: 0 % (ref 0–2)
INR PPP: 0.99 (ref 0.84–1.19)
KETONES UR STRIP-MCNC: NEGATIVE MG/DL
LACTATE SERPL-SCNC: 0.7 MMOL/L (ref 0.5–2)
LEUKOCYTE ESTERASE UR QL STRIP: ABNORMAL
LIPASE SERPL-CCNC: 12 U/L (ref 11–82)
LYMPHOCYTES # BLD AUTO: 2.44 THOUSANDS/ÂΜL (ref 0.6–4.47)
LYMPHOCYTES NFR BLD AUTO: 24 % (ref 14–44)
MCH RBC QN AUTO: 32 PG (ref 26.8–34.3)
MCHC RBC AUTO-ENTMCNC: 33.1 G/DL (ref 31.4–37.4)
MCV RBC AUTO: 97 FL (ref 82–98)
MONOCYTES # BLD AUTO: 0.62 THOUSAND/ÂΜL (ref 0.17–1.22)
MONOCYTES NFR BLD AUTO: 6 % (ref 4–12)
NEUTROPHILS # BLD AUTO: 6.81 THOUSANDS/ÂΜL (ref 1.85–7.62)
NEUTS SEG NFR BLD AUTO: 69 % (ref 43–75)
NITRITE UR QL STRIP: NEGATIVE
NON-SQ EPI CELLS URNS QL MICRO: ABNORMAL /HPF
NRBC BLD AUTO-RTO: 0 /100 WBCS
P AXIS: 27 DEGREES
P AXIS: 27 DEGREES
PH UR STRIP.AUTO: 7.5 [PH]
PLATELET # BLD AUTO: 230 THOUSANDS/UL (ref 149–390)
PMV BLD AUTO: 9.9 FL (ref 8.9–12.7)
POTASSIUM SERPL-SCNC: 3.6 MMOL/L (ref 3.5–5.3)
PR INTERVAL: 168 MS
PR INTERVAL: 168 MS
PROT SERPL-MCNC: 7.4 G/DL (ref 6.4–8.4)
PROT UR STRIP-MCNC: NEGATIVE MG/DL
PROTHROMBIN TIME: 13.2 SECONDS (ref 11.6–14.5)
QRS AXIS: 4 DEGREES
QRS AXIS: 4 DEGREES
QRSD INTERVAL: 68 MS
QRSD INTERVAL: 68 MS
QT INTERVAL: 362 MS
QT INTERVAL: 362 MS
QTC INTERVAL: 417 MS
QTC INTERVAL: 417 MS
RBC # BLD AUTO: 4.1 MILLION/UL (ref 3.81–5.12)
RBC #/AREA URNS AUTO: ABNORMAL /HPF
SODIUM SERPL-SCNC: 137 MMOL/L (ref 135–147)
SP GR UR STRIP.AUTO: 1.01 (ref 1–1.03)
T WAVE AXIS: 28 DEGREES
T WAVE AXIS: 28 DEGREES
UROBILINOGEN UR QL STRIP.AUTO: 0.2 E.U./DL
VENTRICULAR RATE: 80 BPM
VENTRICULAR RATE: 80 BPM
WBC # BLD AUTO: 10.03 THOUSAND/UL (ref 4.31–10.16)
WBC #/AREA URNS AUTO: ABNORMAL /HPF

## 2023-02-23 RX ORDER — KETOROLAC TROMETHAMINE 30 MG/ML
30 INJECTION, SOLUTION INTRAMUSCULAR; INTRAVENOUS ONCE
Status: COMPLETED | OUTPATIENT
Start: 2023-02-23 | End: 2023-02-23

## 2023-02-23 RX ORDER — ONDANSETRON 2 MG/ML
4 INJECTION INTRAMUSCULAR; INTRAVENOUS EVERY 6 HOURS PRN
Status: DISCONTINUED | OUTPATIENT
Start: 2023-02-23 | End: 2023-02-25 | Stop reason: HOSPADM

## 2023-02-23 RX ORDER — PRAVASTATIN SODIUM 20 MG
20 TABLET ORAL DAILY
Status: DISCONTINUED | OUTPATIENT
Start: 2023-02-24 | End: 2023-02-25 | Stop reason: HOSPADM

## 2023-02-23 RX ORDER — BUSPIRONE HYDROCHLORIDE 10 MG/1
10 TABLET ORAL 2 TIMES DAILY
COMMUNITY
Start: 2023-02-18

## 2023-02-23 RX ORDER — ACETAMINOPHEN 325 MG/1
650 TABLET ORAL EVERY 6 HOURS PRN
Status: DISCONTINUED | OUTPATIENT
Start: 2023-02-23 | End: 2023-02-25 | Stop reason: HOSPADM

## 2023-02-23 RX ORDER — KETOROLAC TROMETHAMINE 30 MG/ML
15 INJECTION, SOLUTION INTRAMUSCULAR; INTRAVENOUS EVERY 6 HOURS PRN
Status: DISCONTINUED | OUTPATIENT
Start: 2023-02-23 | End: 2023-02-25 | Stop reason: HOSPADM

## 2023-02-23 RX ORDER — CEFAZOLIN SODIUM 1 G/50ML
1000 SOLUTION INTRAVENOUS ONCE
Status: COMPLETED | OUTPATIENT
Start: 2023-02-23 | End: 2023-02-23

## 2023-02-23 RX ORDER — CEFAZOLIN SODIUM 1 G/50ML
1000 SOLUTION INTRAVENOUS EVERY 24 HOURS
Status: CANCELLED | OUTPATIENT
Start: 2023-02-23

## 2023-02-23 RX ORDER — MORPHINE SULFATE 4 MG/ML
4 INJECTION, SOLUTION INTRAMUSCULAR; INTRAVENOUS EVERY 4 HOURS PRN
Status: DISCONTINUED | OUTPATIENT
Start: 2023-02-23 | End: 2023-02-25 | Stop reason: HOSPADM

## 2023-02-23 RX ORDER — HYDROCHLOROTHIAZIDE 25 MG/1
25 TABLET ORAL DAILY
Status: DISCONTINUED | OUTPATIENT
Start: 2023-02-24 | End: 2023-02-25 | Stop reason: HOSPADM

## 2023-02-23 RX ORDER — LISINOPRIL 20 MG/1
20 TABLET ORAL DAILY
Status: DISCONTINUED | OUTPATIENT
Start: 2023-02-24 | End: 2023-02-25 | Stop reason: HOSPADM

## 2023-02-23 RX ORDER — SODIUM CHLORIDE, SODIUM LACTATE, POTASSIUM CHLORIDE, CALCIUM CHLORIDE 600; 310; 30; 20 MG/100ML; MG/100ML; MG/100ML; MG/100ML
50 INJECTION, SOLUTION INTRAVENOUS CONTINUOUS
Status: DISCONTINUED | OUTPATIENT
Start: 2023-02-23 | End: 2023-02-25

## 2023-02-23 RX ORDER — METRONIDAZOLE 500 MG/100ML
500 INJECTION, SOLUTION INTRAVENOUS EVERY 8 HOURS
Status: DISCONTINUED | OUTPATIENT
Start: 2023-02-23 | End: 2023-02-25 | Stop reason: HOSPADM

## 2023-02-23 RX ORDER — LANOLIN ALCOHOL/MO/W.PET/CERES
6 CREAM (GRAM) TOPICAL
Status: DISCONTINUED | OUTPATIENT
Start: 2023-02-23 | End: 2023-02-25 | Stop reason: HOSPADM

## 2023-02-23 RX ORDER — CEFTRIAXONE 1 G/50ML
1000 INJECTION, SOLUTION INTRAVENOUS EVERY 24 HOURS
Status: DISCONTINUED | OUTPATIENT
Start: 2023-02-24 | End: 2023-02-25 | Stop reason: HOSPADM

## 2023-02-23 RX ORDER — PAROXETINE HYDROCHLORIDE 20 MG/1
40 TABLET, FILM COATED ORAL EVERY MORNING
Status: DISCONTINUED | OUTPATIENT
Start: 2023-02-24 | End: 2023-02-25 | Stop reason: HOSPADM

## 2023-02-23 RX ADMIN — METRONIDAZOLE 500 MG: 500 INJECTION, SOLUTION INTRAVENOUS at 17:39

## 2023-02-23 RX ADMIN — KETOROLAC TROMETHAMINE 30 MG: 30 INJECTION, SOLUTION INTRAMUSCULAR at 15:45

## 2023-02-23 RX ADMIN — SODIUM CHLORIDE, SODIUM LACTATE, POTASSIUM CHLORIDE, AND CALCIUM CHLORIDE 125 ML/HR: .6; .31; .03; .02 INJECTION, SOLUTION INTRAVENOUS at 18:15

## 2023-02-23 RX ADMIN — CEFAZOLIN SODIUM 1000 MG: 1 SOLUTION INTRAVENOUS at 17:09

## 2023-02-23 RX ADMIN — MORPHINE SULFATE 4 MG: 4 INJECTION INTRAVENOUS at 22:32

## 2023-02-23 RX ADMIN — IOHEXOL 85 ML: 350 INJECTION, SOLUTION INTRAVENOUS at 16:32

## 2023-02-23 RX ADMIN — SODIUM CHLORIDE 1000 ML: 0.9 INJECTION, SOLUTION INTRAVENOUS at 15:47

## 2023-02-23 RX ADMIN — MELATONIN 6 MG: 3 TAB ORAL at 22:00

## 2023-02-23 RX ADMIN — KETOROLAC TROMETHAMINE 15 MG: 30 INJECTION, SOLUTION INTRAMUSCULAR at 21:54

## 2023-02-23 NOTE — ED NOTES
Bhanu hall sent to [facility] charge nurse  PT to be transported to unit   No s/s of distress, Vs stable, A&Ox4     Ashish Glover RN  02/23/23 [id number]

## 2023-02-23 NOTE — PROGRESS NOTES
[facility]        NAME: Elizabeth Edwards is a 58 y o  female  : 1960    MRN: [id number]  DATE: 2023  TIME: 2:47 PM    Assessment and Plan   Lower abdominal pain [R10 30]  1  Lower abdominal pain  Transfer to other facility        EKG performed in clinic and interpretation as follows: Normal sinus rhythm with heart rate 80  Significant left-sided abdominal tenderness on PE and believe she requires higher level of care for advanced imaging  Recommend she proceed to emergency department for further evaluation and management of symptoms  Patient agreeable  Complete assessment deferred to ED  Patient Instructions       Patient Instructions     Proceed to ER for further evaluation and management of symptoms     Abdominal Pain   AMBULATORY CARE:   Abdominal pain  may be felt anywhere between the bottom of your rib cage and your groin  Acute pain usually lasts less than 3 months  Chronic pain lasts longer than 3 months  Your pain may be sharp or dull  The pain may stay in the same place or move around  You may have the pain all the time, or it may come and go  Depending on the cause, you may also have nausea, vomiting, fever, or diarrhea  Call your local emergency number (911 in the [address] [place] [address],[address]) if:   • You have chest pain or shortness of breath  Seek care immediately if:   • You have pulsing pain in your upper abdomen or lower back that suddenly becomes constant  • Your pain is in the right lower abdominal area and worsens with movement  • You have a fever over 100 4°F (38°C) or shaking chills  • You are vomiting and cannot keep food or liquids down  • Your pain does not improve or gets worse over the next 8 to 12 hours  • You see blood in your vomit or bowel movements, or they look black and tarry  • Your skin or the whites of your eyes turn yellow  • You are a woman and have a large amount of vaginal bleeding that is not your monthly period      Call your doctor if:   • You have pain in your lower back  • You are a man and have pain in your testicles  • You have pain when you urinate  • You have questions or concerns about your condition or care  The cause of your abdominal pain  may not be found  The following are common causes:  • Overeating, gas pains, or food poisoning    • Constipation or diarrhea    • An injury    • Appendicitis, a hernia, or an ulcer    • Infection or a blockage    • A liver, gallbladder, or kidney condition    Treatment for abdominal pain  may include any of the following:  • Medicines  may be given to calm your stomach or prevent vomiting  • Prescription pain medicine  may be given  Ask your healthcare provider how to take this medicine safely  Some prescription pain medicines contain acetaminophen  Do not take other medicines that contain acetaminophen without talking to your healthcare provider  Too much acetaminophen may cause liver damage  Prescription pain medicine may cause constipation  Ask your healthcare provider how to prevent or treat constipation  • Relaxation therapy  may be used along with pain medicine  • Surgery  may be needed, depending on the cause  Manage or prevent abdominal pain:   1  Apply heat  on your abdomen for 20 to 30 minutes every 2 hours for as many days as directed  Heat helps decrease pain and muscle spasms  2  Make changes to the foods you eat, if needed  Do not eat foods that cause abdominal pain or other symptoms  Eat small meals more often  The following changes may also help:    ? Eat more high-fiber foods if you are constipated  High-fiber foods include fruits, vegetables, whole-grain foods, and legumes such as monroe beans  ? Do not eat foods that cause gas if you have bloating  Examples include broccoli, cabbage, beans, and carbonated drinks  ? Do not eat foods or drinks that contain sorbitol or fructose if you have diarrhea and bloating    Some examples are fruit juices, candy, jelly, and sugar-free gum  ? Do not eat high-fat foods  Examples include fried foods, cheeseburgers, hot dogs, and desserts  3  Make changes to the liquids you drink, if needed  Do not drink liquids that cause pain or make it worse, such as orange juice  Drink liquids throughout the day to stay hydrated  The following changes may also help:    ? Drink more liquids to prevent dehydration from diarrhea or vomiting  Ask your healthcare provider how much liquid to drink each day and which liquids are best for you  ? Limit or do not have caffeine  Caffeine may make symptoms such as heartburn or nausea worse  ? Limit or do not drink alcohol  Alcohol can make your abdominal pain worse  Ask your healthcare provider if it is okay for you to drink alcohol  Also ask how much is okay for you to drink  A drink of alcohol is 12 ounces of beer, ½ ounce of liquor, or 5 ounces of wine  4  Keep a diary of your abdominal pain  A diary may help your healthcare provider learn what is causing your pain  Include when the pain happens, how long it lasts, and what the pain feels like  Write down any other symptoms you have with abdominal pain  Also write down what you eat, and what symptoms you have after you eat  5  Manage stress  Stress may cause abdominal pain  Your healthcare provider may recommend relaxation techniques and deep breathing exercises to help decrease your stress  Your healthcare provider may recommend you talk to someone about your stress or anxiety, such as a counselor or a friend  Get plenty of sleep  Exercise regularly  6  Do not smoke  Nicotine and other chemicals in cigarettes can damage your esophagus and stomach  Ask your healthcare provider for information if you currently smoke and need help to quit  E-cigarettes or smokeless tobacco still contain nicotine  Talk to your healthcare provider before you use these products      Follow up with your doctor as directed: Write down your questions so you remember to ask them during your visits  © Copyright [employer] 2022 Information is for End User's use only and may not be sold, redistributed or otherwise used for commercial purposes  The above information is an  only  It is not intended as medical advice for individual conditions or treatments  Talk to your doctor, nurse or pharmacist before following any medical regimen to see if it is safe and effective for you  Chief Complaint     Chief Complaint   Patient presents with   • Abdominal Pain     Epigastric pain x 3 days with some distention  no nausea  Last bm this am -normal  Appetite normal          History of Present Illness       16-year-old female with a past medical history significant for hypertension, hyperlipidemia, and diverticulitis presents with complaints of epigastric pain and abdominal bloating x4 days  States the pain has been constant and she has had no relief with OTC Maalox  Patient states normal BM and further denies any nausea, vomiting, or diarrhea  She denies belching or passing flatus  She further denies any fever, chills, chest pain, shortness of breath, or urinary symptoms  She is able to tolerate p o  intake and has been eating/drinking well but pain has been unchanged  Review of Systems   Review of Systems   Constitutional: Negative for chills and fever  HENT: Negative for congestion, ear discharge, ear pain, rhinorrhea, sore throat, trouble swallowing and voice change  Eyes: Negative for discharge  Respiratory: Negative for cough and shortness of breath  Cardiovascular: Negative for chest pain and palpitations  Gastrointestinal: Positive for abdominal distention and abdominal pain  Negative for blood in stool, diarrhea, nausea and vomiting  Genitourinary: Negative for difficulty urinating and dysuria  Musculoskeletal: Negative for myalgias  Skin: Negative for rash           Current Medications Current Outpatient Medications:   •  busPIRone (BUSPAR) 10 mg tablet, Take 10 mg by mouth 2 (two) times a day, Disp: , Rfl:   •  cyanocobalamin (VITAMIN B-12) 1000 MCG tablet, Take 1,000 mcg by mouth daily, Disp: , Rfl:   •  ketoconazole (NIZORAL) 2 % shampoo, Use daily to face for two weeks as a face wash    Lather and leave in for 1 minute prior to rinsing , Disp: , Rfl:   •  lisinopril-hydrochlorothiazide (PRINZIDE,ZESTORETIC) 20-25 MG per tablet, Take 1 tablet by mouth daily, Disp: , Rfl:   •  MELATONIN PO, Take by mouth, Disp: , Rfl:   •  Multiple Minerals-Vitamins (Luis-Mag-Zinc-D) TABS, Take by mouth, Disp: , Rfl:   •  N-ACETYL CYSTEINE PO, Take 1,200 mg by mouth in the morning, Disp: , Rfl:   •  Omega-3 Fatty Acids (Fish Oil) 500 MG CAPS, Take 2 capsules by mouth daily, Disp: , Rfl:   •  Omega-3 Fatty Acids (OMEGA 3 PO), Take 2 capsules by mouth, Disp: , Rfl:   •  PARoxetine (PAXIL) 40 MG tablet, Take 40 mg by mouth every morning, Disp: , Rfl:   •  pravastatin (PRAVACHOL) 20 mg tablet, Take 20 mg by mouth daily, Disp: , Rfl:   •  vitamin B-12 (VITAMIN B-12) 1,000 mcg tablet, Take by mouth daily, Disp: , Rfl:   •  albuterol (PROVENTIL HFA,VENTOLIN HFA) 90 mcg/act inhaler, Inhale 2 puffs every 4 (four) hours as needed for wheezing or shortness of breath, Disp: 1 Inhaler, Rfl: 1  •  aspirin (ECOTRIN) 325 mg EC tablet, Take 325 mg by mouth daily Pt was instructed to stop one week before procedure- pt had last dose 4/28  (Patient not taking: Reported on 2/23/2023), Disp: , Rfl:   •  doxycycline monohydrate (MONODOX) 100 mg capsule, Take 100 mg by mouth daily, Disp: , Rfl:   •  gabapentin (NEURONTIN) 300 mg capsule, Take 1 capsule (300 mg total) by mouth 3 (three) times a day, Disp: 90 capsule, Rfl: 0  •  HYDROcodone-acetaminophen (NORCO) 5-325 mg per tablet, Take 1 tablet by mouth every 6 (six) hours as needed for pain for up to 10 dosesMax Daily Amount: 4 tablets, Disp: 10 tablet, Rfl: 0  •  hydrocortisone 2 5 % lotion, , Disp: , Rfl:   •  METFORMIN HCL ER PO, Take 6 mg by mouth daily, Disp: , Rfl:   •  metroNIDAZOLE (METROGEL) 0 75 % gel, Apply topically 2 (two) times a day, Disp: , Rfl:   •  tacrolimus (PROTOPIC) 0 1 % ointment, , Disp: , Rfl:     Current Allergies     Allergies as of 2023   • (No Known Allergies)            The following portions of the patient's history were reviewed and updated as appropriate: allergies, current medications, past family history, past medical history, past social history, past surgical history and problem list      Past Medical History:   Diagnosis Date   • Anxiety    • Colon cancer screening 21   • Colon polyp    • COVID-19 2020    Pt was admitted with breathing difficulties to [address] [place] [address]; Pt had a fever, loss of taste and smell  • Depression    • Diverticulitis    • High cholesterol    • Hypertension    • Insomnia    • Spinal stenosis        Past Surgical History:   Procedure Laterality Date   • BREAST SURGERY Bilateral     Breast augmentation   •  SECTION     • COLONOSCOPY     • ENDOMETRIAL ABLATION     • SC NEUROPLASTY &/TRANSPOS MEDIAN NRV CARPAL TUNNE Right 2021    Procedure: RELEASE CARPAL TUNNEL;  Surgeon: Corie Cleary; Location:  MAIN OR;  Service: Orthopedics   • TUBAL LIGATION         Family History   Problem Relation Age of Onset   • Dementia Mother    • Heart disease Father          Medications have been verified  Objective   /80   Pulse 88   Temp 98 6 °F (37 °C)   Ht 5' 4" (1 626 m)   Wt 73 kg (161 lb)   SpO2 99%   BMI 27 64 kg/m²   No LMP recorded  Patient is postmenopausal        Physical Exam     Physical Exam  Vitals and nursing note reviewed  Constitutional:       General: She is not in acute distress  Appearance: She is not toxic-appearing  HENT:      Head: Normocephalic  Nose: Nose normal       Mouth/Throat:      Mouth: Mucous membranes are moist       Pharynx: Oropharynx is clear     Eyes: Conjunctiva/sclera: Conjunctivae normal    Cardiovascular:      Rate and Rhythm: Normal rate and regular rhythm  Heart sounds: Normal heart sounds  Pulmonary:      Effort: Pulmonary effort is normal  No respiratory distress  Breath sounds: Normal breath sounds  No stridor  No wheezing, rhonchi or rales  Abdominal:      General: Bowel sounds are normal       Palpations: Abdomen is soft  Tenderness: There is abdominal tenderness in the left upper quadrant and left lower quadrant  Skin:     General: Skin is warm and dry  Neurological:      Mental Status: She is alert and oriented to person, place, and time  Gait: Gait is intact     Psychiatric:         Mood and Affect: Mood normal          Behavior: Behavior normal

## 2023-02-23 NOTE — ED PROVIDER NOTES
History  Chief Complaint   Patient presents with   • Abdominal Pain     Mid epigastric pain for past couple days, seen at urgent care today and sent in to the hospital for further evaluation  Took maalox last night without relief     Is a 65-year-old female with history of diverticulitis prior  section hypertension hyperlipidemia presents to the emergency department complaining of epigastric abdominal pain radiating into the left side of the abdomen has been ongoing for about 3 days now and gradually worsening over the past 2 days  No associated nausea vomiting diarrhea or constipation no fevers or chills  History provided by:  Patient  Abdominal Pain  Pain location:  Epigastric and LUQ  Pain quality: aching and cramping    Pain severity:  Moderate  Onset quality:  Gradual  Duration:  3 days  Timing:  Constant  Progression:  Worsening  Chronicity:  New  Associated symptoms: no chest pain, no chills, no constipation, no cough, no diarrhea, no dysuria, no fatigue, no fever, no hematuria, no nausea, no shortness of breath, no sore throat and no vomiting        Prior to Admission Medications   Prescriptions Last Dose Informant Patient Reported? Taking?    HYDROcodone-acetaminophen (NORCO) 5-325 mg per tablet   No No   Sig: Take 1 tablet by mouth every 6 (six) hours as needed for pain for up to 10 dosesMax Daily Amount: 4 tablets   MELATONIN PO   Yes No   Sig: Take by mouth   METFORMIN HCL ER PO   Yes No   Sig: Take 6 mg by mouth daily   Multiple Minerals-Vitamins (Luis-Mag-Zinc-D) TABS   Yes No   Sig: Take by mouth   N-ACETYL CYSTEINE PO   Yes No   Sig: Take 1,200 mg by mouth in the morning   Omega-3 Fatty Acids (Fish Oil) 500 MG CAPS   Yes No   Sig: Take 2 capsules by mouth daily   Omega-3 Fatty Acids (OMEGA 3 PO)   Yes No   Sig: Take 2 capsules by mouth   PARoxetine (PAXIL) 40 MG tablet   Yes No   Sig: Take 40 mg by mouth every morning   albuterol (PROVENTIL HFA,VENTOLIN HFA) 90 mcg/act inhaler   No No Sig: Inhale 2 puffs every 4 (four) hours as needed for wheezing or shortness of breath   aspirin (ECOTRIN) 325 mg EC tablet   Yes No   Sig: Take 325 mg by mouth daily Pt was instructed to stop one week before procedure- pt had last dose     Patient not taking: Reported on 2023   busPIRone (BUSPAR) 10 mg tablet   Yes No   Sig: Take 10 mg by mouth 2 (two) times a day   cyanocobalamin (VITAMIN B-12) 1000 MCG tablet   Yes No   Sig: Take 1,000 mcg by mouth daily   doxycycline monohydrate (MONODOX) 100 mg capsule   Yes No   Sig: Take 100 mg by mouth daily   gabapentin (NEURONTIN) 300 mg capsule   No No   Sig: Take 1 capsule (300 mg total) by mouth 3 (three) times a day   hydrocortisone 2 5 % lotion   Yes No   ketoconazole (NIZORAL) 2 % shampoo   Yes No   Sig: Use daily to face for two weeks as a face wash  Lather and leave in for 1 minute prior to rinsing  lisinopril-hydrochlorothiazide (PRINZIDE,ZESTORETIC) 20-25 MG per tablet   Yes No   Sig: Take 1 tablet by mouth daily   metroNIDAZOLE (METROGEL) 0 75 % gel   Yes No   Sig: Apply topically 2 (two) times a day   pravastatin (PRAVACHOL) 20 mg tablet   Yes No   Sig: Take 20 mg by mouth daily   tacrolimus (PROTOPIC) 0 1 % ointment   Yes No   vitamin B-12 (VITAMIN B-12) 1,000 mcg tablet   Yes No   Sig: Take by mouth daily      Facility-Administered Medications: None       Past Medical History:   Diagnosis Date   • Anxiety    • Colon cancer screening 21   • Colon polyp    • COVID-19 2020    Pt was admitted with breathing difficulties to [address] [place] [address]; Pt had a fever, loss of taste and smell     • Depression    • Diverticulitis    • High cholesterol    • Hypertension    • Insomnia    • Spinal stenosis        Past Surgical History:   Procedure Laterality Date   • BREAST SURGERY Bilateral     Breast augmentation   •  SECTION     • COLONOSCOPY     • ENDOMETRIAL ABLATION     • [facility] NEUROPLASTY &/TRANSPOS MEDIAN NRV CARPAL TUNNE Right 2021    Procedure: RELEASE CARPAL TUNNEL;  Surgeon: Leyla Key; Location:  MAIN OR;  Service: Orthopedics   • TUBAL LIGATION         Family History   Problem Relation Age of Onset   • Dementia Mother    • Heart disease Father      I have reviewed and agree with the history as documented  E-Cigarette/Vaping   • E-Cigarette Use Never User      E-Cigarette/Vaping Substances     Social History     Tobacco Use   • Smoking status: Former     Types: Cigarettes     Quit date: 2008     Years since quittin 4   • Smokeless tobacco: Never   Vaping Use   • Vaping Use: Never used   Substance Use Topics   • Alcohol use: Yes     Comment: occasional   • Drug use: Never       Review of Systems   Constitutional: Negative for activity change, appetite change, chills, fatigue and fever  HENT: Negative for congestion, ear pain, rhinorrhea and sore throat  Eyes: Negative for discharge, redness and visual disturbance  Respiratory: Negative for cough, chest tightness, shortness of breath and wheezing  Cardiovascular: Negative for chest pain and palpitations  Gastrointestinal: Positive for abdominal pain  Negative for constipation, diarrhea, nausea and vomiting  Endocrine: Negative for polydipsia and polyuria  Genitourinary: Negative for difficulty urinating, dysuria, frequency, hematuria and urgency  Musculoskeletal: Negative for arthralgias and myalgias  Skin: Negative for color change, pallor and rash  Neurological: Negative for dizziness, weakness, light-headedness, numbness and headaches  Hematological: Negative for adenopathy  Does not bruise/bleed easily  All other systems reviewed and are negative  Physical Exam  Physical Exam  Vitals and nursing note reviewed  Constitutional:       Appearance: She is well-developed  HENT:      Head: Normocephalic and atraumatic        Right Ear: External ear normal       Left Ear: External ear normal       Nose: Nose normal    Eyes:      Conjunctiva/sclera: Conjunctivae normal       Pupils: Pupils are equal, round, and reactive to light  Cardiovascular:      Rate and Rhythm: Normal rate and regular rhythm  Heart sounds: Normal heart sounds  Pulmonary:      Effort: Pulmonary effort is normal  No respiratory distress  Breath sounds: Normal breath sounds  No wheezing or rales  Chest:      Chest wall: No tenderness  Abdominal:      General: Bowel sounds are normal  There is no distension  Palpations: Abdomen is soft  Tenderness: There is abdominal tenderness in the epigastric area, left upper quadrant and left lower quadrant  There is no guarding or rebound  Musculoskeletal:         General: Normal range of motion  Cervical back: Normal range of motion and neck supple  Skin:     General: Skin is warm and dry  Neurological:      Mental Status: She is alert and oriented to person, place, and time  Cranial Nerves: No cranial nerve deficit  Sensory: No sensory deficit           Vital Signs  ED Triage Vitals [02/23/23 1525]   Temperature Pulse Respirations Blood Pressure SpO2   (!) 97 °F (36 1 °C) 89 16 129/77 99 %      Temp Source Heart Rate Source Patient Position - Orthostatic VS BP Location FiO2 (%)   Temporal Monitor Sitting Left arm --      Pain Score       5           Vitals:    02/23/23 1525 02/23/23 1645   BP: 129/77    Pulse: 89 83   Patient Position - Orthostatic VS: Sitting          Visual Acuity      ED Medications  Medications   ceFAZolin (ANCEF) IVPB (premix in dextrose) 1,000 mg 50 mL (1,000 mg Intravenous New Bag 2/23/23 1709)   metroNIDAZOLE (FLAGYL) IVPB (premix) 500 mg 100 mL (has no administration in time range)   sodium chloride 0 9 % bolus 1,000 mL (0 mL Intravenous Stopped 2/23/23 1709)   ketorolac (TORADOL) injection 30 mg (30 mg Intravenous Given 2/23/23 1545)   iohexol (OMNIPAQUE) 350 MG/ML injection (SINGLE-DOSE) 85 mL (85 mL Intravenous Given 2/23/23 1632)       Diagnostic Studies  Results Reviewed Procedure Component Value Units Date/Time    Urine Microscopic [[id number]]  (Abnormal) Collected: 02/23/23 1543    Lab Status: Final result Specimen: Urine, Clean Catch Updated: 02/23/23 1629     RBC, UA 1-2 /hpf      WBC, UA 4-10 /hpf      Epithelial Cells Occasional /hpf      Bacteria, UA Occasional /hpf     Protime-INR [[id number]]  (Normal) Collected: 02/23/23 1544    Lab Status: Final result Specimen: Blood from Arm, Right Updated: 02/23/23 1621     Protime 13 2 seconds      INR 0 99    APTT [[id number]]  (Normal) Collected: 02/23/23 1544    Lab Status: Final result Specimen: Blood from Arm, Right Updated: 02/23/23 1621     PTT 27 seconds     Lactic acid [[id number]]  (Normal) Collected: 02/23/23 1544    Lab Status: Final result Specimen: Blood from Arm, Right Updated: 02/23/23 1615     LACTIC ACID 0 7 mmol/L     Narrative:      Result may be elevated if tourniquet was used during collection      Comprehensive metabolic panel [[id number]] Collected: 02/23/23 1544    Lab Status: Final result Specimen: Blood from Arm, Right Updated: 02/23/23 1615     Sodium 137 mmol/L      Potassium 3 6 mmol/L      Chloride 100 mmol/L      CO2 32 mmol/L      ANION GAP 5 mmol/L      BUN 9 mg/dL      Creatinine 0 69 mg/dL      Glucose 93 mg/dL      Calcium 9 0 mg/dL      AST 19 U/L      ALT 26 U/L      Alkaline Phosphatase 44 U/L      Total Protein 7 4 g/dL      Albumin 4 1 g/dL      Total Bilirubin 0 40 mg/dL      eGFR 92 ml/min/1 73sq m     Narrative:      [place] guidelines for Chronic Kidney Disease (CKD):   •  Stage 1 with normal or high GFR (GFR > 90 mL/min/1 73 square meters)  •  Stage 2 Mild CKD (GFR = 60-89 mL/min/1 73 square meters)  •  Stage 3A Moderate CKD (GFR = 45-59 mL/min/1 73 square meters)  •  Stage 3B Moderate CKD (GFR = 30-44 mL/min/1 73 square meters)  •  Stage 4 Severe CKD (GFR = 15-29 mL/min/1 73 square meters)  •  Stage 5 End Stage CKD (GFR <15 mL/min/1 73 square meters)  Note: GFR calculation is accurate only with a steady state creatinine    Lipase [[id number]]  (Normal) Collected: 02/23/23 1544    Lab Status: Final result Specimen: Blood from Arm, Right Updated: 02/23/23 1615     Lipase 12 u/L     UA w Reflex to Microscopic w Reflex to Culture [[id number]]  (Abnormal) Collected: 02/23/23 1543    Lab Status: Final result Specimen: Urine, Clean Catch Updated: 02/23/23 1604     Color, UA Yellow     Clarity, UA Clear     Specific Gravity, UA 1 010     pH, UA 7 5     Leukocytes, UA Small     Nitrite, UA Negative     Protein, UA Negative mg/dl      Glucose, UA Negative mg/dl      Ketones, UA Negative mg/dl      Urobilinogen, UA 0 2 E U /dl      Bilirubin, UA Negative     Occult Blood, UA Negative    CBC and differential [[id number]] Collected: 02/23/23 1544    Lab Status: Final result Specimen: Blood from Arm, Right Updated: 02/23/23 1553     WBC 10 03 Thousand/uL      RBC 4 10 Million/uL      Hemoglobin 13 1 g/dL      Hematocrit 39 6 %      MCV 97 fL      MCH 32 0 pg      MCHC 33 1 g/dL      RDW 12 2 %      MPV 9 9 fL      Platelets 668 Thousands/uL      nRBC 0 /100 WBCs      Neutrophils Relative 69 %      Immat GRANS % 0 %      Lymphocytes Relative 24 %      Monocytes Relative 6 %      Eosinophils Relative 1 %      Basophils Relative 0 %      Neutrophils Absolute 6 81 Thousands/µL      Immature Grans Absolute 0 04 Thousand/uL      Lymphocytes Absolute 2 44 Thousands/µL      Monocytes Absolute 0 62 Thousand/µL      Eosinophils Absolute 0 09 Thousand/µL      Basophils Absolute 0 03 Thousands/µL                  CT abdomen pelvis with contrast   Final Result by Dick Antonio MD (02/23 1706)      Findings suggest an inflamed small bowel diverticulum with probable developing adjacent collection as well as edema in the mesentery  This unusual small bowel diverticulum could represent a Meckel's diverticulum although somewhat more usual in this age    group    Other diverticula appear to be present more superiorly in the left upper quadrant making Meckel's less likely  Surgical consultation is advised  There is focal dilatation of one small bowel loop which may be related to ileus  A complete volvulus of this loop with closed loop configuration at this time is not visualized although there does appear to be some partial twisting of the mesentery in    this area  Partial obstruction is not excluded  There is left colon diverticulosis seen elsewhere without diverticulitis  The appendix is normal       This was discussed with Dr Amber Spaulding at 4:50 PM            Workstation performed: [id number]                    Procedures  Procedures         ED Course  ED Course as of 02/23/23 1724   Thu Feb 23, 2023   1703 Spoke with Dr Foster Waters general surgery on-call reviewed case and findings in the emergency department she will see patient in the ED for further evaluation and treatment  1723 Evaluated by general surgery will admit patient  Medical Decision Making  ddx includes but not limited to diverticulitis pancreatitis gastric perforation intraabdominal abscess  Diverticulitis, intestine, small: acute illness or injury  Amount and/or Complexity of Data Reviewed  Labs: ordered  Decision-making details documented in ED Course  Radiology: ordered and independent interpretation performed  Decision-making details documented in ED Course  Risk  Prescription drug management  Decision regarding hospitalization            Disposition  Final diagnoses:   Diverticulitis, intestine, small     Time reflects when diagnosis was documented in both MDM as applicable and the Disposition within this note     Time User Action Codes Description Comment    2/23/2023  5:05 PM Gm Wilder Add [K57 12] Diverticulitis, intestine, small       ED Disposition     ED Disposition   Admit    Condition   Stable    Date/Time   Thu Feb 23, 2023  5:20 PM    Comment   Case was discussed with Dr Sunni Ventura and the patient's admission status was agreed to be Admission Status: inpatient status to the service of Dr Sunni Ventura  Follow-up Information    None         Patient's Medications   Discharge Prescriptions    No medications on file       No discharge procedures on file      PDMP Review       Value Time User    PDMP Reviewed  Yes 8/19/2021 10:29 AM Blank Don PA-C          ED Provider  Electronically Signed by           Cristóbal Peter DO  02/23/23 6005

## 2023-02-23 NOTE — H&P
H&P Exam - General Surgery   Vira Lnagley 58 y o  female MRN: [id number]  Unit/Bed#: ED 10 Encounter: [id number]    Assessment/Plan     Assessment:  69-year-old female with a past medical history of hypertension and hyperlipidemia who presents due to upper abdominal discomfort  Findings on CT scan show possible inflamed small bowel diverticulum  Plan: We will attempt conservative management with bowel rest and IV antibiotics, cefepime and Flagyl ordered  Pain control as needed with Tylenol, Toradol, and morphine  Repeat CBC, BMP tomorrow  If the patient experiences worsening pain, worsening leukocytosis, or fever, surgical intervention will be considered      History of Present Illness     HPI:  Vira Langley is a 58 y o  female who presents with abdominal pain  The patient states she has had 4 days of upper abdominal pain  She denies any nausea or vomiting  She has not had any fevers  The patient has had diverticulitis in the past however, she states today's pain is different  She has never had a pain like this in the past   The patient's only abdominal surgical history includes a     Review of Systems   Constitutional: Negative  HENT: Negative  Respiratory: Negative  Cardiovascular: Negative  Gastrointestinal: Positive for abdominal pain  Negative for nausea and vomiting  Endocrine: Negative  Genitourinary: Negative  Musculoskeletal: Negative  Skin: Negative  Neurological: Negative  Hematological: Negative  Historical Information   Past Medical History:   Diagnosis Date   • Anxiety    • Colon cancer screening 21   • Colon polyp    • COVID-19 2020    Pt was admitted with breathing difficulties to [address] [place] [address]; Pt had a fever, loss of taste and smell     • Depression    • Diverticulitis    • High cholesterol    • Hypertension    • Insomnia    • Spinal stenosis      Past Surgical History:   Procedure Laterality Date   • BREAST SURGERY Bilateral     Breast augmentation   •  SECTION     • COLONOSCOPY     • ENDOMETRIAL ABLATION     • [place] NEUROPLASTY &/TRANSPOS MEDIAN NRV CARPAL TUNNE Right 2021    Procedure: RELEASE CARPAL TUNNEL;  Surgeon: Nisa Singletary; Location: [facility];  Service: Orthopedics   • TUBAL LIGATION       Social History   Social History     Substance and Sexual Activity   Alcohol Use Yes    Comment: occasional     Social History     Substance and Sexual Activity   Drug Use Never     Social History     Tobacco Use   Smoking Status Former   • Types: Cigarettes   • Quit date: 2008   • Years since quittin 4   Smokeless Tobacco Never     E-Cigarette/Vaping   • E-Cigarette Use Never User      E-Cigarette/Vaping Substances     Family History: non-contributory    Meds/Allergies   all medications and allergies reviewed  No Known Allergies    Objective   First Vitals:   Blood Pressure: 129/77 (23 1525)  Pulse: 89 (23 1525)  Temperature: (!) 97 °F (36 1 °C) (23 1525)  Temp Source: Temporal (23 1525)  Respirations: 16 (23 1525)  Weight - Scale: 73 6 kg (162 lb 3 2 oz) (23 1525)  SpO2: 99 % (23 1525)    Current Vitals:   Blood Pressure: 129/77 (23 1525)  Pulse: 83 (23 1645)  Temperature: (!) 97 °F (36 1 °C) (23 1525)  Temp Source: Temporal (23 1525)  Respirations: 16 (23 1525)  Weight - Scale: 73 6 kg (162 lb 3 2 oz) (23 1525)  SpO2: 96 % (23 1645)      Intake/Output Summary (Last 24 hours) at 2023 1729  Last data filed at 2023 1709  Gross per 24 hour   Intake 1000 ml   Output --   Net 1000 ml       Invasive Devices     Peripheral Intravenous Line  Duration           Peripheral IV 23 Right Antecubital <1 day                Physical Exam  Constitutional:       Appearance: Normal appearance  HENT:      Head: Normocephalic and atraumatic        Mouth/Throat:      Mouth: Mucous membranes are moist    Cardiovascular:      Rate and Rhythm: Normal rate and regular rhythm  Pulmonary:      Effort: Pulmonary effort is normal    Abdominal:      General: There is no distension  Palpations: Abdomen is soft  Tenderness: There is abdominal tenderness (epigastric and Left side)  There is no guarding or rebound  Skin:     General: Skin is warm and dry  Neurological:      General: No focal deficit present  Mental Status: She is alert  Lab Results:   CBC:   Lab Results   Component Value Date    WBC 10 03 02/23/2023    HGB 13 1 02/23/2023    HCT 39 6 02/23/2023    MCV 97 02/23/2023     02/23/2023    MCH 32 0 02/23/2023    MCHC 33 1 02/23/2023    RDW 12 2 02/23/2023    MPV 9 9 02/23/2023    NRBC 0 02/23/2023   , CMP:   Lab Results   Component Value Date    SODIUM 137 02/23/2023    K 3 6 02/23/2023     02/23/2023    CO2 32 02/23/2023    BUN 9 02/23/2023    CREATININE 0 69 02/23/2023    CALCIUM 9 0 02/23/2023    AST 19 02/23/2023    ALT 26 02/23/2023    ALKPHOS 44 02/23/2023    EGFR 92 02/23/2023     Imaging: Ct abd/pelvis:      IMPRESSION:     Findings suggest an inflamed small bowel diverticulum with probable developing adjacent collection as well as edema in the mesentery  This unusual small bowel diverticulum could represent a Meckel's diverticulum although somewhat more usual in this age   group  Other diverticula appear to be present more superiorly in the left upper quadrant making Meckel's less likely  Surgical consultation is advised      There is focal dilatation of one small bowel loop which may be related to ileus  A complete volvulus of this loop with closed loop configuration at this time is not visualized although there does appear to be some partial twisting of the mesentery in   this area  Partial obstruction is not excluded      There is left colon diverticulosis seen elsewhere without diverticulitis      The appendix is normal     EKG, Pathology, and Other Studies: I have personally reviewed pertinent reports

## 2023-02-23 NOTE — PATIENT INSTRUCTIONS
Proceed to ER for further evaluation and management of symptoms     Abdominal Pain   AMBULATORY CARE:   Abdominal pain  may be felt anywhere between the bottom of your rib cage and your groin  Acute pain usually lasts less than 3 months  Chronic pain lasts longer than 3 months  Your pain may be sharp or dull  The pain may stay in the same place or move around  You may have the pain all the time, or it may come and go  Depending on the cause, you may also have nausea, vomiting, fever, or diarrhea  Call your local emergency number (911 in the [address] [facility]) if:   You have chest pain or shortness of breath  Seek care immediately if:   You have pulsing pain in your upper abdomen or lower back that suddenly becomes constant  Your pain is in the right lower abdominal area and worsens with movement  You have a fever over 100 4°F (38°C) or shaking chills  You are vomiting and cannot keep food or liquids down  Your pain does not improve or gets worse over the next 8 to 12 hours  You see blood in your vomit or bowel movements, or they look black and tarry  Your skin or the whites of your eyes turn yellow  You are a woman and have a large amount of vaginal bleeding that is not your monthly period  Call your doctor if:   You have pain in your lower back  You are a man and have pain in your testicles  You have pain when you urinate  You have questions or concerns about your condition or care  The cause of your abdominal pain  may not be found  The following are common causes:  Overeating, gas pains, or food poisoning    Constipation or diarrhea    An injury    Appendicitis, a hernia, or an ulcer    Infection or a blockage    A liver, gallbladder, or kidney condition    Treatment for abdominal pain  may include any of the following:  Medicines  may be given to calm your stomach or prevent vomiting  Prescription pain medicine  may be given   Ask your healthcare provider how to take this medicine safely  Some prescription pain medicines contain acetaminophen  Do not take other medicines that contain acetaminophen without talking to your healthcare provider  Too much acetaminophen may cause liver damage  Prescription pain medicine may cause constipation  Ask your healthcare provider how to prevent or treat constipation  Relaxation therapy  may be used along with pain medicine  Surgery  may be needed, depending on the cause  Manage or prevent abdominal pain:   Apply heat  on your abdomen for 20 to 30 minutes every 2 hours for as many days as directed  Heat helps decrease pain and muscle spasms  Make changes to the foods you eat, if needed  Do not eat foods that cause abdominal pain or other symptoms  Eat small meals more often  The following changes may also help:    Eat more high-fiber foods if you are constipated  High-fiber foods include fruits, vegetables, whole-grain foods, and legumes such as monroe beans  Do not eat foods that cause gas if you have bloating  Examples include broccoli, cabbage, beans, and carbonated drinks  Do not eat foods or drinks that contain sorbitol or fructose if you have diarrhea and bloating  Some examples are fruit juices, candy, jelly, and sugar-free gum  Do not eat high-fat foods  Examples include fried foods, cheeseburgers, hot dogs, and desserts  Make changes to the liquids you drink, if needed  Do not drink liquids that cause pain or make it worse, such as orange juice  Drink liquids throughout the day to stay hydrated  The following changes may also help:    Drink more liquids to prevent dehydration from diarrhea or vomiting  Ask your healthcare provider how much liquid to drink each day and which liquids are best for you  Limit or do not have caffeine  Caffeine may make symptoms such as heartburn or nausea worse  Limit or do not drink alcohol  Alcohol can make your abdominal pain worse   Ask your healthcare provider if it is okay for you to drink alcohol  Also ask how much is okay for you to drink  A drink of alcohol is 12 ounces of beer, ½ ounce of liquor, or 5 ounces of wine  Keep a diary of your abdominal pain  A diary may help your healthcare provider learn what is causing your pain  Include when the pain happens, how long it lasts, and what the pain feels like  Write down any other symptoms you have with abdominal pain  Also write down what you eat, and what symptoms you have after you eat  Manage stress  Stress may cause abdominal pain  Your healthcare provider may recommend relaxation techniques and deep breathing exercises to help decrease your stress  Your healthcare provider may recommend you talk to someone about your stress or anxiety, such as a counselor or a friend  Get plenty of sleep  Exercise regularly  Do not smoke  Nicotine and other chemicals in cigarettes can damage your esophagus and stomach  Ask your healthcare provider for information if you currently smoke and need help to quit  E-cigarettes or smokeless tobacco still contain nicotine  Talk to your healthcare provider before you use these products  Follow up with your doctor as directed:  Write down your questions so you remember to ask them during your visits  © Copyright [employer] Boyce 2022 Information is for End User's use only and may not be sold, redistributed or otherwise used for commercial purposes  The above information is an  only  It is not intended as medical advice for individual conditions or treatments  Talk to your doctor, nurse or pharmacist before following any medical regimen to see if it is safe and effective for you

## 2023-02-23 NOTE — ED NOTES
Patient transported to [facility]  02/23/23 4081 Patient: Nuha Lees    Procedure(s):  Electroconvulsive Therapy with Dr Lemus     Diagnosis:Severe recurrent major depression without psychotic features (H) [F33.2]  Diagnosis Additional Information: No value filed.    Anesthesia Type:  General, RSI, ETT    Note:  Anesthesia Post Evaluation    Patient location during evaluation: PACU  Patient participation: Able to fully participate in evaluation  Level of consciousness: awake  Pain management: adequate  Airway patency: patent  Cardiovascular status: acceptable  Respiratory status: acceptable  Hydration status: acceptable  PONV: none     Anesthetic complications: None          Last vitals:  Vitals:    07/19/17 0745 07/19/17 0800 07/19/17 0815   BP: 123/84 118/48 (!) 89/46   Pulse:      Resp: 18 22 22   Temp:  36.8  C (98.3  F)    SpO2: 100% 95% 100%         Electronically Signed By: Rachel Willard MD  July 19, 2017  8:30 AM

## 2023-02-24 PROBLEM — K57.10 SMALL BOWEL DIVERTICULAR DISEASE: Status: ACTIVE | Noted: 2023-02-24

## 2023-02-24 LAB
ANION GAP SERPL CALCULATED.3IONS-SCNC: 1 MMOL/L (ref 4–13)
BASOPHILS # BLD AUTO: 0.02 THOUSANDS/ÂΜL (ref 0–0.1)
BASOPHILS NFR BLD AUTO: 0 % (ref 0–1)
BUN SERPL-MCNC: 11 MG/DL (ref 5–25)
CALCIUM SERPL-MCNC: 8.5 MG/DL (ref 8.4–10.2)
CHLORIDE SERPL-SCNC: 104 MMOL/L (ref 96–108)
CO2 SERPL-SCNC: 31 MMOL/L (ref 21–32)
CREAT SERPL-MCNC: 0.65 MG/DL (ref 0.6–1.3)
EOSINOPHIL # BLD AUTO: 0.12 THOUSAND/ÂΜL (ref 0–0.61)
EOSINOPHIL NFR BLD AUTO: 2 % (ref 0–6)
ERYTHROCYTE [DISTWIDTH] IN BLOOD BY AUTOMATED COUNT: 12.3 % (ref 11.6–15.1)
GFR SERPL CREATININE-BSD FRML MDRD: 94 ML/MIN/1.73SQ M
GLUCOSE SERPL-MCNC: 119 MG/DL (ref 65–140)
HCT VFR BLD AUTO: 34.6 % (ref 34.8–46.1)
HGB BLD-MCNC: 11.2 G/DL (ref 11.5–15.4)
IMM GRANULOCYTES # BLD AUTO: 0.03 THOUSAND/UL (ref 0–0.2)
IMM GRANULOCYTES NFR BLD AUTO: 0 % (ref 0–2)
LYMPHOCYTES # BLD AUTO: 1.4 THOUSANDS/ÂΜL (ref 0.6–4.47)
LYMPHOCYTES NFR BLD AUTO: 21 % (ref 14–44)
MCH RBC QN AUTO: 31.4 PG (ref 26.8–34.3)
MCHC RBC AUTO-ENTMCNC: 32.4 G/DL (ref 31.4–37.4)
MCV RBC AUTO: 97 FL (ref 82–98)
MONOCYTES # BLD AUTO: 0.47 THOUSAND/ÂΜL (ref 0.17–1.22)
MONOCYTES NFR BLD AUTO: 7 % (ref 4–12)
NEUTROPHILS # BLD AUTO: 4.8 THOUSANDS/ÂΜL (ref 1.85–7.62)
NEUTS SEG NFR BLD AUTO: 70 % (ref 43–75)
NRBC BLD AUTO-RTO: 0 /100 WBCS
PLATELET # BLD AUTO: 169 THOUSANDS/UL (ref 149–390)
PMV BLD AUTO: 10.1 FL (ref 8.9–12.7)
POTASSIUM SERPL-SCNC: 4 MMOL/L (ref 3.5–5.3)
RBC # BLD AUTO: 3.57 MILLION/UL (ref 3.81–5.12)
SODIUM SERPL-SCNC: 136 MMOL/L (ref 135–147)
WBC # BLD AUTO: 6.84 THOUSAND/UL (ref 4.31–10.16)

## 2023-02-24 RX ADMIN — MELATONIN 6 MG: 3 TAB ORAL at 22:07

## 2023-02-24 RX ADMIN — METRONIDAZOLE 500 MG: 500 INJECTION, SOLUTION INTRAVENOUS at 10:09

## 2023-02-24 RX ADMIN — CEFTRIAXONE 1000 MG: 1 INJECTION, SOLUTION INTRAVENOUS at 08:53

## 2023-02-24 RX ADMIN — LISINOPRIL 20 MG: 20 TABLET ORAL at 08:53

## 2023-02-24 RX ADMIN — METRONIDAZOLE 500 MG: 500 INJECTION, SOLUTION INTRAVENOUS at 16:46

## 2023-02-24 RX ADMIN — SODIUM CHLORIDE, SODIUM LACTATE, POTASSIUM CHLORIDE, AND CALCIUM CHLORIDE 50 ML/HR: .6; .31; .03; .02 INJECTION, SOLUTION INTRAVENOUS at 22:07

## 2023-02-24 RX ADMIN — PAROXETINE 40 MG: 20 TABLET, FILM COATED ORAL at 08:53

## 2023-02-24 RX ADMIN — HYDROCHLOROTHIAZIDE 25 MG: 25 TABLET ORAL at 08:52

## 2023-02-24 RX ADMIN — PRAVASTATIN SODIUM 20 MG: 20 TABLET ORAL at 08:53

## 2023-02-24 RX ADMIN — METRONIDAZOLE 500 MG: 500 INJECTION, SOLUTION INTRAVENOUS at 01:05

## 2023-02-24 NOTE — PLAN OF CARE
Problem: PAIN - ADULT  Goal: Verbalizes/displays adequate comfort level or baseline comfort level  Description: Interventions:  - Encourage patient to monitor pain and request assistance  - Assess pain using appropriate pain scale  - Administer analgesics based on type and severity of pain and evaluate response  - Implement non-pharmacological measures as appropriate and evaluate response  - Consider cultural and social influences on pain and pain management  - Notify physician/advanced practitioner if interventions unsuccessful or patient reports new pain  Outcome: Progressing     Problem: INFECTION - ADULT  Goal: Absence or prevention of progression during hospitalization  Description: INTERVENTIONS:  - Assess and monitor for signs and symptoms of infection  - Monitor lab/diagnostic results  - Monitor all insertion sites, i e  indwelling lines, tubes, and drains  - Monitor endotracheal if appropriate and nasal secretions for changes in amount and color  - Devon appropriate cooling/warming therapies per order  - Administer medications as ordered  - Instruct and encourage patient and family to use good hand hygiene technique  - Identify and instruct in appropriate isolation precautions for identified infection/condition  Outcome: Progressing  Goal: Absence of fever/infection during neutropenic period  Description: INTERVENTIONS:  - Monitor WBC    Outcome: Progressing     Problem: SAFETY ADULT  Goal: Patient will remain free of falls  Description: INTERVENTIONS:  - Educate patient/family on patient safety including physical limitations  - Instruct patient to call for assistance with activity   - Consult OT/PT to assist with strengthening/mobility   - Keep Call bell within reach  - Keep bed low and locked with side rails adjusted as appropriate  - Keep care items and personal belongings within reach  - Initiate and maintain comfort rounds  - Make Fall Risk Sign visible to staff  - Apply yellow socks and bracelet for high fall risk patients  - Consider moving patient to room near nurses station  Outcome: Progressing  Goal: Maintain or return to baseline ADL function  Description: INTERVENTIONS:  -  Assess patient's ability to carry out ADLs; assess patient's baseline for ADL function and identify physical deficits which impact ability to perform ADLs (bathing, care of mouth/teeth, toileting, grooming, dressing, etc )  - Assess/evaluate cause of self-care deficits   - Assess range of motion  - Assess patient's mobility; develop plan if impaired  - Assess patient's need for assistive devices and provide as appropriate  - Encourage maximum independence but intervene and supervise when necessary  - Involve family in performance of ADLs  - Assess for home care needs following discharge   - Consider OT consult to assist with ADL evaluation and planning for discharge  - Provide patient education as appropriate  Outcome: Progressing  Goal: Maintains/Returns to pre admission functional level  Description: INTERVENTIONS:  - Perform BMAT or MOVE assessment daily    - Set and communicate daily mobility goal to care team and patient/family/caregiver     - Collaborate with rehabilitation services on mobility goals if consulted  - Dangle patient 3 times a day  - Stand patient 3 times a day  - Ambulate patient 3 times a day  - Out of bed to chair 3 times a day   - Out of bed for meals 3 times a day  - Out of bed for toileting  - Record patient progress and toleration of activity level   Outcome: Progressing     Problem: DISCHARGE PLANNING  Goal: Discharge to home or other facility with appropriate resources  Description: INTERVENTIONS:  - Identify barriers to discharge w/patient and caregiver  - Arrange for needed discharge resources and transportation as appropriate  - Identify discharge learning needs (meds, wound care, etc )  - Arrange for interpretive services to assist at discharge as needed  - Refer to Case Management Department for coordinating discharge planning if the patient needs post-hospital services based on physician/advanced practitioner order or complex needs related to functional status, cognitive ability, or social support system  Outcome: Progressing     Problem: Knowledge Deficit  Goal: Patient/family/caregiver demonstrates understanding of disease process, treatment plan, medications, and discharge instructions  Description: Complete learning assessment and assess knowledge base    Interventions:  - Provide teaching at level of understanding  - Provide teaching via preferred learning methods  Outcome: Progressing     Problem: GASTROINTESTINAL - ADULT  Goal: Minimal or absence of nausea and/or vomiting  Description: INTERVENTIONS:  - Administer IV fluids if ordered to ensure adequate hydration  - Maintain NPO status until nausea and vomiting are resolved  - Nasogastric tube if ordered  - Administer ordered antiemetic medications as needed  - Provide nonpharmacologic comfort measures as appropriate  - Advance diet as tolerated, if ordered  - Consider nutrition services referral to assist patient with adequate nutrition and appropriate food choices  Outcome: Progressing  Goal: Maintains or returns to baseline bowel function  Description: INTERVENTIONS:  - Assess bowel function  - Encourage oral fluids to ensure adequate hydration  - Administer IV fluids if ordered to ensure adequate hydration  - Administer ordered medications as needed  - Encourage mobilization and activity  - Consider nutritional services referral to assist patient with adequate nutrition and appropriate food choices  Outcome: Progressing  Goal: Maintains adequate nutritional intake  Description: INTERVENTIONS:  - Monitor percentage of each meal consumed  - Identify factors contributing to decreased intake, treat as appropriate  - Assist with meals as needed  - Monitor I&O, weight, and lab values if indicated  - Obtain nutrition services referral as needed  Outcome: Progressing

## 2023-02-24 NOTE — PROGRESS NOTES
Progress Note - General Surgery   Susan Thomas 58 y o  female MRN: [id number]  Unit/Bed#: -01 Encounter: [id number]    Assessment:  58 y o  female with upper abdominal discomfort, CT possible inflamed small bowel diverticulum   - Afebrile, VSS on room air  - WBC 6 84 (10 03)  - Hgb stable 11 2 (13 1)  - Lactic Acid 0 7  - Improvement in pain, denies nausea, vomiting     Plan:  - Conservative management  - Advanced to clear liquids this AM  - IVF hydration   - IV abx - Ceftriaxone, Flagyl   - Will monitor labs  - Serial exams  - Pain/nausea control PRN    Subjective:   Patient is feeling improvement this morning  She states she feels so much better than she did yesterday although continues with some discomfort in the epigastric region  She denies any nausea or vomiting  She has been urinating appropriately  Denies flatus or BMs although states she feels she has significantly less bloating than she did on arrival  Patient feels as if she has an appetite and would be interested in trial of a diet this AM  Discussed diet stepwise advancements and patient verbalized understanding  Objective:   Blood pressure 105/69, pulse 79, temperature 98 4 °F (36 9 °C), temperature source Temporal, resp  rate 20, height 5' 4" (1 626 m), weight 73 5 kg (162 lb 0 6 oz), SpO2 96 %  ,Body mass index is 27 81 kg/m²      Intake/Output Summary (Last 24 hours) at 2/24/2023 1881  Last data filed at 2/23/2023 1736  Gross per 24 hour   Intake 1050 ml   Output --   Net 1050 ml     Invasive Devices     Peripheral Intravenous Line  Duration           Peripheral IV 02/23/23 Right Antecubital <1 day              Physical Exam:   General: no acute distress, pt appears well and comfortable, awake in bed  Skin: warm and dry to touch  Pulmonary: normal effort  Abdominal: soft, non-distended, minimally tender in the epigastric region & LUQ, no guarding or rebound   Neuro: alert and oriented     Lab, Imaging and other studies:  I have personally reviewed pertinent lab results    , CBC:   Lab Results   Component Value Date    WBC 6 84 02/24/2023    HGB 11 2 (L) 02/24/2023    HCT 34 6 (L) 02/24/2023    MCV 97 02/24/2023     02/24/2023    MCH 31 4 02/24/2023    MCHC 32 4 02/24/2023    RDW 12 3 02/24/2023    MPV 10 1 02/24/2023    NRBC 0 02/24/2023   CMP:   Lab Results   Component Value Date    SODIUM 136 02/24/2023    K 4 0 02/24/2023     02/24/2023    CO2 31 02/24/2023    BUN 11 02/24/2023    CREATININE 0 65 02/24/2023    CALCIUM 8 5 02/24/2023    AST 19 02/23/2023    ALT 26 02/23/2023    ALKPHOS 44 02/23/2023    EGFR 94 02/24/2023     VTE Pharmacologic Prophylaxis: Reason for no pharmacologic prophylaxis low riskl  VTE Mechanical Prophylaxis: sequential compression device    Virgie Bejarano PA-C  2/24/2023

## 2023-02-24 NOTE — UTILIZATION REVIEW
Initial Clinical Review    Admission: Date/Time/Statement:   Admission Orders (From admission, onward)     Ordered        02/23/23 1728  Inpatient Admission  Once                      Orders Placed This Encounter   Procedures   • Inpatient Admission     Standing Status:   Standing     Number of Occurrences:   1     Order Specific Question:   Level of Care     Answer:   Med Surg [16]     Order Specific Question:   Estimated length of stay     Answer:   More than 2 Midnights     Order Specific Question:   Certification     Answer:   I certify that inpatient services are medically necessary for this patient for a duration of greater than two midnights  See H&P and MD Progress Notes for additional information about the patient's course of treatment  ED Arrival Information     Expected   2/23/2023 14:43    Arrival   2/23/2023 15:22    Acuity   Urgent            Means of arrival   Walk-In    Escorted by   Self    Service   Surgery-General    Admission type   Emergency            Arrival complaint   lower abd pain           Chief Complaint   Patient presents with   • Abdominal Pain     Mid epigastric pain for past couple days, seen at urgent care today and sent in to the hospital for further evaluation  Took maalox last night without relief       Initial Presentation: 58 y o  female to ED via walk in from home  Presents with epigastric abdominal pain radiating into the left side of the abdomen has been ongoing for about 3 days now and gradually worsening over the past 2 days  PMHX: anxiety; diverticulitis; HTN; High cholesterol  Admitted to M/S with DX: Small bowel diverticular disease  on exam: abdominal tenderness (epigastric and left side); Pain 5-6 / 10;   CT = show inflamed small bowel diverticulum  Given in ED: IVF Bolus 1L; Toradol Iv x 1; Ancef iv; Flagyl iv  PLAN: cont iv abx; serial abd exams; pain control; cont ivf @ 125; monitor labs; NPO    Date: 2/25/23    Day 2  Cont with abdominal pain; Pain 3/10;  AG 1; H/H 11 2 / 34 6  Plan: cont iv abx; serial abd exams; pain control; cont ivf @ 125; monitor labs; adv diet to clear liq diet         ED Triage Vitals [02/23/23 1525]   Temperature Pulse Respirations Blood Pressure SpO2   (!) 97 °F (36 1 °C) 89 16 129/77 99 %      Temp Source Heart Rate Source Patient Position - Orthostatic VS BP Location FiO2 (%)   Temporal Monitor Sitting Left arm --      Pain Score       5          Wt Readings from Last 1 Encounters:   02/23/23 73 5 kg (162 lb 0 6 oz)     Additional Vital Signs:   Date/Time Temp Pulse Resp BP MAP (mmHg) SpO2 O2 Device Patient Position - Orthostatic VS   02/24/23 08:41:34 98 1 °F (36 7 °C) 64 16 123/66 85 95 % -- --   02/23/23 22:25:30 98 4 °F (36 9 °C) 79 20 105/69 81 96 % None (Room air) Lying   02/23/23 18:19:36 98 1 °F (36 7 °C) 78 20 125/73 90 97 % -- --   02/23/23 1745 -- 77 -- 110/58 79 97 % -- --   02/23/23 1645 -- 83 -- -- -- 96 % -- --   02/23/23 1525 97 °F (36 1 °C) Abnormal  89 16 129/77 -- 99 % None (Room air) Sitting         EKG:Normal sinus rhythm  Normal ECG      Pertinent Labs/Diagnostic Test Results:   CT abdomen pelvis with contrast   Final Result by Ruy Amaro MD (02/23 1706)      Findings suggest an inflamed small bowel diverticulum with probable developing adjacent collection as well as edema in the mesentery  This unusual small bowel diverticulum could represent a Meckel's diverticulum although somewhat more usual in this age    group  Other diverticula appear to be present more superiorly in the left upper quadrant making Meckel's less likely  Surgical consultation is advised  There is focal dilatation of one small bowel loop which may be related to ileus  A complete volvulus of this loop with closed loop configuration at this time is not visualized although there does appear to be some partial twisting of the mesentery in    this area  Partial obstruction is not excluded        There is left colon diverticulosis seen elsewhere without diverticulitis        The appendix is normal       This was discussed with Dr Amber Spaulding at 4:50 PM            Workstation performed: [id number]               Results from last 7 days   Lab Units 02/24/23  0507 02/23/23  1544   WBC Thousand/uL 6 84 10 03   HEMOGLOBIN g/dL 11 2* 13 1   HEMATOCRIT % 34 6* 39 6   PLATELETS Thousands/uL 169 230   NEUTROS ABS Thousands/µL 4 80 6 81         Results from last 7 days   Lab Units 02/24/23  0507 02/23/23  1544   SODIUM mmol/L 136 137   POTASSIUM mmol/L 4 0 3 6   CHLORIDE mmol/L 104 100   CO2 mmol/L 31 32   ANION GAP mmol/L 1* 5   BUN mg/dL 11 9   CREATININE mg/dL 0 65 0 69   EGFR ml/min/1 73sq m 94 92   CALCIUM mg/dL 8 5 9 0     Results from last 7 days   Lab Units 02/23/23  1544   AST U/L 19   ALT U/L 26   ALK PHOS U/L 44   TOTAL PROTEIN g/dL 7 4   ALBUMIN g/dL 4 1   TOTAL BILIRUBIN mg/dL 0 40         Results from last 7 days   Lab Units 02/24/23  0507 02/23/23  1544   GLUCOSE RANDOM mg/dL 119 93       Results from last 7 days   Lab Units 02/23/23  1544   PROTIME seconds 13 2   INR  0 99   PTT seconds 27       Results from last 7 days   Lab Units 02/23/23  1544   LACTIC ACID mmol/L 0 7       Results from last 7 days   Lab Units 02/23/23  1544   LIPASE u/L 12       Results from last 7 days   Lab Units 02/23/23  1543   CLARITY UA  Clear   COLOR UA  Yellow   SPEC GRAV UA  1 010   PH UA  7 5   GLUCOSE UA mg/dl Negative   KETONES UA mg/dl Negative   BLOOD UA  Negative   PROTEIN UA mg/dl Negative   NITRITE UA  Negative   BILIRUBIN UA  Negative   UROBILINOGEN UA E U /dl 0 2   LEUKOCYTES UA  Small*   WBC UA /hpf 4-10*   RBC UA /hpf 1-2   BACTERIA UA /hpf Occasional   EPITHELIAL CELLS WET PREP /hpf Occasional     ED Treatment:   Medication Administration from 02/23/2023 1442 to 02/23/2023 1813       Date/Time Order Dose Route Action     02/23/2023 1709 EST sodium chloride 0 9 % bolus 1,000 mL 0 mL Intravenous Stopped     02/23/2023 1547 EST sodium chloride 0 9 % bolus 1,000 mL 1,000 mL Intravenous New Bag     02/23/2023 1545 EST ketorolac (TORADOL) injection 30 mg 30 mg Intravenous Given     02/23/2023 1632 EST iohexol (OMNIPAQUE) 350 MG/ML injection (SINGLE-DOSE) 85 mL 85 mL Intravenous Given     02/23/2023 1736 EST ceFAZolin (ANCEF) IVPB (premix in dextrose) 1,000 mg 50 mL 0 mg Intravenous Stopped     02/23/2023 1709 EST ceFAZolin (ANCEF) IVPB (premix in dextrose) 1,000 mg 50 mL 1,000 mg Intravenous New Bag     02/23/2023 1739 EST metroNIDAZOLE (FLAGYL) IVPB (premix) 500 mg 100 mL 500 mg Intravenous New Bag        Admitting Diagnosis: Abdominal pain [R10 9]  Diverticulitis, intestine, small [K57 12]  Age/Sex: 58 y o  female     Admission Orders: SCD's; Clear liq    Scheduled Medications:  cefTRIAXone, 1,000 mg, Intravenous, Q24H  lisinopril, 20 mg, Oral, Daily   And  hydrochlorothiazide, 25 mg, Oral, Daily  metroNIDAZOLE, 500 mg, Intravenous, Q8H  PARoxetine, 40 mg, Oral, QAM  pravastatin, 20 mg, Oral, Daily      Continuous IV Infusions:  lactated ringers, 125 mL/hr, Intravenous, Continuous      PRN Meds:  acetaminophen, 650 mg, Oral, Q6H PRN  ketorolac, 15 mg, Intravenous, Q6H PRN (2/23 rec'd x 1)   melatonin, 6 mg, Oral, HS PRN  morphine injection, 4 mg, Intravenous, Q4H PRN(2/23 rec'd x 1)   ondansetron, 4 mg, Intravenous, Q6H PRN        Network Utilization Review Department  ATTENTION: Please call with any questions or concerns to [phone] and carefully listen to the prompts so that you are directed to the right person  All voicemails are confidential   Florentin Oar all requests for admission clinical reviews, approved or denied determinations and any other requests to dedicated fax number below belonging to the campus where the patient is receiving treatment   List of dedicated fax numbers for the Facilities:  [address] [place] [address] DENIALS (Administrative/Medical Necessity) [phone]   [address] [place] [address] (Maternity/NICU/Pediatrics) [phone]     [address]  [address] [phone]   [address] [place] [address] [address] Simon 28 [facility] 310 [facility] 134 815 [facility] [phone]

## 2023-02-24 NOTE — UTILIZATION REVIEW
NOTIFICATION OF INPATIENT ADMISSION   AUTHORIZATION REQUEST   SERVICING FACILITY:   [address] [place], [place] [address]kristin Vidal [address] [place] [address], [address]  Tax ID: [id number]  NPI: [id number] ATTENDING PROVIDER:  Attending Name and NPI#:   Address: Carole Vidal [address] [place] [address], Monique Harmon  Phone: [phone]   ADMISSION INFORMATION:  Place of Service: [facility] [address]  Place of Service Code: 21  Inpatient Admission Date/Time: 2/23/23  5:28 PM  Discharge Date/Time: No discharge date for patient encounter  Admitting Diagnosis Code/Description:  Abdominal pain [R10 9]  Diverticulitis, intestine, small [K57 12]     UTILIZATION REVIEW CONTACT:  Toro Ann Utilization   Network Utilization Review Department  Phone: [phone]  Fax [phone]  Email: [email or web address]  [email or web address]  Contact for approvals/pending authorizations, clinical reviews, and discharge  PHYSICIAN ADVISORY SERVICES:  Medical Necessity Denial & Cpko-sb[facility]Yjat Review  Phone: [phone]  Fax: [phone]  Email: [email or web address]

## 2023-02-24 NOTE — PLAN OF CARE
Problem: PAIN - ADULT  Goal: Verbalizes/displays adequate comfort level or baseline comfort level  Description: Interventions:  - Encourage patient to monitor pain and request assistance  - Assess pain using appropriate pain scale  - Administer analgesics based on type and severity of pain and evaluate response  - Implement non-pharmacological measures as appropriate and evaluate response  - Consider cultural and social influences on pain and pain management  - Notify physician/advanced practitioner if interventions unsuccessful or patient reports new pain  Outcome: Progressing     Problem: INFECTION - ADULT  Goal: Absence or prevention of progression during hospitalization  Description: INTERVENTIONS:  - Assess and monitor for signs and symptoms of infection  - Monitor lab/diagnostic results  - Monitor all insertion sites, i e  indwelling lines, tubes, and drains  - Monitor endotracheal if appropriate and nasal secretions for changes in amount and color  - [place] appropriate cooling/warming therapies per order  - Administer medications as ordered  - Instruct and encourage patient and family to use good hand hygiene technique  - Identify and instruct in appropriate isolation precautions for identified infection/condition  Outcome: Progressing  Goal: Absence of fever/infection during neutropenic period  Description: INTERVENTIONS:  - Monitor WBC    Outcome: Progressing     Problem: SAFETY ADULT  Goal: Patient will remain free of falls  Description: INTERVENTIONS:  - Educate patient/family on patient safety including physical limitations  - Instruct patient to call for assistance with activity   - Consult OT/PT to assist with strengthening/mobility   - Keep Call bell within reach  - Keep bed low and locked with side rails adjusted as appropriate  - Keep care items and personal belongings within reach  - Initiate and maintain comfort rounds  - Make Fall Risk Sign visible to staff  - Offer Toileting every 2 Hours, in advance of need  - Apply yellow socks and bracelet for high fall risk patients  - Consider moving patient to room near nurses station  Outcome: Progressing  Goal: Maintain or return to baseline ADL function  Description: INTERVENTIONS:  -  Assess patient's ability to carry out ADLs; assess patient's baseline for ADL function and identify physical deficits which impact ability to perform ADLs (bathing, care of mouth/teeth, toileting, grooming, dressing, etc )  - Assess/evaluate cause of self-care deficits   - Assess range of motion  - Assess patient's mobility; develop plan if impaired  - Assess patient's need for assistive devices and provide as appropriate  - Encourage maximum independence but intervene and supervise when necessary  - Involve family in performance of ADLs  - Assess for home care needs following discharge   - Consider OT consult to assist with ADL evaluation and planning for discharge  - Provide patient education as appropriate  Outcome: Progressing  Goal: Maintains/Returns to pre admission functional level  Description: INTERVENTIONS:  - Perform BMAT or MOVE assessment daily    - Set and communicate daily mobility goal to care team and patient/family/caregiver     - Collaborate with rehabilitation services on mobility goals if consulted  - Out of bed for toileting  - Record patient progress and toleration of activity level   Outcome: Progressing     Problem: DISCHARGE PLANNING  Goal: Discharge to home or other facility with appropriate resources  Description: INTERVENTIONS:  - Identify barriers to discharge w/patient and caregiver  - Arrange for needed discharge resources and transportation as appropriate  - Identify discharge learning needs (meds, wound care, etc )  - Arrange for interpretive services to assist at discharge as needed  - Refer to Case Management Department for coordinating discharge planning if the patient needs post-hospital services based on physician/advanced practitioner order or complex needs related to functional status, cognitive ability, or social support system  Outcome: Progressing     Problem: Knowledge Deficit  Goal: Patient/family/caregiver demonstrates understanding of disease process, treatment plan, medications, and discharge instructions  Description: Complete learning assessment and assess knowledge base    Interventions:  - Provide teaching at level of understanding  - Provide teaching via preferred learning methods  Outcome: Progressing

## 2023-02-25 VITALS
BODY MASS INDEX: 27.66 KG/M2 | WEIGHT: 162.04 LBS | RESPIRATION RATE: 16 BRPM | OXYGEN SATURATION: 96 % | HEIGHT: 64 IN | TEMPERATURE: 98.2 F | HEART RATE: 69 BPM | DIASTOLIC BLOOD PRESSURE: 70 MMHG | SYSTOLIC BLOOD PRESSURE: 126 MMHG

## 2023-02-25 LAB
ANION GAP SERPL CALCULATED.3IONS-SCNC: 1 MMOL/L (ref 4–13)
BASOPHILS # BLD AUTO: 0.02 THOUSANDS/ÂΜL (ref 0–0.1)
BASOPHILS NFR BLD AUTO: 0 % (ref 0–1)
BUN SERPL-MCNC: 8 MG/DL (ref 5–25)
CALCIUM SERPL-MCNC: 8.9 MG/DL (ref 8.4–10.2)
CHLORIDE SERPL-SCNC: 107 MMOL/L (ref 96–108)
CO2 SERPL-SCNC: 33 MMOL/L (ref 21–32)
CREAT SERPL-MCNC: 0.64 MG/DL (ref 0.6–1.3)
EOSINOPHIL # BLD AUTO: 0.17 THOUSAND/ÂΜL (ref 0–0.61)
EOSINOPHIL NFR BLD AUTO: 3 % (ref 0–6)
ERYTHROCYTE [DISTWIDTH] IN BLOOD BY AUTOMATED COUNT: 12.2 % (ref 11.6–15.1)
GFR SERPL CREATININE-BSD FRML MDRD: 94 ML/MIN/1.73SQ M
GLUCOSE SERPL-MCNC: 98 MG/DL (ref 65–140)
HCT VFR BLD AUTO: 35.2 % (ref 34.8–46.1)
HGB BLD-MCNC: 11.6 G/DL (ref 11.5–15.4)
IMM GRANULOCYTES # BLD AUTO: 0.01 THOUSAND/UL (ref 0–0.2)
IMM GRANULOCYTES NFR BLD AUTO: 0 % (ref 0–2)
LYMPHOCYTES # BLD AUTO: 2.22 THOUSANDS/ÂΜL (ref 0.6–4.47)
LYMPHOCYTES NFR BLD AUTO: 42 % (ref 14–44)
MCH RBC QN AUTO: 32 PG (ref 26.8–34.3)
MCHC RBC AUTO-ENTMCNC: 33 G/DL (ref 31.4–37.4)
MCV RBC AUTO: 97 FL (ref 82–98)
MONOCYTES # BLD AUTO: 0.4 THOUSAND/ÂΜL (ref 0.17–1.22)
MONOCYTES NFR BLD AUTO: 8 % (ref 4–12)
NEUTROPHILS # BLD AUTO: 2.52 THOUSANDS/ÂΜL (ref 1.85–7.62)
NEUTS SEG NFR BLD AUTO: 47 % (ref 43–75)
NRBC BLD AUTO-RTO: 0 /100 WBCS
PLATELET # BLD AUTO: 194 THOUSANDS/UL (ref 149–390)
PMV BLD AUTO: 10.1 FL (ref 8.9–12.7)
POTASSIUM SERPL-SCNC: 3.8 MMOL/L (ref 3.5–5.3)
RBC # BLD AUTO: 3.62 MILLION/UL (ref 3.81–5.12)
SODIUM SERPL-SCNC: 141 MMOL/L (ref 135–147)
WBC # BLD AUTO: 5.34 THOUSAND/UL (ref 4.31–10.16)

## 2023-02-25 RX ORDER — CIPROFLOXACIN 500 MG/1
500 TABLET, FILM COATED ORAL EVERY 12 HOURS SCHEDULED
Qty: 14 TABLET | Refills: 0 | Status: SHIPPED | OUTPATIENT
Start: 2023-02-25 | End: 2023-03-04

## 2023-02-25 RX ORDER — ACETAMINOPHEN 325 MG/1
650 TABLET ORAL EVERY 6 HOURS PRN
Refills: 0
Start: 2023-02-25

## 2023-02-25 RX ORDER — METRONIDAZOLE 500 MG/1
500 TABLET ORAL EVERY 8 HOURS SCHEDULED
Qty: 21 TABLET | Refills: 0 | Status: SHIPPED | OUTPATIENT
Start: 2023-02-25 | End: 2023-03-04

## 2023-02-25 RX ADMIN — PAROXETINE 40 MG: 20 TABLET, FILM COATED ORAL at 08:40

## 2023-02-25 RX ADMIN — PRAVASTATIN SODIUM 20 MG: 20 TABLET ORAL at 08:40

## 2023-02-25 RX ADMIN — LISINOPRIL 20 MG: 20 TABLET ORAL at 08:40

## 2023-02-25 RX ADMIN — METRONIDAZOLE 500 MG: 500 INJECTION, SOLUTION INTRAVENOUS at 01:13

## 2023-02-25 RX ADMIN — HYDROCHLOROTHIAZIDE 25 MG: 25 TABLET ORAL at 08:40

## 2023-02-25 NOTE — PLAN OF CARE
Problem: PAIN - ADULT  Goal: Verbalizes/displays adequate comfort level or baseline comfort level  Description: Interventions:  - Encourage patient to monitor pain and request assistance  - Assess pain using appropriate pain scale  - Administer analgesics based on type and severity of pain and evaluate response  - Implement non-pharmacological measures as appropriate and evaluate response  - Consider cultural and social influences on pain and pain management  - Notify physician/advanced practitioner if interventions unsuccessful or patient reports new pain  Outcome: Progressing     Problem: INFECTION - ADULT  Goal: Absence or prevention of progression during hospitalization  Description: INTERVENTIONS:  - Assess and monitor for signs and symptoms of infection  - Monitor lab/diagnostic results  - Monitor all insertion sites, i e  indwelling lines, tubes, and drains  - Monitor endotracheal if appropriate and nasal secretions for changes in amount and color  - [place] appropriate cooling/warming therapies per order  - Administer medications as ordered  - Instruct and encourage patient and family to use good hand hygiene technique  - Identify and instruct in appropriate isolation precautions for identified infection/condition  Outcome: Progressing  Goal: Absence of fever/infection during neutropenic period  Description: INTERVENTIONS:  - Monitor WBC    Outcome: Progressing     Problem: SAFETY ADULT  Goal: Patient will remain free of falls  Description: INTERVENTIONS:  - Educate patient/family on patient safety including physical limitations  - Instruct patient to call for assistance with activity   - Consult OT/PT to assist with strengthening/mobility   - Keep Call bell within reach  - Keep bed low and locked with side rails adjusted as appropriate  - Keep care items and personal belongings within reach  - Initiate and maintain comfort rounds  - Make Fall Risk Sign visible to staff  - Apply yellow socks and bracelet for high fall risk patients  - Consider moving patient to room near nurses station  Outcome: Progressing  Goal: Maintain or return to baseline ADL function  Description: INTERVENTIONS:  -  Assess patient's ability to carry out ADLs; assess patient's baseline for ADL function and identify physical deficits which impact ability to perform ADLs (bathing, care of mouth/teeth, toileting, grooming, dressing, etc )  - Assess/evaluate cause of self-care deficits   - Assess range of motion  - Assess patient's mobility; develop plan if impaired  - Assess patient's need for assistive devices and provide as appropriate  - Encourage maximum independence but intervene and supervise when necessary  - Involve family in performance of ADLs  - Assess for home care needs following discharge   - Consider OT consult to assist with ADL evaluation and planning for discharge  - Provide patient education as appropriate  Outcome: Progressing  Goal: Maintains/Returns to pre admission functional level  Description: INTERVENTIONS:  - Perform BMAT or MOVE assessment daily    - Set and communicate daily mobility goal to care team and patient/family/caregiver     - Collaborate with rehabilitation services on mobility goals if consulted  - Out of bed for toileting  - Record patient progress and toleration of activity level   Outcome: Progressing     Problem: DISCHARGE PLANNING  Goal: Discharge to home or other facility with appropriate resources  Description: INTERVENTIONS:  - Identify barriers to discharge w/patient and caregiver  - Arrange for needed discharge resources and transportation as appropriate  - Identify discharge learning needs (meds, wound care, etc )  - Arrange for interpretive services to assist at discharge as needed  - Refer to Case Management Department for coordinating discharge planning if the patient needs post-hospital services based on physician/advanced practitioner order or complex needs related to functional status, cognitive ability, or social support system  Outcome: Progressing     Problem: Knowledge Deficit  Goal: Patient/family/caregiver demonstrates understanding of disease process, treatment plan, medications, and discharge instructions  Description: Complete learning assessment and assess knowledge base    Interventions:  - Provide teaching at level of understanding  - Provide teaching via preferred learning methods  Outcome: Progressing     Problem: GASTROINTESTINAL - ADULT  Goal: Minimal or absence of nausea and/or vomiting  Description: INTERVENTIONS:  - Administer IV fluids if ordered to ensure adequate hydration  - Maintain NPO status until nausea and vomiting are resolved  - Nasogastric tube if ordered  - Administer ordered antiemetic medications as needed  - Provide nonpharmacologic comfort measures as appropriate  - Advance diet as tolerated, if ordered  - Consider nutrition services referral to assist patient with adequate nutrition and appropriate food choices  Outcome: Progressing  Goal: Maintains or returns to baseline bowel function  Description: INTERVENTIONS:  - Assess bowel function  - Encourage oral fluids to ensure adequate hydration  - Administer IV fluids if ordered to ensure adequate hydration  - Administer ordered medications as needed  - Encourage mobilization and activity  - Consider nutritional services referral to assist patient with adequate nutrition and appropriate food choices  Outcome: Progressing  Goal: Maintains adequate nutritional intake  Description: INTERVENTIONS:  - Monitor percentage of each meal consumed  - Identify factors contributing to decreased intake, treat as appropriate  - Assist with meals as needed  - Monitor I&O, weight, and lab values if indicated  - Obtain nutrition services referral as needed  Outcome: Progressing

## 2023-02-25 NOTE — NURSING NOTE
Reviewed AVS with patient  Discussed current and new medications, signs and symptoms to monitor for, and diet  Patient verbalized understanding and had no questions  IV catheter removed and intact   Patient escorted out by RN

## 2023-02-25 NOTE — PLAN OF CARE
Problem: PAIN - ADULT  Goal: Verbalizes/displays adequate comfort level or baseline comfort level  Description: Interventions:  - Encourage patient to monitor pain and request assistance  - Assess pain using appropriate pain scale  - Administer analgesics based on type and severity of pain and evaluate response  - Implement non-pharmacological measures as appropriate and evaluate response  - Consider cultural and social influences on pain and pain management  - Notify physician/advanced practitioner if interventions unsuccessful or patient reports new pain  Outcome: Progressing     Problem: INFECTION - ADULT  Goal: Absence or prevention of progression during hospitalization  Description: INTERVENTIONS:  - Assess and monitor for signs and symptoms of infection  - Monitor lab/diagnostic results  - Monitor all insertion sites, i e  indwelling lines, tubes, and drains  - Monitor endotracheal if appropriate and nasal secretions for changes in amount and color  - [place] appropriate cooling/warming therapies per order  - Administer medications as ordered  - Instruct and encourage patient and family to use good hand hygiene technique  - Identify and instruct in appropriate isolation precautions for identified infection/condition  Outcome: Progressing  Goal: Absence of fever/infection during neutropenic period  Description: INTERVENTIONS:  - Monitor WBC    Outcome: Progressing     Problem: SAFETY ADULT  Goal: Patient will remain free of falls  Description: INTERVENTIONS:  - Educate patient/family on patient safety including physical limitations  - Instruct patient to call for assistance with activity   - Consult OT/PT to assist with strengthening/mobility   - Keep Call bell within reach  - Keep bed low and locked with side rails adjusted as appropriate  - Keep care items and personal belongings within reach  - Initiate and maintain comfort rounds  - Make Fall Risk Sign visible to staff  - Apply yellow socks and bracelet for high fall risk patients  - Consider moving patient to room near nurses station  Outcome: Progressing  Goal: Maintain or return to baseline ADL function  Description: INTERVENTIONS:  -  Assess patient's ability to carry out ADLs; assess patient's baseline for ADL function and identify physical deficits which impact ability to perform ADLs (bathing, care of mouth/teeth, toileting, grooming, dressing, etc )  - Assess/evaluate cause of self-care deficits   - Assess range of motion  - Assess patient's mobility; develop plan if impaired  - Assess patient's need for assistive devices and provide as appropriate  - Encourage maximum independence but intervene and supervise when necessary  - Involve family in performance of ADLs  - Assess for home care needs following discharge   - Consider OT consult to assist with ADL evaluation and planning for discharge  - Provide patient education as appropriate  Outcome: Progressing  Goal: Maintains/Returns to pre admission functional level  Description: INTERVENTIONS:  - Perform BMAT or MOVE assessment daily    - Set and communicate daily mobility goal to care team and patient/family/caregiver     - Collaborate with rehabilitation services on mobility goals if consulted  - Out of bed for toileting  - Record patient progress and toleration of activity level   Outcome: Progressing     Problem: DISCHARGE PLANNING  Goal: Discharge to home or other facility with appropriate resources  Description: INTERVENTIONS:  - Identify barriers to discharge w/patient and caregiver  - Arrange for needed discharge resources and transportation as appropriate  - Identify discharge learning needs (meds, wound care, etc )  - Arrange for interpretive services to assist at discharge as needed  - Refer to Case Management Department for coordinating discharge planning if the patient needs post-hospital services based on physician/advanced practitioner order or complex needs related to functional status, cognitive ability, or social support system  Outcome: Progressing     Problem: Knowledge Deficit  Goal: Patient/family/caregiver demonstrates understanding of disease process, treatment plan, medications, and discharge instructions  Description: Complete learning assessment and assess knowledge base    Interventions:  - Provide teaching at level of understanding  - Provide teaching via preferred learning methods  Outcome: Progressing     Problem: GASTROINTESTINAL - ADULT  Goal: Minimal or absence of nausea and/or vomiting  Description: INTERVENTIONS:  - Administer IV fluids if ordered to ensure adequate hydration  - Maintain NPO status until nausea and vomiting are resolved  - Nasogastric tube if ordered  - Administer ordered antiemetic medications as needed  - Provide nonpharmacologic comfort measures as appropriate  - Advance diet as tolerated, if ordered  - Consider nutrition services referral to assist patient with adequate nutrition and appropriate food choices  Outcome: Progressing  Goal: Maintains or returns to baseline bowel function  Description: INTERVENTIONS:  - Assess bowel function  - Encourage oral fluids to ensure adequate hydration  - Administer IV fluids if ordered to ensure adequate hydration  - Administer ordered medications as needed  - Encourage mobilization and activity  - Consider nutritional services referral to assist patient with adequate nutrition and appropriate food choices  Outcome: Progressing  Goal: Maintains adequate nutritional intake  Description: INTERVENTIONS:  - Monitor percentage of each meal consumed  - Identify factors contributing to decreased intake, treat as appropriate  - Assist with meals as needed  - Monitor I&O, weight, and lab values if indicated  - Obtain nutrition services referral as needed  Outcome: Progressing

## 2023-02-25 NOTE — DISCHARGE SUMMARY
Discharge Summary - Manuela Quevedo 58 y o  female MRN: [id number]    Unit/Bed#: -01 Encounter: [id number]    Admission Date:   Admission Orders (From admission, onward)     Ordered        23 0900  Inpatient Admission  Once            23 1728  Inpatient Admission  Once                        Admitting Diagnosis: Abdominal pain [R10 9]  Diverticulitis, intestine, small [K57 12]    HPI: Manuela Quevedo is a 58 y o  female who presents with abdominal pain  The patient states she has had 4 days of upper abdominal pain  She denies any nausea or vomiting  She has not had any fevers  The patient has had diverticulitis in the past however, she states today's pain is different  She has never had a pain like this in the past   The patient's only abdominal surgical history includes a        Procedures Performed: No orders of the defined types were placed in this encounter  Summary of Hospital Course: The patient presents to the hospital due to abdominal pain  She was found to have findings consistent with a diverticulitis of the small bowel on CT scan  The patient did not have any peritoneal signs  Conservative management was recommended  This included bowel rest, IV fluids, and IV antibiotics  The patient responded well to this treatment  Her diet was able to be advanced slowly without difficulty  The patient's pain improved throughout the hospitalization  Once the patient's pain was minimal, she was tolerating a diet without difficulty, and she was afebrile, the patient was discharged home on oral antibiotics      Significant Findings, Care, Treatment and Services Provided: Diverticulitis of the small bowel    Complications: None    Discharge Diagnosis: Small bowel diverticulitis    Medical Problems     Resolved Problems  Date Reviewed: 2023   None         Condition at Discharge: good         Discharge instructions/Information to patient and family:   See after visit summary for information provided to patient and family  Provisions for Follow-Up Care:  See after visit summary for information related to follow-up care and any pertinent home health orders  PCP: Lala Bess MD    Disposition: Home    Planned Readmission: No      Discharge Statement   I spent 25 minutes discharging the patient  This time was spent on the day of discharge  I had direct contact with the patient on the day of discharge  Additional documentation is required if more than 30 minutes were spent on discharge  Discharge Medications:  See after visit summary for reconciled discharge medications provided to patient and family

## 2023-02-25 NOTE — DISCHARGE INSTR - AVS FIRST PAGE
General surgery discharge instructions    Please call [facility] with any questions or concerns [phone]    2 antibiotics have been prescribed for 1 week, please take as directed on the bottle    Take Tylenol or ibuprofen as needed for pain    Resume all home medications as you were taking before    Diet as tolerated    If you develop severe abdominal pain, nausea, vomiting, or fever call or present to the hospital    You will have a follow-up appointment with our office in approximately 2 weeks    If you do not hear from our office, call number above

## 2023-02-25 NOTE — PROGRESS NOTES
Progress Note - General Surgery   Mounika Catching 58 y o  female MRN: [id number]  Unit/Bed#: -[facility] Encounter: [id number]    Assessment:  58 y o  female with upper abdominal discomfort, CT possible inflamed small bowel diverticulum   - Afebrile, VSS on room air  - WBC normal  - Hgb stable    -Symptoms resolved, tolerating diet    Plan:  Diet advance to surgical soft this morning  The patient is tolerating well  Discontinue fluids  Discharge home today with continued antibiotic treatment  Patient may shower    Subjective:   Patient is feeling well this morning  She denies any abdominal pain  She has not had any nausea or vomiting  She is tolerating a diet without difficulty  Positive small bowel movement yesterday  Afebrile  Objective:   Blood pressure 110/66, pulse 69, temperature 98 2 °F (36 8 °C), resp  rate 16, height 5' 4" (1 626 m), weight 73 5 kg (162 lb 0 6 oz), SpO2 96 %  ,Body mass index is 27 81 kg/m²  Intake/Output Summary (Last 24 hours) at 2/25/2023 3629  Last data filed at 2/25/2023 0133  Gross per 24 hour   Intake 1591 67 ml   Output --   Net 1591 67 ml     Invasive Devices     Peripheral Intravenous Line  Duration           Peripheral IV 02/23/23 Right Antecubital 1 day              Physical Exam:   General: no acute distress, pt appears well and comfortable, awake in bed  Skin: warm and dry to touch  Pulmonary: normal effort  Abdominal: soft, non-distended, minimally tender in the epigastric region & LUQ, no guarding or rebound   Neuro: alert and oriented     Lab, Imaging and other studies:  I have personally reviewed pertinent lab results    , CBC:   Lab Results   Component Value Date    WBC 5 34 02/25/2023    HGB 11 6 02/25/2023    HCT 35 2 02/25/2023    MCV 97 02/25/2023     02/25/2023    MCH 32 0 02/25/2023    MCHC 33 0 02/25/2023    RDW 12 2 02/25/2023    MPV 10 1 02/25/2023    NRBC 0 02/25/2023   CMP:   Lab Results   Component Value Date    SODIUM 141 02/25/2023    K 3 8 02/25/2023     02/25/2023    CO2 33 (H) 02/25/2023    BUN 8 02/25/2023    CREATININE 0 64 02/25/2023    CALCIUM 8 9 02/25/2023    EGFR 94 02/25/2023     VTE Pharmacologic Prophylaxis: Reason for no pharmacologic prophylaxis low riskl  VTE Mechanical Prophylaxis: sequential compression device    Rayfield Krabbe, PA-C  2/25/2023

## 2023-02-27 NOTE — UTILIZATION REVIEW
NOTIFICATION OF ADMISSION DISCHARGE   This is a Notification of Discharge from [facility]  Please be advised that this patient has been discharge from our facility  Below you will find the admission and discharge date and time including the patient’s disposition  UTILIZATION REVIEW CONTACT:  [address] [place]  Utilization   Network Utilization Review Department  Phone: [phone] x carefully listen to the prompts  All voicemails are confidential   Email: [email or web address]     ADMISSION INFORMATION  PRESENTATION DATE: 2/23/2023  3:27 PM  OBERVATION ADMISSION DATE:   INPATIENT ADMISSION DATE: 2/23/23  5:28 PM   DISCHARGE DATE: 2/25/2023 10:29 AM   DISPOSITION:Home/Self Care    IMPORTANT INFORMATION:  Send all requests for admission clinical reviews, approved or denied determinations and any other requests to dedicated fax number below belonging to the campus where the patient is receiving treatment   List of dedicated fax numbers:  [address] DENIALS (Administrative/Medical Necessity) [phone]   [address] (Maternity/NICU/Pediatrics) [phone]   [facility] [phone]   [place] [address] [phone]   [address] [phone]   220 [facility] [phone] 90 [facility] [phone]   [address] [place] [address] [phone]   [facility]  [phone]   [address] [facility] [phone]   412 [facility] [address] [facility] [phone]

## 2024-02-21 PROBLEM — U07.1 ACUTE RESPIRATORY DISEASE DUE TO COVID-19 VIRUS: Status: RESOLVED | Noted: 2020-04-02 | Resolved: 2024-02-21

## 2024-02-21 PROBLEM — J06.9 ACUTE RESPIRATORY DISEASE DUE TO COVID-19 VIRUS: Status: RESOLVED | Noted: 2020-04-02 | Resolved: 2024-02-21

## 2024-05-17 ENCOUNTER — OFFICE VISIT (OUTPATIENT)
Dept: OBGYN CLINIC | Facility: CLINIC | Age: 64
End: 2024-05-17
Payer: COMMERCIAL

## 2024-05-17 VITALS
OXYGEN SATURATION: 96 % | WEIGHT: 151.8 LBS | BODY MASS INDEX: 26.06 KG/M2 | DIASTOLIC BLOOD PRESSURE: 94 MMHG | TEMPERATURE: 98.1 F | SYSTOLIC BLOOD PRESSURE: 150 MMHG | HEART RATE: 73 BPM

## 2024-05-17 DIAGNOSIS — M77.8 TENDINITIS OF RIGHT WRIST: ICD-10-CM

## 2024-05-17 DIAGNOSIS — M65.4 TENOSYNOVITIS, DE QUERVAIN: Primary | ICD-10-CM

## 2024-05-17 PROCEDURE — 20550 NJX 1 TENDON SHEATH/LIGAMENT: CPT | Performed by: ORTHOPAEDIC SURGERY

## 2024-05-17 PROCEDURE — 99213 OFFICE O/P EST LOW 20 MIN: CPT | Performed by: ORTHOPAEDIC SURGERY

## 2024-05-17 RX ORDER — BUPIVACAINE HYDROCHLORIDE 2.5 MG/ML
1 INJECTION, SOLUTION INFILTRATION; PERINEURAL
Status: COMPLETED | OUTPATIENT
Start: 2024-05-17 | End: 2024-05-17

## 2024-05-17 RX ORDER — PAROXETINE 30 MG/1
30 TABLET, FILM COATED ORAL EVERY MORNING
COMMUNITY
Start: 2024-04-28

## 2024-05-17 RX ORDER — TRIAMCINOLONE ACETONIDE 40 MG/ML
40 INJECTION, SUSPENSION INTRA-ARTICULAR; INTRAMUSCULAR
Status: COMPLETED | OUTPATIENT
Start: 2024-05-17 | End: 2024-05-17

## 2024-05-17 RX ADMIN — BUPIVACAINE HYDROCHLORIDE 1 ML: 2.5 INJECTION, SOLUTION INFILTRATION; PERINEURAL at 15:00

## 2024-05-17 RX ADMIN — TRIAMCINOLONE ACETONIDE 40 MG: 40 INJECTION, SUSPENSION INTRA-ARTICULAR; INTRAMUSCULAR at 15:00

## 2024-05-17 NOTE — PROGRESS NOTES
ASSESSMENT/PLAN:    Diagnoses and all orders for this visit:    Tenosynovitis, de Quervain  -     Hand/upper extremity injection: R extensor compartment 1  -     Cock Up Wrist Splint    Tendinitis of right wrist  -     Hand/upper extremity injection: R extensor compartment 1  -     Cock Up Wrist Splint    Other orders  -     PARoxetine (PAXIL) 30 mg tablet; Take 30 mg by mouth every morning  -     diclofenac sodium (VOLTAREN) 50 mg EC tablet; 50 mg  -     Aspirin 81 MG CAPS; Take by mouth        Discussed with patient that her exam is consistent with tenosynovitis of her right wrist. The patient declined a prescription for physical therapy. She was offered and accepted an cortisone injection(s) to her Right wrist(s) for symptomatic relief of pain and inflammation. Patient tolerated the treatment(s) well. Ice and post injection protocol advised. Weightbearing activities as tolerated. She will be seen for follow-up in 2 weeks for re-evaluation. Patient expresses understanding and is in agreement with this treatment plan. The patient was given the opportunity to ask questions or present concerns.      Return in about 2 weeks (around 5/31/2024) for Recheck.      _____________________________________________________  CHIEF COMPLAINT:  Chief Complaint   Patient presents with    Right Wrist - Pain         SUBJECTIVE:  Suad Ch is a 64 y.o. year old female who presents for initial evaluation of her right wrist. The patient states that approximately 3 months ago, she lifted a friend up from the ground. She did not notice any pain while lifting her friend, she reports that the pain developed approximately 3 days later. She states that the pain continued over the past 3 months, she states that the pain recently worsened. She states that her pain is well controlled at rest, she states that her pain is exacerbated with motion of the wrist and hand. She states that she experienced stiffness overnight, she reports sharp pains  once she wakes up in the morning. She denies numbness or tingling. She has a history of a right carpal tunnel release from 2021.    PAST MEDICAL HISTORY:  Past Medical History:   Diagnosis Date    Anxiety     Colon cancer screening 21    Colon polyp     COVID-19 2020    Pt was admitted with breathing difficulties to [facility]; Pt had a fever, loss of taste and smell.    Depression     Diverticulitis     High cholesterol     Hypertension     Insomnia     Pre-diabetes     Spinal stenosis        PAST SURGICAL HISTORY:  Past Surgical History:   Procedure Laterality Date    BREAST SURGERY Bilateral     Breast augmentation     SECTION      COLONOSCOPY      ENDOMETRIAL ABLATION      NC NEUROPLASTY &/TRANSPOS MEDIAN NRV CARPAL TUNNE Right 2021    Procedure: RELEASE CARPAL TUNNEL;  Surgeon: Sami Kaufman;  Location: [facility];  Service: Orthopedics    TUBAL LIGATION         FAMILY HISTORY:  Family History   Problem Relation Age of Onset    Dementia Mother     Heart disease Father        SOCIAL HISTORY:  Social History     Tobacco Use    Smoking status: Former     Current packs/day: 0.00     Types: Cigarettes     Quit date: 2008     Years since quitting: 15.6    Smokeless tobacco: Never   Vaping Use    Vaping status: Never Used   Substance Use Topics    Alcohol use: Yes     Comment: occasional    Drug use: Never       MEDICATIONS:    Current Outpatient Medications:     Aspirin 81 MG CAPS, Take by mouth, Disp: , Rfl:     diclofenac sodium (VOLTAREN) 50 mg EC tablet, 50 mg, Disp: , Rfl:     ketoconazole (NIZORAL) 2 % shampoo, , Disp: , Rfl:     lisinopril-hydrochlorothiazide (PRINZIDE,ZESTORETIC) 20-25 MG per tablet, Take 1 tablet by mouth daily, Disp: , Rfl:     MELATONIN PO, Take by mouth, Disp: , Rfl:     METFORMIN HCL ER PO, Take 500 mg by mouth daily, Disp: , Rfl:     Multiple Minerals-Vitamins (Luis-Mag-Zinc-D) TABS, Take by mouth, Disp: , Rfl:     N-ACETYL CYSTEINE PO, Take 1,200 mg by mouth  in the morning, Disp: , Rfl:     PARoxetine (PAXIL) 30 mg tablet, Take 30 mg by mouth every morning, Disp: , Rfl:     pravastatin (PRAVACHOL) 20 mg tablet, Take 20 mg by mouth daily, Disp: , Rfl:     vitamin B-12 (VITAMIN B-12) 1,000 mcg tablet, Take by mouth daily, Disp: , Rfl:     ALLERGIES:  No Known Allergies    Review of systems:   Constitutional: Negative for fatigue, fever or loss of apetite.   HENT: Negative.    Respiratory: Negative for shortness of breath, dyspnea.    Cardiovascular: Negative for chest pain/tightness.   Gastrointestinal: Negative for abdominal pain, N/V.   Endocrine: Negative for cold/heat intolerance, unexplained weight loss/gain.   Genitourinary: Negative for flank pain, dysuria, hematuria.   Musculoskeletal: As noted in HPI   Skin: Negative for rash.    Neurological: Negative   Psychiatric/Behavioral: Negative for agitation.  _____________________________________________________  PHYSICAL EXAMINATION:    Blood pressure 150/94, pulse 73, temperature 98.1 °F (36.7 °C), weight 68.9 kg (151 lb 12.8 oz), SpO2 96%.    General: well developed and well nourished, alert, oriented times 3, and appears comfortable  Psychiatric: Normal  HEENT: Benign  Cardiovascular: Regular    Pulmonary: No wheezing or stridor  Abdomen: Soft, Nontender  Skin: No masses, erythema, lacerations, fluctation, ulcerations  Neurovascular: sensory and motor intact    MUSCULOSKELETAL EXAMINATION:    right wrist -  Patient presents with no obvious anatomical deformity  Skin is warm and dry to touch with no signs of erythema, ecchymosis, infection  ROM WNL, pain with wrist extension   TTP over first dorsal compartment  Strength: 5/5 throughout  No soft tissue swelling or effusion noted  Full FDS, FDP, extensor mechanisms are intact  - Thenar atrophy, - intrinsic atrophy  - Tinel's at carpal tunnel  + Finkelstein   Demonstrates normal wrist, elbow, and shoulder motion  Forearm compartments are soft and supple  2+ Distal  radial pulse with brisk capillary refill to the fingers  Radial, median, ulnar motor and sensory distribution intact  Sensation to light touch intact distally        _____________________________________________________  STUDIES REVIEWED:  No Studies to review    PROCEDURES:  Hand/upper extremity injection: R extensor compartment 1  Universal Protocol:  Consent: Verbal consent obtained.  Risks and benefits: risks, benefits and alternatives were discussed  Consent given by: patient  Timeout called at: 5/17/2024 3:26 PM.  Patient understanding: patient states understanding of the procedure being performed  Site marked: the operative site was marked  Patient identity confirmed: verbally with patient  Supporting Documentation  Indications: joint swelling, pain and tendon swelling   Procedure Details  Condition:de Quervain's tenosynovitis Site: R extensor compartment 1   Needle size: 25 G  Ultrasound guidance: no  Approach: radial  Medications administered: 1 mL bupivacaine 0.25 %; 40 mg triamcinolone acetonide 40 mg/mL  Patient tolerance: patient tolerated the procedure well with no immediate complications  Dressing:  Sterile dressing applied           Scribe Attestation      I,:  Eri Ponce am acting as a scribe while in the presence of the attending physician.:       I,:  Sami Kaufman personally performed the services described in this documentation    as scribed in my presence.:

## 2024-05-23 ENCOUNTER — DOCUMENTATION (OUTPATIENT)
Dept: OBGYN CLINIC | Facility: CLINIC | Age: 64
End: 2024-05-23

## 2024-06-15 DIAGNOSIS — Z00.6 ENCOUNTER FOR EXAMINATION FOR NORMAL COMPARISON OR CONTROL IN CLINICAL RESEARCH PROGRAM: ICD-10-CM

## 2024-06-17 ENCOUNTER — APPOINTMENT (OUTPATIENT)
Dept: LAB | Facility: CLINIC | Age: 64
End: 2024-06-17

## 2024-06-17 DIAGNOSIS — Z00.6 ENCOUNTER FOR EXAMINATION FOR NORMAL COMPARISON OR CONTROL IN CLINICAL RESEARCH PROGRAM: ICD-10-CM

## 2024-06-17 PROCEDURE — 36415 COLL VENOUS BLD VENIPUNCTURE: CPT

## 2024-07-09 LAB
APOB+LDLR+PCSK9 GENE MUT ANL BLD/T: NOT DETECTED
BRCA1+BRCA2 DEL+DUP + FULL MUT ANL BLD/T: NOT DETECTED
MLH1+MSH2+MSH6+PMS2 GN DEL+DUP+FUL M: NOT DETECTED

## (undated) DEVICE — CHLORAPREP HI-LITE 26ML ORANGE

## (undated) DEVICE — SYRINGE 10ML LL

## (undated) DEVICE — GLOVE INDICATOR PI UNDERGLOVE SZ 7.5 BLUE

## (undated) DEVICE — ALCOHOL PREP, 2 PLY, LARGE, MADE IN USA, SATURATED WITH 70% ISOPROPYL ALCOHOL, FOR EXTERNAL USE ONLY: Brand: WEBCOL

## (undated) DEVICE — PADDING CAST 4 IN  COTTON STRL

## (undated) DEVICE — GLOVE SRG BIOGEL 7.5

## (undated) DEVICE — PADDING CAST 3IN COTTON STRL

## (undated) DEVICE — STRETCH BANDAGE: Brand: CURITY

## (undated) DEVICE — NEEDLE 25G X 1 1/2

## (undated) DEVICE — ALL PURPOSE SPONGES,NON-WOVEN, 4 PLY: Brand: CURITY

## (undated) DEVICE — STOCKINETTE 4 IN COTTON NS  1 PLY 25 YD

## (undated) DEVICE — CAST PADDING 4 IN SYNTHETIC NON-STRL

## (undated) DEVICE — NEEDLE 18 G X 1 1/2

## (undated) DEVICE — DRAPE STERI 1010 18IN X 12IN

## (undated) DEVICE — BIPOLAR CORD DISP

## (undated) DEVICE — SPLINT COMFORT 3 X 12

## (undated) DEVICE — SINGLE PORT MANIFOLD: Brand: NEPTUNE 2

## (undated) DEVICE — STERILE BETHLEHEM PLASTIC HAND: Brand: CARDINAL HEALTH

## (undated) DEVICE — CURITY NON-ADHERENT STRIPS: Brand: CURITY